# Patient Record
Sex: MALE | Race: WHITE | NOT HISPANIC OR LATINO | Employment: FULL TIME | URBAN - METROPOLITAN AREA
[De-identification: names, ages, dates, MRNs, and addresses within clinical notes are randomized per-mention and may not be internally consistent; named-entity substitution may affect disease eponyms.]

---

## 2017-04-05 ENCOUNTER — COMMUNICATION - HEALTHEAST (OUTPATIENT)
Dept: CARDIOLOGY | Facility: CLINIC | Age: 47
End: 2017-04-05

## 2017-04-05 DIAGNOSIS — E78.5 HYPERLIPIDEMIA: ICD-10-CM

## 2017-04-26 ENCOUNTER — OFFICE VISIT - HEALTHEAST (OUTPATIENT)
Dept: CARDIOLOGY | Facility: CLINIC | Age: 47
End: 2017-04-26

## 2017-04-26 DIAGNOSIS — E78.5 HYPERLIPIDEMIA LDL GOAL <130: ICD-10-CM

## 2017-04-26 ASSESSMENT — MIFFLIN-ST. JEOR: SCORE: 1660.93

## 2017-05-16 ENCOUNTER — COMMUNICATION - HEALTHEAST (OUTPATIENT)
Dept: CARDIOLOGY | Facility: CLINIC | Age: 47
End: 2017-05-16

## 2017-05-16 DIAGNOSIS — E78.5 HYPERLIPIDEMIA: ICD-10-CM

## 2018-04-25 ENCOUNTER — COMMUNICATION - HEALTHEAST (OUTPATIENT)
Dept: CARDIOLOGY | Facility: CLINIC | Age: 48
End: 2018-04-25

## 2018-04-25 DIAGNOSIS — E78.5 HYPERLIPIDEMIA: ICD-10-CM

## 2018-05-31 ENCOUNTER — COMMUNICATION - HEALTHEAST (OUTPATIENT)
Dept: CARDIOLOGY | Facility: CLINIC | Age: 48
End: 2018-05-31

## 2018-05-31 DIAGNOSIS — E78.5 HYPERLIPIDEMIA: ICD-10-CM

## 2018-06-03 ENCOUNTER — COMMUNICATION - HEALTHEAST (OUTPATIENT)
Dept: CARDIOLOGY | Facility: CLINIC | Age: 48
End: 2018-06-03

## 2018-06-03 DIAGNOSIS — E78.5 HYPERLIPIDEMIA: ICD-10-CM

## 2018-07-17 ENCOUNTER — COMMUNICATION - HEALTHEAST (OUTPATIENT)
Dept: CARDIOLOGY | Facility: CLINIC | Age: 48
End: 2018-07-17

## 2018-07-17 ENCOUNTER — OFFICE VISIT - HEALTHEAST (OUTPATIENT)
Dept: CARDIOLOGY | Facility: CLINIC | Age: 48
End: 2018-07-17

## 2018-07-17 ENCOUNTER — COMMUNICATION - HEALTHEAST (OUTPATIENT)
Dept: TELEHEALTH | Facility: CLINIC | Age: 48
End: 2018-07-17

## 2018-07-17 DIAGNOSIS — E78.00 HYPERCHOLESTEROLEMIA: ICD-10-CM

## 2018-07-17 DIAGNOSIS — Z82.49 FAMILY HISTORY OF PREMATURE CAD: ICD-10-CM

## 2018-07-17 LAB
CHOLEST SERPL-MCNC: 165 MG/DL
FASTING STATUS PATIENT QL REPORTED: YES
HDLC SERPL-MCNC: 44 MG/DL
LDLC SERPL CALC-MCNC: 109 MG/DL
TRIGL SERPL-MCNC: 59 MG/DL

## 2018-07-17 ASSESSMENT — MIFFLIN-ST. JEOR: SCORE: 1583.82

## 2018-08-08 ENCOUNTER — COMMUNICATION - HEALTHEAST (OUTPATIENT)
Dept: CARDIOLOGY | Facility: CLINIC | Age: 48
End: 2018-08-08

## 2018-08-08 DIAGNOSIS — E78.5 HYPERLIPIDEMIA: ICD-10-CM

## 2019-07-28 ENCOUNTER — COMMUNICATION - HEALTHEAST (OUTPATIENT)
Dept: CARDIOLOGY | Facility: CLINIC | Age: 49
End: 2019-07-28

## 2019-07-28 DIAGNOSIS — E78.5 HYPERLIPIDEMIA: ICD-10-CM

## 2019-07-30 ENCOUNTER — COMMUNICATION - HEALTHEAST (OUTPATIENT)
Dept: CARDIOLOGY | Facility: CLINIC | Age: 49
End: 2019-07-30

## 2019-07-30 DIAGNOSIS — E78.00 HYPERCHOLESTEROLEMIA: ICD-10-CM

## 2019-07-30 DIAGNOSIS — Z82.49 FAMILY HISTORY OF PREMATURE CAD: ICD-10-CM

## 2019-08-21 ENCOUNTER — AMBULATORY - HEALTHEAST (OUTPATIENT)
Dept: CARDIOLOGY | Facility: CLINIC | Age: 49
End: 2019-08-21

## 2019-08-21 DIAGNOSIS — Z82.49 FAMILY HISTORY OF PREMATURE CAD: ICD-10-CM

## 2019-08-21 DIAGNOSIS — E78.00 HYPERCHOLESTEROLEMIA: ICD-10-CM

## 2019-08-21 LAB
CHOLEST SERPL-MCNC: 197 MG/DL
FASTING STATUS PATIENT QL REPORTED: YES
HDLC SERPL-MCNC: 52 MG/DL
LDLC SERPL CALC-MCNC: 125 MG/DL
TRIGL SERPL-MCNC: 100 MG/DL

## 2019-08-28 ENCOUNTER — OFFICE VISIT - HEALTHEAST (OUTPATIENT)
Dept: CARDIOLOGY | Facility: CLINIC | Age: 49
End: 2019-08-28

## 2019-08-28 DIAGNOSIS — E78.00 HYPERCHOLESTEROLEMIA: ICD-10-CM

## 2019-08-28 DIAGNOSIS — Z82.49 FAMILY HISTORY OF PREMATURE CORONARY ARTERY DISEASE: ICD-10-CM

## 2019-08-28 ASSESSMENT — MIFFLIN-ST. JEOR: SCORE: 1615.57

## 2019-11-15 ENCOUNTER — COMMUNICATION - HEALTHEAST (OUTPATIENT)
Dept: CARDIOLOGY | Facility: CLINIC | Age: 49
End: 2019-11-15

## 2019-11-15 DIAGNOSIS — E78.5 HYPERLIPIDEMIA: ICD-10-CM

## 2020-06-09 ENCOUNTER — COMMUNICATION - HEALTHEAST (OUTPATIENT)
Dept: CARDIOLOGY | Facility: CLINIC | Age: 50
End: 2020-06-09

## 2020-06-09 DIAGNOSIS — E78.5 HYPERLIPIDEMIA: ICD-10-CM

## 2020-09-17 ENCOUNTER — OFFICE VISIT - HEALTHEAST (OUTPATIENT)
Dept: CARDIOLOGY | Facility: CLINIC | Age: 50
End: 2020-09-17

## 2020-09-17 DIAGNOSIS — Z82.49 FAMILY HISTORY OF PREMATURE CORONARY ARTERY DISEASE: ICD-10-CM

## 2020-09-17 DIAGNOSIS — E78.00 HYPERCHOLESTEROLEMIA: ICD-10-CM

## 2020-09-20 ENCOUNTER — COMMUNICATION - HEALTHEAST (OUTPATIENT)
Dept: CARDIOLOGY | Facility: CLINIC | Age: 50
End: 2020-09-20

## 2020-09-20 DIAGNOSIS — E78.5 HYPERLIPIDEMIA: ICD-10-CM

## 2020-12-21 ENCOUNTER — COMMUNICATION - HEALTHEAST (OUTPATIENT)
Dept: CARDIOLOGY | Facility: CLINIC | Age: 50
End: 2020-12-21

## 2020-12-21 DIAGNOSIS — E78.5 HYPERLIPIDEMIA: ICD-10-CM

## 2021-03-17 ENCOUNTER — COMMUNICATION - HEALTHEAST (OUTPATIENT)
Dept: CARDIOLOGY | Facility: CLINIC | Age: 51
End: 2021-03-17

## 2021-03-17 DIAGNOSIS — E78.5 HYPERLIPIDEMIA: ICD-10-CM

## 2021-04-23 ENCOUNTER — OFFICE VISIT - HEALTHEAST (OUTPATIENT)
Dept: CARDIOLOGY | Facility: CLINIC | Age: 51
End: 2021-04-23

## 2021-04-23 DIAGNOSIS — E78.5 HYPERLIPIDEMIA: ICD-10-CM

## 2021-04-23 DIAGNOSIS — E78.00 HYPERCHOLESTEROLEMIA: ICD-10-CM

## 2021-04-23 DIAGNOSIS — Z82.49 FAMILY HISTORY OF PREMATURE CORONARY ARTERY DISEASE: ICD-10-CM

## 2021-05-30 VITALS — WEIGHT: 189 LBS | BODY MASS INDEX: 29.66 KG/M2 | HEIGHT: 67 IN

## 2021-05-30 NOTE — TELEPHONE ENCOUNTER
----- Message from Arelis Schrader sent at 7/30/2019 12:55 PM CDT -----  Regarding: BETZY Witt asks if Dr. Davenport wants him to be fasting for his appt on 8/28/19 or any lab orders prior to the office visit?     Please reply to pool and we'll call him back either way.      Thank you!  ~Arelis [phone]

## 2021-05-30 NOTE — TELEPHONE ENCOUNTER
Please address patient's question regarding whether any labs should be done prior to 8-28-19 f/u appt otherwise patient will be instructed to remain NPO for 0750 appt.  mg

## 2021-05-31 NOTE — TELEPHONE ENCOUNTER
Order placed per protocol - message sent to sched with instructions to lab appt 1wk prior to f/u.  mg

## 2021-05-31 NOTE — TELEPHONE ENCOUNTER
Message rec'd 8-2-19 @ 0715:        We could order a lipid profile to be done prior to his visit.     Sultana Yadav MD

## 2021-05-31 NOTE — PATIENT INSTRUCTIONS - HE
1. Continue current medications for now  2. Set up CT calcium score to see if cholesterol plaque build up that would drive us to push for lower lipid levels

## 2021-06-01 VITALS — BODY MASS INDEX: 27 KG/M2 | HEIGHT: 67 IN | WEIGHT: 172 LBS

## 2021-06-03 VITALS — HEIGHT: 67 IN | BODY MASS INDEX: 28.09 KG/M2 | WEIGHT: 179 LBS

## 2021-06-11 NOTE — PROGRESS NOTES
Review of systems done with patient over the phone. Positive for weight loss after exercising. All other review of systems within normal limits. He was unable to get his blood pressure today.

## 2021-06-25 NOTE — PROGRESS NOTES
Progress Notes by Sultana Davenport MD at 4/26/2017  3:30 PM     Author: Sultana Davenport MD Service: -- Author Type: Physician    Filed: 4/26/2017  4:17 PM Encounter Date: 4/26/2017 Status: Signed    : Sultana Davenport MD (Physician)           Click to link to [facility] HEART CARE NOTE    Thank you for asking the [facility] Heart Care team to see Mr. Hung Newell to follow-up on hyperlipidemia and palpitations.    Assessment/Recommendations   Assessment:    1.  Hyperlipidemia, currently well controlled on atorvastatin with LDL well below 130.  At this point, in the absence of documented coronary artery disease, I would favor continuation of his current dose.  I did recommend a follow-up lipid profile in December along with liver function studies.  2.  Palpitations, infrequent.  He did have some palpitations in December when he was trying to finish remodeling drop in his garage with the upcoming holidays but otherwise has had none since.  No decline in his exercise capacity.  3.  Family history of early coronary artery disease.  Patient denies any symptoms of exertional chest discomfort or decline in exercise capacity.    Plan:  1.  Continue current dose of atorvastatin  2.  Plan lipid profile and liver function studies in December       History of Present Illness    Mr. Hung Newell is a 47 y.o. male with history of mild hyperlipidemia and strong family history of early heart disease who presents today for follow-up yearly visit.  I initially saw him in August 2014 after he had been seen in the ER for symptoms of palpitations.  He did undergo a stress echo at that time which was negative for ischemic cause.  Because of his strong family history of early heart disease, I did recommend initiation of statin therapy in an effort to drive his LDL below 130.  He has been on atorvastatin and has tolerated it well with his most recent lipid profile in December 2016 showing an LDL of 113.   Currently he reports only infrequent episodes of palpitations with the last episode occurring in early December.  He admitted he was under stress as he was trying to finish a remodeling project in his garage just prior to the holidays.  Outside of those few palpitations, he has remained asymptomatic.  He continues to exercise and denies any decline in his exercise capacity.    ECG (personally reviewed): No ECG today    ECHO (personally reviewed): No recent echo      Physical Examination Review of Systems   Vitals:    04/26/17 1522   BP: 120/84   Pulse: 68   Resp: 14     Body mass index is 29.6 kg/(m^2).  Wt Readings from Last 3 Encounters:   04/26/17 189 lb (85.7 kg)   12/07/15 180 lb (81.6 kg)     General Appearance:   Awake, Alert, No acute distress.   HEENT:  No scleral icterus; the mucous membranes were pink and moist.   Neck: No cervical bruits or jugular venous distention    Chest: The spine was straight. The chest was symmetric.   Lungs:   Respirations unlabored; the lungs are clear to auscultation. No wheezing   Cardiovascular:    regular rate and rhythm.  S1, S2 normal.  No murmur, gallop or rub heard.     Abdomen:  No organomegaly, masses, bruits, or tenderness. Bowels sounds are present   Extremities:  no peripheral edema bilaterally.     Skin: No xanthelasma. Warm, Dry.   Musculoskeletal: No tenderness.   Neurologic: Mood and affect are appropriate.    General: WNL  Eyes: WNL  Ears/Nose/Throat: WNL  Lungs: WNL  Heart: WNL  Stomach: WNL  Bladder: WNL  Muscle/Joints: WNL  Skin: WNL  Nervous System: WNL  Mental Health: WNL     Blood: WNL     Medical History  Surgical History Family History Social History   Past Medical History:   Diagnosis Date   ? Environmental allergies    -hyperlipidemia  Past Surgical History:   Procedure Laterality Date   ? PLEURAL SCARIFICATION      Family History   Problem Relation Age of Onset   ? Coronary artery disease Sister    ? Coronary artery disease Brother    ? Coronary  artery disease Sister    ? Coronary artery disease Brother    ? Heart failure Mother    ? Heart failure Father     Social History     Social History   ? Marital status:      Spouse name: N/A   ? Number of children: N/A   ? Years of education: N/A     Occupational History   ? Not on file.     Social History Main Topics   ? Smoking status: Never Smoker   ? Smokeless tobacco: Not on file   ? Alcohol use Yes      Comment: Occasional   ? Drug use: Not on file   ? Sexual activity: Not on file     Other Topics Concern   ? Not on file     Social History Narrative          Medications  Allergies   Current Outpatient Prescriptions   Medication Sig Dispense Refill   ? aspirin 81 MG EC tablet Take 81 mg by mouth daily.     ? atorvastatin (LIPITOR) 40 MG tablet Take 1 tablet (40 mg total) by mouth at bedtime. 30 tablet 0   ? loratadine (CLARITIN) 10 mg tablet Take 10 mg by mouth daily.       No current facility-administered medications for this visit.       No Known Allergies      Lab Results    Chemistry/lipid CBC Cardiac Enzymes/BNP/TSH/INR   Lab Results   Component Value Date    CHOL 186 12/14/2016    HDL 52 12/14/2016    LDLCALC 113 12/14/2016    TRIG 104 12/14/2016    CREATININE 0.91 08/06/2014    BUN 11 08/06/2014    K 4.0 08/06/2014     08/06/2014     08/06/2014    CO2 24 08/06/2014    Lab Results   Component Value Date    WBC 6.7 08/06/2014    HGB 16.0 08/06/2014    HCT 47.0 08/06/2014    MCV 90 08/06/2014     08/06/2014    Lab Results   Component Value Date    TROPONINI <0.01 08/06/2014    TSH 2.72 08/06/2014

## 2021-06-26 NOTE — PROGRESS NOTES
Progress Notes by Sultana Davenport MD at 7/17/2018  8:10 AM     Author: Sultana Davenport MD Service: -- Author Type: Physician    Filed: 7/17/2018  8:38 AM Encounter Date: 7/17/2018 Status: Signed    : Sultana Davenport MD (Physician)           Click to link to [facility] Care     [facility] HEART CARE NOTE    Thank you, Dr. Black ref. provider found, for asking the [facility] Heart Care team to see Mr. Hung Newell to follow up on hypercholesterolemia and family history of premature coronary artery disease.    Assessment/Recommendations   Assessment:    1.  Family history of premature coronary artery disease.  Patient continues to deny any anginal symptoms despite excellent exercise capacity.  At this point, continue to work on risk factor modification.  2.  Hypercholesterolemia, on atorvastatin 40 mg daily.  Our goal was to try to maintain his LDL below 130.  He has not had a lipid profile for 18 months.  As he is fasting today, will draw lipid profile for reassessment.    Plan:  1.  Draw lipids today.  If LDL remains below 130, continue atorvastatin at current dose  2.  Follow-up in 1 year       History of Present Illness    Mr. Hung Newell is a 48 y.o. male with history of hypercholesterolemia and family history of premature coronary artery disease presents today for a follow-up visit.  Over the course of the past year, he and his wife have drastically changed her diet.  They have been consuming a fair amount of smoothies and healthy type foods, as well as doing workouts.  In the process, he has lost 17 pounds and states he feels great.  He denies any chest discomfort or exertional dyspnea.  No decline in exercise capacity.    ECG (personally reviewed): No ECG today     Cardiac Imaging Studies (personally reviewed): no imaging studies     Physical Examination Review of Systems   Vitals:    07/17/18 0811   BP: 120/84   Pulse: 76   Resp: 16     Body mass index is 26.94 kg/(m^2).  Wt Readings from Last 3  Encounters:   07/17/18 172 lb (78 kg)   04/26/17 189 lb (85.7 kg)   12/07/15 180 lb (81.6 kg)     General Appearance:   Awake, Alert, No acute distress.   HEENT:  No scleral icterus; the mucous membranes were pink and moist.   Neck: No cervical bruits or jugular venous distention    Chest: The spine was straight. The chest was symmetric.   Lungs:   Respirations unlabored; the lungs are clear to auscultation. No wheezing   Cardiovascular:    Regular rate and rhythm.  S1, S2 normal.  No murmur, gallop or rub   Abdomen:  No organomegaly, masses, bruits, or tenderness. Bowels sounds are present   Extremities:  No peripheral edema bilaterally   Skin: No xanthelasma. Warm, Dry.   Musculoskeletal: No tenderness.   Neurologic: Mood and affect are appropriate.    General: WNL  Eyes: WNL  Ears/Nose/Throat: WNL  Lungs: WNL  Heart: WNL  Stomach: WNL  Bladder: WNL  Muscle/Joints: WNL  Skin: WNL  Nervous System: WNL  Mental Health: WNL     Blood: WNL     Medical History  Surgical History Family History Social History   Past Medical History:   Diagnosis Date   ? Environmental allergies    ? Hyperlipidemia     Past Surgical History:   Procedure Laterality Date   ? PLEURAL SCARIFICATION      Family History   Problem Relation Age of Onset   ? Coronary artery disease Sister    ? Coronary artery disease Brother    ? Coronary artery disease Sister    ? Coronary artery disease Brother    ? Heart failure Mother    ? Heart failure Father     Social History     Social History   ? Marital status:      Spouse name: N/A   ? Number of children: N/A   ? Years of education: N/A     Occupational History   ? Not on file.     Social History Main Topics   ? Smoking status: Never Smoker   ? Smokeless tobacco: Never Used   ? Alcohol use Yes      Comment: Occasional   ? Drug use: No   ? Sexual activity: Not on file     Other Topics Concern   ? Not on file     Social History Narrative          Medications  Allergies   Current Outpatient  Prescriptions   Medication Sig Dispense Refill   ? atorvastatin (LIPITOR) 40 MG tablet TAKE 1 TABLET (40 MG TOTAL) BY MOUTH AT BEDTIME 30 tablet 1   ? sulfamethoxazole-trimethoprim (SEPTRA DS) 800-160 mg per tablet Take 1 tablet by mouth 2 (two) times a day.     ? aspirin 81 MG EC tablet Take 81 mg by mouth daily.     ? loratadine (CLARITIN) 10 mg tablet Take 10 mg by mouth daily.       No current facility-administered medications for this visit.       No Known Allergies      Lab Results    Chemistry/lipid CBC Cardiac Enzymes/BNP/TSH/INR   Lab Results   Component Value Date    CHOL 186 12/14/2016    HDL 52 12/14/2016    LDLCALC 113 12/14/2016    TRIG 104 12/14/2016    CREATININE 0.91 08/06/2014    BUN 11 08/06/2014    K 4.0 08/06/2014     08/06/2014     08/06/2014    CO2 24 08/06/2014    Lab Results   Component Value Date    WBC 6.7 08/06/2014    HGB 16.0 08/06/2014    HCT 47.0 08/06/2014    MCV 90 08/06/2014     08/06/2014    Lab Results   Component Value Date    TROPONINI <0.01 08/06/2014    TSH 2.72 08/06/2014

## 2021-06-27 NOTE — PROGRESS NOTES
Progress Notes by Sultana Davenport MD at 8/28/2019  7:50 AM     Author: Sultana Davenport MD Service: -- Author Type: Physician    Filed: 8/28/2019  8:29 AM Encounter Date: 8/28/2019 Status: Signed    : Sultana Davenport MD (Physician)           Click to link to [facility] Heart Care     [facility] HEART CARE NOTE    Thank you,  No ref. provider found, for asking the [facility] Heart Care team to see Mr. Hung Newell to follow-up on hypercholesterolemia and family history of coronary artery disease.    Assessment/Recommendations   Assessment:    1.  Hypercholesterolemia, currently borderline controlled.  His total cholesterol and LDL of risen over the past year, although LDL remains below 130.  He admits to less rigid diet as a result of multiple factors as well as decreased exercise due to knee issues.  At this point, recommended CT coronary calcium score as it may be reasonable to drive his LDL lower.  2.  Family history of premature coronary artery disease.  He states he lost his brother suddenly at the age of 58 possibly due to an acute MI although no autopsy was done.  This is new in the past year since his last visit.  Again recommended CT coronary calcium score to get a sense of whether there is cholesterol plaque present.  Calcium is present, I would favor driving LDL below 100 and ideally below 70.      Plan:  1.  Continue current medications for now  2.  Schedule CT coronary calcium score with further recommendations to follow       History of Present Illness    Mr. Hung Newell is a 49 y.o. male with history of hypercholesterolemia and family history of premature coronary artery disease returns today for a follow-up visit.  He tells me he has not been as stringent with diet and exercise as he was at the time of his visit last year.  His son got  recently and as a result, his diet has not been as rigid as it was.  He also has had difficulty doing as much exercise due to knee issues.  He  currently reports no symptoms of exertional chest discomfort or dyspnea.  He does relate that 1 of his brothers  suddenly at the age of 58 possibly due to an acute MI but no autopsy was done.  His brother did have coronary artery disease.    ECG (personally reviewed): No ECG today    Cardiac Imaging Studies (personally reviewed): No new imaging     Physical Examination Review of Systems   Vitals:    19 0751   BP: 120/78   Pulse: 80   Resp: 16     Body mass index is 28.04 kg/m .  Wt Readings from Last 3 Encounters:   19 179 lb (81.2 kg)   18 172 lb (78 kg)   17 189 lb (85.7 kg)     General Appearance:   Awake, Alert, No acute distress.   HEENT:  No scleral icterus; the mucous membranes were pink and moist.   Neck: No cervical bruits or jugular venous distention    Chest: The spine was straight. The chest was symmetric.   Lungs:   Respirations unlabored; the lungs are clear to auscultation. No wheezing   Cardiovascular:    Regular rate and rhythm.  S1, S2 normal.  No murmur or gallop   Abdomen:  No organomegaly, masses, bruits, or tenderness. Bowels sounds are present   Extremities:  No peripheral edema bilaterally.   Skin: No xanthelasma. Warm, Dry.   Musculoskeletal: No tenderness.   Neurologic: Mood and affect are appropriate.    General: WNL  Eyes: WNL  Ears/Nose/Throat: WNL  Lungs: WNL  Heart: WNL  Stomach: WNL  Bladder: WNL  Muscle/Joints: WNL  Skin: WNL  Nervous System: WNL  Mental Health: WNL     Blood: WNL     Medical History  Surgical History Family History Social History   Past Medical History:   Diagnosis Date   ? Environmental allergies    ? Hyperlipidemia     Past Surgical History:   Procedure Laterality Date   ? PLEURAL SCARIFICATION      Family History   Problem Relation Age of Onset   ? Coronary artery disease Sister    ? Coronary artery disease Brother    ? Acute Myocardial Infarction Brother 58   ? Coronary artery disease Sister    ? Coronary artery disease Brother    ?  Heart failure Mother    ? Heart failure Father     Social History     Socioeconomic History   ? Marital status:      Spouse name: Not on file   ? Number of children: Not on file   ? Years of education: Not on file   ? Highest education level: Not on file   Occupational History   ? Not on file   Social Needs   ? Financial resource strain: Not on file   ? Food insecurity:     Worry: Not on file     Inability: Not on file   ? Transportation needs:     Medical: Not on file     Non-medical: Not on file   Tobacco Use   ? Smoking status: Never Smoker   ? Smokeless tobacco: Never Used   Substance and Sexual Activity   ? Alcohol use: Yes     Comment: Occasional   ? Drug use: No   ? Sexual activity: Not on file   Lifestyle   ? Physical activity:     Days per week: Not on file     Minutes per session: Not on file   ? Stress: Not on file   Relationships   ? Social connections:     Talks on phone: Not on file     Gets together: Not on file     Attends [employer] service: Not on file     Active member of club or organization: Not on file     Attends meetings of clubs or organizations: Not on file     Relationship status: Not on file   ? Intimate partner violence:     Fear of current or ex partner: Not on file     Emotionally abused: Not on file     Physically abused: Not on file     Forced sexual activity: Not on file   Other Topics Concern   ? Not on file   Social History Narrative   ? Not on file          Medications  Allergies   Current Outpatient Medications   Medication Sig Dispense Refill   ? atorvastatin (LIPITOR) 40 MG tablet TAKE 1 TABLET (40 MG TOTAL) BY MOUTH AT BEDTIME 90 tablet 0   ? aspirin 81 MG EC tablet Take 81 mg by mouth daily.     ? loratadine (CLARITIN) 10 mg tablet Take 10 mg by mouth daily.       No current facility-administered medications for this visit.       No Known Allergies      Lab Results    Chemistry/lipid CBC Cardiac Enzymes/BNP/TSH/INR   Lab Results   Component Value Date    CHOL 197  08/21/2019    HDL 52 08/21/2019    LDLCALC 125 08/21/2019    TRIG 100 08/21/2019    CREATININE 0.91 08/06/2014    BUN 11 08/06/2014    K 4.0 08/06/2014     08/06/2014     08/06/2014    CO2 24 08/06/2014    Lab Results   Component Value Date    WBC 6.7 08/06/2014    HGB 16.0 08/06/2014    HCT 47.0 08/06/2014    MCV 90 08/06/2014     08/06/2014    Lab Results   Component Value Date    TROPONINI <0.01 08/06/2014    TSH 2.72 08/06/2014

## 2021-06-29 NOTE — PROGRESS NOTES
"Progress Notes by Sultana Davenport MD at 9/17/2020  9:30 AM     Author: Sultana Davenport MD Service: -- Author Type: Physician    Filed: 9/17/2020 11:49 AM Encounter Date: 9/17/2020 Status: Signed    : Sultana Davenport MD (Physician)           The patient has been notified of following:     \"This video visit will be conducted via a call between you and your physician/provider. We have found that certain health care needs can be provided without the need for an in-person physical exam.  This service lets us provide the care you need with a video conversation.  If a prescription is necessary we can send it directly to your pharmacy.  If lab work is needed we can place an order for that and you can then stop by our lab to have the test done at a later time.      Patient has given verbal consent to a Video visit? Yes    HEART CARE VIDEO ENCOUNTER        The patient has chosen to have the visit conducted as a video visit, to reduce risk of exposure given the current status of Coronavirus in our community. This video visit is being conducted via a call between the patient and physician/provider. Health care needs are being provided without a physical exam.     Assessment/Recommendations   Assessment/Plan:  1.  Hypercholesterolemia.  Patient has had no repeat lipid profile done over the course of the last year due to the coronavirus pandemic.  We again talked about CT calcium score to further assess his risk.  He is interested in pursuing and did not follow through last year but would like to set it up again.  We will plan to get lipids and a calcium score done at the same time with further recommendations to follow.  2.  Family history of early coronary artery disease.    I have reviewed the note as documented.  This accurately captures the substance of my conversation with the patient.    Total time of video between patient and provider was 13 minutes   Start Time: 1130   Stop Time:1143    Originating Location (pt. " Location): Home    Distant Location (provider location):  [facility]      Mode of Communication:  Video Conference via [email or web address]       History of Present Illness/Subjective    Hung Newell is a 50 y.o. male who is being evaluated via a billable video visit and has consented to a video visit.    Hung Newell has a history of hypercholesterolemia and family history of premature coronary artery disease who I am meeting via video due to the coronavirus pandemic.  He and his wife have been doing a significant amount of exercise along with healthy eating during the coronavirus pandemic.  He reports no complaints of chest discomfort or palpitations.  Last year, we had discussed pursuing CT coronary calcium score to see if he has evidence of cholesterol plaquing as this would necessitate driving his LDL lower than 125 which was his last measurement.  He tells me he is interested in doing it but did not do it last year and is somewhat concerned about the coronavirus pandemic.  I reassured him that the likelihood of yaya coronavirus by coming in and doing the test is extremely small.  Also suggested that we repeat a lipid profile as it is been more than a year since his last numbers.  He would like to go ahead and do both test and I told him I would place orders for them.      I have reviewed and updated the patient's Past Medical History, Social History, Family History and Medication List.     Physical Examination performed via live video encounter Review of Systems   General Appearance:   no distress, normal body habitus, upright.   ENT/Mouth: membranes moist, no nasal discharge or bleeding gums.  Normal head shape, no evidence of injury or laceration.     EYES:  no scleral icterus, normal conjunctivae   Neck: no evidence of thyromegaly.  Supple   Chest/Lungs:   No audible wheezing equal chest wall expansion. Non labored breathing.  No cough.   Cardiovascular:   No evidence of elevated jugular  venous pressure.  No evidence of pitting edema bilaterally        Extremities: no cyanosis or clubbing noted.    Skin: no xanthelasma, normal skin color. No evidence of facial lacerations.      Neurologic: Normal arm motion bilateral, no tremors.  No evidence of focal defect.       Psychiatric: alert and oriented x3, calm             Review of systems done with patient over the phone. Positive for weight loss after exercising. All other review of systems within normal limits. He was unable to get his blood pressure today.                                      Medical History  Surgical History Family History Social History   Past Medical History:   Diagnosis Date   ? Environmental allergies    ? Hyperlipidemia     Past Surgical History:   Procedure Laterality Date   ? PLEURAL SCARIFICATION      Family History   Problem Relation Age of Onset   ? Coronary artery disease Sister    ? Coronary artery disease Brother    ? Acute Myocardial Infarction Brother 58   ? Coronary artery disease Sister    ? Coronary artery disease Brother    ? Heart failure Mother    ? Heart failure Father       Social History     Socioeconomic History   ? Marital status:      Spouse name: Not on file   ? Number of children: Not on file   ? Years of education: Not on file   ? Highest education level: Not on file   Occupational History   ? Not on file   Social Needs   ? Financial resource strain: Not on file   ? Food insecurity     Worry: Not on file     Inability: Not on file   ? Transportation needs     Medical: Not on file     Non-medical: Not on file   Tobacco Use   ? Smoking status: Never Smoker   ? Smokeless tobacco: Never Used   Substance and Sexual Activity   ? Alcohol use: Yes     Comment: Occasional   ? Drug use: No   ? Sexual activity: Not on file   Lifestyle   ? Physical activity     Days per week: Not on file     Minutes per session: Not on file   ? Stress: Not on file   Relationships   ? Social connections     Talks on phone:  Not on file     Gets together: Not on file     Attends Restorationist service: Not on file     Active member of club or organization: Not on file     Attends meetings of clubs or organizations: Not on file     Relationship status: Not on file   ? Intimate partner violence     Fear of current or ex partner: Not on file     Emotionally abused: Not on file     Physically abused: Not on file     Forced sexual activity: Not on file   Other Topics Concern   ? Not on file   Social History Narrative   ? Not on file          Medications  Allergies   Current Outpatient Medications   Medication Sig Dispense Refill   ? atorvastatin (LIPITOR) 40 MG tablet TAKE 1 TABLET (40 MG TOTAL) BY MOUTH AT BEDTIME 90 tablet 0   ? aspirin 81 MG EC tablet Take 81 mg by mouth daily.     ? loratadine (CLARITIN) 10 mg tablet Take 10 mg by mouth daily.       No current facility-administered medications for this visit.     No Known Allergies      Lab Results    Chemistry/lipid CBC Cardiac Enzymes/BNP/TSH/INR   Lab Results   Component Value Date    CHOL 197 08/21/2019    HDL 52 08/21/2019    LDLCALC 125 08/21/2019    TRIG 100 08/21/2019    CREATININE 0.91 08/06/2014    BUN 11 08/06/2014    K 4.0 08/06/2014     08/06/2014     08/06/2014    CO2 24 08/06/2014    Lab Results   Component Value Date    WBC 6.7 08/06/2014    HGB 16.0 08/06/2014    HCT 47.0 08/06/2014    MCV 90 08/06/2014     08/06/2014    Lab Results   Component Value Date    TROPONINI <0.01 08/06/2014    TSH 2.72 08/06/2014        Sultana Davenport

## 2021-06-30 NOTE — PROGRESS NOTES
"Progress Notes by Sultana Davenport MD at 4/23/2021  2:30 PM     Author: Sultana Davenport MD Service: -- Author Type: Physician    Filed: 4/23/2021  3:18 PM Encounter Date: 4/23/2021 Status: Signed    : Sultana Davenport MD (Physician)           The patient has been notified of following:     \"This video visit will be conducted via a call between you and your physician/provider. We have found that certain health care needs can be provided without the need for an in-person physical exam.  This service lets us provide the care you need with a video conversation.  If a prescription is necessary we can send it directly to your pharmacy.  If lab work is needed we can place an order for that and you can then stop by our lab to have the test done at a later time.      Patient has given verbal consent to a Video visit? Yes    HEART CARE VIDEO ENCOUNTER        The patient has chosen to have the visit conducted as a video visit, to reduce risk of exposure given the current status of Coronavirus in our community. This video visit is being conducted via a call between the patient and physician/provider. Health care needs are being provided without a physical exam.     Assessment/Recommendations   Assessment/Plan:  1.  Hypercholesterolemia, adequately controlled on atorvastatin in the past although he has not had a lipid profile done in nearly 2 years now due to concerns of yaya Covid.  I will refill his prescription for another 3 months with a plan for him to come in and get a lipid profile.  2.  Family history of premature coronary artery disease.  He is interested in pursuing coronary artery calcium scoring.  Order was placed in September.  He just received his first Covid vaccine dose and will get his next in 4 weeks.  We will likely go ahead with CT calcium scoring in early June.    Follow Up Plan: 3 months following lipid profile and CT calcium scoring  I have reviewed the note as documented.  This accurately captures " the substance of my conversation with the patient.    Total time of video between patient and provider was 17 minutes   Start Time: 1438   Stop Time: 1455    Originating Location (pt. Location):  Home    Distant Location (provider location):  [facility]     Mode of Communication:  Video Conference via [email or web address]       History of Present Illness/Subjective    Hung Newell is a 51 y.o. male who is being evaluated via a billable video visit and has consented to a video visit. Hung Newell has a history of hypercholesterolemia and family history of premature coronary artery disease who I have been seeing for the past couple of years.  We initiated atorvastatin 40 mg daily in an effort to drive his LDL below 130 given absence of documented coronary artery disease.  I had brought up the option of CT calcium scoring; however, this has not been done due to the onset of the coronavirus pandemic and his desire to not come into a Medical Center.  He denies any complaints of exertional chest discomfort or dyspnea.  Has been exercising without any symptoms.      I have reviewed and updated the patient's Past Medical History, Social History, Family History and Medication List.     Physical Examination performed via live video encounter Review of Systems   General Appearance:   no distress, normal body habitus, upright.   ENT/Mouth: membranes moist, no nasal discharge or bleeding gums.  Normal head shape, no evidence of injury or laceration.     EYES:  no scleral icterus, normal conjunctivae   Neck: no evidence of thyromegaly.  Supple   Chest/Lungs:   No audible wheezing equal chest wall expansion. Non labored breathing.  No cough.   Cardiovascular:   No evidence of elevated jugular venous pressure.  No evidence of pitting edema bilaterally        Extremities: no cyanosis or clubbing noted.    Skin: no xanthelasma, normal skin color. No evidence of facial lacerations.      Neurologic: Normal arm  motion bilateral, no tremors.  No evidence of focal defect.       Psychiatric: alert and oriented x3, calm            Review of systems are within normal limits                                      Medical History  Surgical History Family History Social History   Past Medical History:   Diagnosis Date   ? Environmental allergies    ? Hyperlipidemia     Past Surgical History:   Procedure Laterality Date   ? PLEURAL SCARIFICATION      Family History   Problem Relation Age of Onset   ? Coronary artery disease Sister    ? Coronary artery disease Brother    ? Acute Myocardial Infarction Brother 58   ? Coronary artery disease Sister    ? Coronary artery disease Brother    ? Heart failure Mother    ? Heart failure Father       Social History     Socioeconomic History   ? Marital status:      Spouse name: Not on file   ? Number of children: Not on file   ? Years of education: Not on file   ? Highest education level: Not on file   Occupational History   ? Not on file   Social Needs   ? Financial resource strain: Not on file   ? Food insecurity     Worry: Not on file     Inability: Not on file   ? Transportation needs     Medical: Not on file     Non-medical: Not on file   Tobacco Use   ? Smoking status: Never Smoker   ? Smokeless tobacco: Never Used   Substance and Sexual Activity   ? Alcohol use: Yes     Comment: Occasional   ? Drug use: No   ? Sexual activity: Not on file   Lifestyle   ? Physical activity     Days per week: Not on file     Minutes per session: Not on file   ? Stress: Not on file   Relationships   ? Social connections     Talks on phone: Not on file     Gets together: Not on file     Attends [employer]yb[employer]ian service: Not on file     Active member of club or organization: Not on file     Attends meetings of clubs or organizations: Not on file     Relationship status: Not on file   ? Intimate partner violence     Fear of current or ex partner: Not on file     Emotionally abused: Not on file     Physically  abused: Not on file     Forced sexual activity: Not on file   Other Topics Concern   ? Not on file   Social History Narrative   ? Not on file          Medications  Allergies   Current Outpatient Medications   Medication Sig Dispense Refill   ? atorvastatin (LIPITOR) 40 MG tablet Take 1 tablet (40 mg total) by mouth at bedtime. 90 tablet 1   ? aspirin 81 MG EC tablet Take 81 mg by mouth daily.     ? loratadine (CLARITIN) 10 mg tablet Take 10 mg by mouth daily.       No current facility-administered medications for this visit.     No Known Allergies      Lab Results    Chemistry/lipid CBC Cardiac Enzymes/BNP/TSH/INR   Lab Results   Component Value Date    CHOL 197 08/21/2019    HDL 52 08/21/2019    LDLCALC 125 08/21/2019    TRIG 100 08/21/2019    CREATININE 0.91 08/06/2014    BUN 11 08/06/2014    K 4.0 08/06/2014     08/06/2014     08/06/2014    CO2 24 08/06/2014    Lab Results   Component Value Date    WBC 6.7 08/06/2014    HGB 16.0 08/06/2014    HCT 47.0 08/06/2014    MCV 90 08/06/2014     08/06/2014    Lab Results   Component Value Date    TROPONINI <0.01 08/06/2014    TSH 2.72 08/06/2014        Sultana Davenport

## 2021-07-11 ENCOUNTER — HEALTH MAINTENANCE LETTER (OUTPATIENT)
Age: 51
End: 2021-07-11

## 2021-09-05 ENCOUNTER — HEALTH MAINTENANCE LETTER (OUTPATIENT)
Age: 51
End: 2021-09-05

## 2021-12-21 DIAGNOSIS — Z82.49 FAMILY HISTORY OF PREMATURE CORONARY ARTERY DISEASE: Primary | ICD-10-CM

## 2022-02-03 ENCOUNTER — VIRTUAL VISIT (OUTPATIENT)
Dept: CARDIOLOGY | Facility: CLINIC | Age: 52
End: 2022-02-03
Payer: COMMERCIAL

## 2022-02-03 DIAGNOSIS — E78.00 HYPERCHOLESTEROLEMIA: Primary | ICD-10-CM

## 2022-02-03 DIAGNOSIS — Z82.49 FAMILY HISTORY OF PREMATURE CORONARY ARTERY DISEASE: ICD-10-CM

## 2022-02-03 PROCEDURE — 99214 OFFICE O/P EST MOD 30 MIN: CPT | Mod: TEL | Performed by: INTERNAL MEDICINE

## 2022-02-03 NOTE — CONFIDENTIAL NOTE
"    The patient has been notified of following:     \"This telephone visit will be conducted via a call between you and your physician/provider. We have found that certain health care needs can be provided without the need for a physical exam.  This service lets us provide the care you need with a phone conversation.  If a prescription is necessary we can send it directly to your pharmacy.  If lab work is needed we can place an order for that and you can then stop by our lab to have the test done at a later time. If during the course of the call the physician/provider feels a telephone visit is not appropriate, you will not be charged for this service.\" Verbal consent has been obtained for this service by care team member:         HEART CARE PHONE ENCOUNTER      [facility]  [phone]          The patient has chosen to have the visit conducted as a telephone visit, to reduce risk of exposure given the current status of Coronavirus in our community. This telephone visit is being conducted via a call between the patient and physician/provider. Health care needs are being provided without a physical exam.     Assessment/Recommendations   Assessment:    1.  Hypercholesterolemia, currently treated with atorvastatin 40 mg daily.  He has not had a lipid profile in 2 years because of fears of coming into the Medical Center with ongoing Covid pandemic.  I reiterated the importance of getting a lipid level.  We had also discussed CT calcium scoring the last year to see if he has evidence of coronary artery plaquing.  If so, we would want to drive his LDL below 70.  Again stressed the importance of trying to get this information to provide the best preventative care that is possible.  After some discussion, he told me he would be willing to come in in March to get the CT calcium score and lipid level.  In the meantime we will continue with his current dose of atorvastatin.    Plan:  1.  Continue " atorvastatin 40 mg daily  2.  Patient to come in for a lipid profile and CT calcium score next month with further recommendations to follow        I have reviewed the note as documented.  This accurately captures the substance of my conversation with the patient.    Total time of call between patient and provider was 30 minutes   Start Time: 1648   Stop Time: 1718       History of Present Illness/Subjective    Hung Newell is a 51 year old male who is being evaluated via a billable telephone visit.    Eduar has a history of hypercholesterolemia as well as family history of premature coronary artery disease who I have followed over the last 7 years for treatment of his hypercholesterolemia.  He underwent stress testing at that time which was negative for ischemia.  At that point recommended that we shoot for an LDL less than 130.  This was achieved on atorvastatin 40 mg daily; however, over the past 1 to 2 years, we have talked about getting a repeat level as well as pursuing CT coronary calcium score to see if he has evidence of cholesterol plaquing.  Unfortunately, because of the Covid pandemic, he has been resistant to coming into the clinic or hospital because of concerns that he might contracted Covid.  A year ago, he told me he was willing to come in in June 2021 for the study but never followed through.  He currently reports no complaints of exertional chest discomfort or dyspnea.  He has been exercising in a home gym.  Trying to follow a low-fat low-cholesterol diet.    I have reviewed and updated the patient's Past Medical History, Social History, Family History and Medication List.     Physical Examination not performed given phone encounter Review of Systems      Review of Systems - History obtained from the patient  General ROS: negative  Psychological ROS: negative  Ophthalmic ROS: negative  ENT ROS: negative  Allergy and Immunology ROS: negative  Hematological and Lymphatic ROS: negative  Endocrine  ROS: negative  Respiratory ROS: no cough, shortness of breath, or wheezing  Cardiovascular ROS: All negative  Gastrointestinal ROS: no abdominal pain, change in bowel habits, or black or bloody stools  Genito-Urinary ROS: no dysuria, trouble voiding, or hematuria  Musculoskeletal ROS: negative  Neurological ROS: no TIA or stroke symptoms  Dermatological ROS: negative         Medical History  Surgical History Family History Social History   -Hypercholesterolemia Past Surgical History:   Procedure Laterality Date     PLEURAL SCARIFICATION       Family History   Problem Relation Age of Onset     Coronary Artery Disease Sister      Coronary Artery Disease Brother      Acute Myocardial Infarction Brother 58.00     Coronary Artery Disease Sister      Coronary Artery Disease Brother      Heart Failure Mother      Heart Failure Father         Social History     Socioeconomic History     Marital status:      Spouse name: Not on file     Number of children: Not on file     Years of education: Not on file     Highest education level: Not on file   Occupational History     Not on file   Tobacco Use     Smoking status: Never Smoker     Smokeless tobacco: Never Used   Substance and Sexual Activity     Alcohol use: Yes     Comment: Alcoholic Drinks/day: Occasional     Drug use: No     Sexual activity: Not on file   Other Topics Concern     Not on file   Social History Narrative     Not on file     Social Determinants of Health     Financial Resource Strain: Not on file   Food Insecurity: Not on file   Transportation Needs: Not on file   Physical Activity: Not on file   Stress: Not on file   Social Connections: Not on file   Intimate Partner Violence: Not on file   Housing Stability: Not on file           Medications  Allergies   Current Outpatient Medications   Medication Sig Dispense Refill     atorvastatin (LIPITOR) 40 MG tablet TAKE ONE (1) TABLET (40 MG TOTAL) BY MOUTH AT BEDTIME 90 tablet 0     amoxicillin-clavulanate  (AUGMENTIN) 875-125 MG per tablet Take 1 tablet by mouth 2 times daily. (Patient not taking: Reported on 2/3/2022) 20 tablet 0     mupirocin (BACTROBAN) 2 % ointment Apply  topically 3 times daily for 5 days. 22 g 1       Allergies   Allergen Reactions     Pollen Extract           Lab Results    Chemistry/lipid CBC Cardiac Enzymes/BNP/TSH/INR   Recent Labs   Lab Test 08/21/19  0801   CHOL 197   HDL 52      TRIG 100     Recent Labs   Lab Test 08/21/19  0801 07/17/18  0834 12/14/16  0740    109 113     No results for input(s): NA, POTASSIUM, CHLORIDE, CO2, GLC, BUN, CR, GFRESTIMATED, MENA in the last 86976 hours.    Invalid input(s): GRFESTBLACK  No results for input(s): CR in the last 07364 hours.  No results for input(s): A1C in the last 52219 hours.       No results for input(s): WBC, HGB, HCT, MCV, PLT in the last 99145 hours.  No results for input(s): HGB in the last 69629 hours. No results for input(s): TROPONINI in the last 02858 hours.  No results for input(s): BNP, NTBNPI, NTBNP in the last 62040 hours.  No results for input(s): TSH in the last 65102 hours.  No results for input(s): INR in the last 71603 hours.     Sultana Davenport MD

## 2022-02-03 NOTE — LETTER
Date:March 1, 2022      Patient was self referred, no letter generated. Do not send.        [facility] Information

## 2022-04-08 ENCOUNTER — HOSPITAL ENCOUNTER (OUTPATIENT)
Dept: CT IMAGING | Facility: CLINIC | Age: 52
Discharge: HOME | End: 2022-04-08
Attending: INTERNAL MEDICINE
Payer: COMMERCIAL

## 2022-04-08 ENCOUNTER — LAB (OUTPATIENT)
Dept: LAB | Facility: CLINIC | Age: 52
End: 2022-04-08
Attending: INTERNAL MEDICINE
Payer: COMMERCIAL

## 2022-04-08 DIAGNOSIS — Z82.49 FAMILY HISTORY OF PREMATURE CORONARY ARTERY DISEASE: ICD-10-CM

## 2022-04-08 DIAGNOSIS — E78.00 HYPERCHOLESTEROLEMIA: ICD-10-CM

## 2022-04-08 LAB
CHOLEST SERPL-MCNC: 159 MG/DL
CV CALCIUM SCORE AGATSTON LM: 23
CV CALCIUM SCORING AGATSON LAD: 0
CV CALCIUM SCORING AGATSTON CX: 9
CV CALCIUM SCORING AGATSTON RCA: 12
CV CALCIUM SCORING AGATSTON TOTAL: 44
FASTING STATUS PATIENT QL REPORTED: YES
HDLC SERPL-MCNC: 57 MG/DL
LDLC SERPL CALC-MCNC: 89 MG/DL
TRIGL SERPL-MCNC: 64 MG/DL

## 2022-04-08 PROCEDURE — 36415 COLL VENOUS BLD VENIPUNCTURE: CPT

## 2022-04-08 PROCEDURE — 80061 LIPID PANEL: CPT

## 2022-04-08 PROCEDURE — 75571 CT HRT W/O DYE W/CA TEST: CPT | Mod: 26 | Performed by: INTERNAL MEDICINE

## 2022-04-08 PROCEDURE — 75571 CT HRT W/O DYE W/CA TEST: CPT

## 2022-04-25 DIAGNOSIS — Z82.49 FAMILY HISTORY OF PREMATURE CORONARY ARTERY DISEASE: ICD-10-CM

## 2022-04-25 DIAGNOSIS — E78.00 HYPERCHOLESTEROLEMIA: Primary | ICD-10-CM

## 2022-08-07 ENCOUNTER — HEALTH MAINTENANCE LETTER (OUTPATIENT)
Age: 52
End: 2022-08-07

## 2022-08-12 NOTE — LETTER
2/3/2022    Physician No Ref-Primary  No address on file    RE: Hung Newell       Dear Colleague,     I had the pleasure of seeing Hung Newell in the [facility].  Review of systems are within normal limits.    Unable to check vitals      Thank you for allowing me to participate in the care of your patient.      Sincerely,     Sultana Davenport MD     [facility]  cc:   No referring provider defined for this encounter.         Detail Level: Generalized Detail Level: Detailed Detail Level: Zone

## 2022-10-23 ENCOUNTER — HEALTH MAINTENANCE LETTER (OUTPATIENT)
Age: 52
End: 2022-10-23

## 2023-08-27 ENCOUNTER — HEALTH MAINTENANCE LETTER (OUTPATIENT)
Age: 53
End: 2023-08-27

## 2023-10-06 ENCOUNTER — OFFICE VISIT (OUTPATIENT)
Dept: CARDIOLOGY | Facility: CLINIC | Age: 53
End: 2023-10-06
Payer: COMMERCIAL

## 2023-10-06 VITALS
DIASTOLIC BLOOD PRESSURE: 76 MMHG | BODY MASS INDEX: 26.94 KG/M2 | SYSTOLIC BLOOD PRESSURE: 128 MMHG | RESPIRATION RATE: 14 BRPM | WEIGHT: 172 LBS | HEART RATE: 76 BPM

## 2023-10-06 DIAGNOSIS — R00.2 PALPITATIONS: ICD-10-CM

## 2023-10-06 DIAGNOSIS — Z82.49 FAMILY HISTORY OF PREMATURE CORONARY ARTERY DISEASE: ICD-10-CM

## 2023-10-06 DIAGNOSIS — R06.83 SNORING: ICD-10-CM

## 2023-10-06 DIAGNOSIS — I25.10 CORONARY ARTERY DISEASE INVOLVING NATIVE CORONARY ARTERY OF NATIVE HEART WITHOUT ANGINA PECTORIS: ICD-10-CM

## 2023-10-06 DIAGNOSIS — E78.00 HYPERCHOLESTEROLEMIA: Primary | ICD-10-CM

## 2023-10-06 LAB — LDLC SERPL DIRECT ASSAY-MCNC: 101 MG/DL

## 2023-10-06 PROCEDURE — 83721 ASSAY OF BLOOD LIPOPROTEIN: CPT | Performed by: INTERNAL MEDICINE

## 2023-10-06 PROCEDURE — 36415 COLL VENOUS BLD VENIPUNCTURE: CPT | Performed by: INTERNAL MEDICINE

## 2023-10-06 PROCEDURE — 99215 OFFICE O/P EST HI 40 MIN: CPT | Performed by: INTERNAL MEDICINE

## 2023-10-06 NOTE — LETTER
10/6/2023    Physician No Ref-Primary  No address on file    RE: Hung Newell       Dear Colleague,     I had the pleasure of seeing Hung Newell in the [facility].      Thank you for asking the [facility] team to see Mr. Hung Newell to follow-up on hypercholesterolemia, family history of premature coronary artery disease and palpitations.    Assessment/Recommendations   Assessment:    1.  Palpitations, etiology unclear.  May represent intermittent sinus tachycardia due to increased stress although cannot exclude paroxysmal atrial fibrillation as he does report a history of loud snoring.  Examination today was unrevealing.  Recommended a 7-day monitor for further assessment.  2.  Hypercholesterolemia with last lipid profile 18 months ago demonstrating an LDL of 89.  We will repeat his lipid profile today.  Given finding of coronary artery calcification a year ago, I would favor switching to rosuvastatin 40 mg daily if his LDL remains above 70.  3.  Coronary artery calcification with calcium score of 44 documented in April 2022.  Again need to be aggressive with risk factor modification given strong family history of premature CAD.  4.  Family history of premature CAD  5.  Loud snoring.  This may suggest undiagnosed CHASE.  Consider referral to sleep medicine    Plan:  1.  Check direct LDL today as the patient is not fasting  2.  7-day event monitor to assess for arrhythmia  3.  Consider referral to sleep medicine       History of Present Illness    Mr. Hung Newell is a 53 year old male with history of hypercholesterolemia and family history of premature coronary artery disease who presents today for a follow-up visit.  I last met with him via telephone in February 2022 where he was doing well with no complaints of exertional chest discomfort or dyspnea or decline in exercise capacity.  Continues to report no symptoms with exercise but tells me that over the  past 6 weeks, he is having episodes of palpitations or fluttering.  States it occurs multiple times throughout the day and can last hours in duration.  Currently does not feel anything at the time of his visit.  No associated shortness of breath, lightheadedness or near syncope.  Does have a history of loud snoring but no report of apneic episodes.  Does feel somewhat fatigued at times although feels refreshed after he works out in the morning.    ECG (personally reviewed): No ECG today    Cardiac Imaging Studies (personally reviewed): No new imaging     Physical Examination Review of Systems   /76 (BP Location: Left arm, Patient Position: Sitting, Cuff Size: Adult Regular)   Pulse 76   Resp 14   Wt 78 kg (172 lb)   BMI 26.94 kg/m    Body mass index is 26.94 kg/m .  Wt Readings from Last 3 Encounters:   10/06/23 78 kg (172 lb)   08/28/19 81.2 kg (179 lb)   07/17/18 78 kg (172 lb)     General Appearance:   Awake, Alert, No acute distress.   HEENT:  No scleral icterus; the mucous membranes were pink and moist.   Neck: No cervical bruits or jugular venous distention    Chest: The spine was straight. The chest was symmetric.   Lungs:   Respirations unlabored; the lungs are clear to auscultation. No wheezing   Cardiovascular:   Regular rate and rhythm.  S1, S2 normal.  No murmur or gallop   Abdomen:  No organomegaly, masses, bruits, or tenderness. Bowels sounds are present   Extremities: No peripheral edema bilaterally   Skin: No xanthelasma. Warm, Dry.   Musculoskeletal: No tenderness.   Neurologic: Mood and affect are appropriate.    Enc Vitals  BP: 128/76  Pulse: 76  Resp: 14  Weight: 78 kg (172 lb)                                         Medical History  Surgical History Family History Social History   Past Medical History:   Diagnosis Date    Coronary artery disease 04/2022    coronary calcification score 44    Hyperlipidemia     Past Surgical History:   Procedure Laterality Date    PLEURAL SCARIFICATION       Family History   Problem Relation Age of Onset    Coronary Artery Disease Sister     Coronary Artery Disease Brother     Acute Myocardial Infarction Brother 58.00    Coronary Artery Disease Sister     Coronary Artery Disease Brother     Heart Failure Mother     Heart Failure Father     Social History     Socioeconomic History    Marital status:      Spouse name: Not on file    Number of children: Not on file    Years of education: Not on file    Highest education level: Not on file   Occupational History    Not on file   Tobacco Use    Smoking status: Never    Smokeless tobacco: Never   Substance and Sexual Activity    Alcohol use: Yes     Comment: Alcoholic Drinks/day: Occasional    Drug use: No    Sexual activity: Not on file   Other Topics Concern    Not on file   Social History Narrative    Not on file     Social Determinants of Health     Financial Resource Strain: Not on file   Food Insecurity: Not on file   Transportation Needs: Not on file   Physical Activity: Not on file   Stress: Not on file   Social Connections: Not on file   Interpersonal Safety: Not on file   Housing Stability: Not on file          Medications  Allergies   Current Outpatient Medications   Medication Sig Dispense Refill    atorvastatin (LIPITOR) 80 MG tablet Take 1 tablet (80 mg) by mouth daily 90 tablet 3      Allergies   Allergen Reactions    Pollen Extract          Lab Results    Chemistry/lipid CBC Cardiac Enzymes/BNP/TSH/INR   Recent Labs   Lab Test 04/08/22  0759   TRIG 64   LDL 89    No results for input(s): WBC, HGB, HCT, MCV, PLT in the last 70146 hours. No results for input(s): CKTOTAL, CKMB, TROPONINI, BNP, TSH, INR in the last 24140 hours.    Invalid input(s): CKMBINDEX     A total of 42 minutes was spent reviewing patient's medical records, obtaining history and performing examination, as well as discussing diagnoses/ recommendations with patient and answering all questions.                        Thank you for  allowing me to participate in the care of your patient.      Sincerely,     Sultana Davenport MD     [facility]  cc:   No referring provider defined for this encounter.

## 2023-10-06 NOTE — PATIENT INSTRUCTIONS
Check lipids today to see if we need to adjust medications  Set up 7 day monitor to see if you are having an abnormal rhythm  Referral to sleep medicine

## 2023-10-06 NOTE — PROGRESS NOTES
Thank you for asking the [facility] team to see Mr. Hung Newell to follow-up on hypercholesterolemia, family history of premature coronary artery disease and palpitations.    Assessment/Recommendations   Assessment:    1.  Palpitations, etiology unclear.  May represent intermittent sinus tachycardia due to increased stress although cannot exclude paroxysmal atrial fibrillation as he does report a history of loud snoring.  Examination today was unrevealing.  Recommended a 7-day monitor for further assessment.  2.  Hypercholesterolemia with last lipid profile 18 months ago demonstrating an LDL of 89.  We will repeat his lipid profile today.  Given finding of coronary artery calcification a year ago, I would favor switching to rosuvastatin 40 mg daily if his LDL remains above 70.  3.  Coronary artery calcification with calcium score of 44 documented in April 2022.  Again need to be aggressive with risk factor modification given strong family history of premature CAD.  4.  Family history of premature CAD  5.  Loud snoring.  This may suggest undiagnosed CHASE.  Consider referral to sleep medicine    Plan:  1.  Check direct LDL today as the patient is not fasting  2.  7-day event monitor to assess for arrhythmia  3.  Consider referral to sleep medicine       History of Present Illness    Mr. Hung Newell is a 53 year old male with history of hypercholesterolemia and family history of premature coronary artery disease who presents today for a follow-up visit.  I last met with him via telephone in February 2022 where he was doing well with no complaints of exertional chest discomfort or dyspnea or decline in exercise capacity.  Continues to report no symptoms with exercise but tells me that over the past 6 weeks, he is having episodes of palpitations or fluttering.  States it occurs multiple times throughout the day and can last hours in duration.  Currently does not feel anything at the time  of his visit.  No associated shortness of breath, lightheadedness or near syncope.  Does have a history of loud snoring but no report of apneic episodes.  Does feel somewhat fatigued at times although feels refreshed after he works out in the morning.    ECG (personally reviewed): No ECG today    Cardiac Imaging Studies (personally reviewed): No new imaging     Physical Examination Review of Systems   /76 (BP Location: Left arm, Patient Position: Sitting, Cuff Size: Adult Regular)   Pulse 76   Resp 14   Wt 78 kg (172 lb)   BMI 26.94 kg/m    Body mass index is 26.94 kg/m .  Wt Readings from Last 3 Encounters:   10/06/23 78 kg (172 lb)   08/28/19 81.2 kg (179 lb)   07/17/18 78 kg (172 lb)     General Appearance:   Awake, Alert, No acute distress.   HEENT:  No scleral icterus; the mucous membranes were pink and moist.   Neck: No cervical bruits or jugular venous distention    Chest: The spine was straight. The chest was symmetric.   Lungs:   Respirations unlabored; the lungs are clear to auscultation. No wheezing   Cardiovascular:   Regular rate and rhythm.  S1, S2 normal.  No murmur or gallop   Abdomen:  No organomegaly, masses, bruits, or tenderness. Bowels sounds are present   Extremities: No peripheral edema bilaterally   Skin: No xanthelasma. Warm, Dry.   Musculoskeletal: No tenderness.   Neurologic: Mood and affect are appropriate.    Enc Vitals  BP: 128/76  Pulse: 76  Resp: 14  Weight: 78 kg (172 lb)                                         Medical History  Surgical History Family History Social History   Past Medical History:   Diagnosis Date    Coronary artery disease 04/2022    coronary calcification score 44    Hyperlipidemia     Past Surgical History:   Procedure Laterality Date    PLEURAL SCARIFICATION      Family History   Problem Relation Age of Onset    Coronary Artery Disease Sister     Coronary Artery Disease Brother     Acute Myocardial Infarction Brother 58.00    Coronary Artery Disease  Sister     Coronary Artery Disease Brother     Heart Failure Mother     Heart Failure Father     Social History     Socioeconomic History    Marital status:      Spouse name: Not on file    Number of children: Not on file    Years of education: Not on file    Highest education level: Not on file   Occupational History    Not on file   Tobacco Use    Smoking status: Never    Smokeless tobacco: Never   Substance and Sexual Activity    Alcohol use: Yes     Comment: Alcoholic Drinks/day: Occasional    Drug use: No    Sexual activity: Not on file   Other Topics Concern    Not on file   Social History Narrative    Not on file     Social Determinants of Health     Financial Resource Strain: Not on file   Food Insecurity: Not on file   Transportation Needs: Not on file   Physical Activity: Not on file   Stress: Not on file   Social Connections: Not on file   Interpersonal Safety: Not on file   Housing Stability: Not on file          Medications  Allergies   Current Outpatient Medications   Medication Sig Dispense Refill    atorvastatin (LIPITOR) 80 MG tablet Take 1 tablet (80 mg) by mouth daily 90 tablet 3      Allergies   Allergen Reactions    Pollen Extract          Lab Results    Chemistry/lipid CBC Cardiac Enzymes/BNP/TSH/INR   Recent Labs   Lab Test 04/08/22  0759   TRIG 64   LDL 89    No results for input(s): WBC, HGB, HCT, MCV, PLT in the last 96594 hours. No results for input(s): CKTOTAL, CKMB, TROPONINI, BNP, TSH, INR in the last 84427 hours.    Invalid input(s): CKMBINDEX     A total of 42 minutes was spent reviewing patient's medical records, obtaining history and performing examination, as well as discussing diagnoses/ recommendations with patient and answering all questions.

## 2023-10-09 DIAGNOSIS — E78.00 HYPERCHOLESTEROLEMIA: ICD-10-CM

## 2023-10-09 DIAGNOSIS — E78.00 HYPERCHOLESTEROLEMIA: Primary | ICD-10-CM

## 2023-10-09 DIAGNOSIS — Z82.49 FAMILY HISTORY OF PREMATURE CORONARY ARTERY DISEASE: ICD-10-CM

## 2023-10-09 DIAGNOSIS — I25.10 CORONARY ARTERY DISEASE INVOLVING NATIVE CORONARY ARTERY OF NATIVE HEART WITHOUT ANGINA PECTORIS: Primary | ICD-10-CM

## 2023-10-09 DIAGNOSIS — I25.10 CORONARY ARTERY DISEASE INVOLVING NATIVE CORONARY ARTERY OF NATIVE HEART WITHOUT ANGINA PECTORIS: ICD-10-CM

## 2023-10-09 RX ORDER — ROSUVASTATIN CALCIUM 40 MG/1
40 TABLET, COATED ORAL DAILY
Qty: 90 TABLET | Refills: 3 | Status: SHIPPED | OUTPATIENT
Start: 2023-10-09

## 2023-10-09 NOTE — PROGRESS NOTES
Sultana Davenport MD  10/9/2023  8:06 AM CDT Back to Top      Placed message in [facility] to switch from atorvastatin to rosuvastatin 40 mg daily.  Need a lipid profile in 2 to 3 months.           ==Fasting lipid panel ordered in 2-3 months. Dr. Davenport already placed orders for Rosuvastatin. -[facility]

## 2023-10-11 ENCOUNTER — HOSPITAL ENCOUNTER (OUTPATIENT)
Dept: CARDIOLOGY | Facility: CLINIC | Age: 53
Discharge: HOME | End: 2023-10-11
Attending: INTERNAL MEDICINE
Payer: COMMERCIAL

## 2023-10-11 DIAGNOSIS — R00.2 PALPITATIONS: ICD-10-CM

## 2023-10-11 PROCEDURE — 999N000096 CARDIAC MOBILE TELEMETRY MONITOR

## 2023-10-20 PROCEDURE — 93228 REMOTE 30 DAY ECG REV/REPORT: CPT | Performed by: INTERNAL MEDICINE

## 2023-10-23 DIAGNOSIS — I25.10 CORONARY ARTERY DISEASE INVOLVING NATIVE CORONARY ARTERY OF NATIVE HEART WITHOUT ANGINA PECTORIS: Primary | ICD-10-CM

## 2023-10-23 DIAGNOSIS — R00.2 PALPITATIONS: ICD-10-CM

## 2023-10-23 DIAGNOSIS — E78.00 HYPERCHOLESTEROLEMIA: ICD-10-CM

## 2023-11-30 ENCOUNTER — TELEPHONE (OUTPATIENT)
Dept: CARDIOLOGY | Facility: CLINIC | Age: 53
End: 2023-11-30
Payer: COMMERCIAL

## 2023-11-30 NOTE — TELEPHONE ENCOUNTER
Msg rec'd 11-29-23 @ 1544:  Sultana Davenport MD Gorshe, Maureen, RN  Can you reach out to this patient as well regarding sleep referral.  KML    Phone call to patient who reported that he cx'd 11-3-23 sleep consult and does not plan to reschedule - patient stated that he was under a lot of stress and is now sleeping better - explained to patient that reason for sleep referral wad due to reports of loud snoring which could be related to CHASE - patient verbalized understanding and stated he still does not want to proceed with sleep consult - reassured patient that update would be forwarded to Dr. Davenport.  mg

## 2023-11-30 NOTE — TELEPHONE ENCOUNTER
Please note patient's response to sleep referral - yrly follow-up pending 10/2024 - any new orders?   mg

## 2023-12-28 NOTE — TELEPHONE ENCOUNTER
Rec'd no return response / recommendations from Dr. Davenport - no further action required by writer.  mg

## 2024-10-20 ENCOUNTER — HEALTH MAINTENANCE LETTER (OUTPATIENT)
Age: 54
End: 2024-10-20

## 2024-12-28 DIAGNOSIS — E78.00 HYPERCHOLESTEROLEMIA: ICD-10-CM

## 2024-12-28 DIAGNOSIS — Z82.49 FAMILY HISTORY OF PREMATURE CORONARY ARTERY DISEASE: ICD-10-CM

## 2024-12-28 DIAGNOSIS — I25.10 CORONARY ARTERY DISEASE INVOLVING NATIVE CORONARY ARTERY OF NATIVE HEART WITHOUT ANGINA PECTORIS: ICD-10-CM

## 2024-12-30 DIAGNOSIS — R00.2 PALPITATIONS: ICD-10-CM

## 2024-12-30 DIAGNOSIS — E78.00 HYPERCHOLESTEROLEMIA: Primary | ICD-10-CM

## 2024-12-30 DIAGNOSIS — I25.10 CORONARY ARTERY DISEASE INVOLVING NATIVE CORONARY ARTERY OF NATIVE HEART WITHOUT ANGINA PECTORIS: ICD-10-CM

## 2024-12-30 RX ORDER — ROSUVASTATIN CALCIUM 40 MG/1
TABLET, COATED ORAL
Qty: 90 TABLET | Refills: 0 | Status: SHIPPED | OUTPATIENT
Start: 2024-12-30

## 2024-12-30 NOTE — TELEPHONE ENCOUNTER
Last seen 10/6/23 by KML. Noted per 10/6/23 FLP results, pt switched from Atorvastatin to Rosuvastatin and due to recheck FLP again 12/2023. Noted per scheduling, FLP order was sent to [facility] to complete.     Message sent HIM to see if can find FLP. New order placed for follow-up with KML and message sent to scheduling to arrange. If FLP not completed and pt does not schedule follow-up, will refuse refill. LMS

## 2024-12-30 NOTE — TELEPHONE ENCOUNTER
Noted pt scheduled follow-up with KML 2/27/25. Per Elizabeth, no records found about FLP that was due around 12/2023. Refill granted for 90 days. Instructed pt to request further refills at upcoming appt. POONAMS

## 2025-02-27 ENCOUNTER — OFFICE VISIT (OUTPATIENT)
Dept: CARDIOLOGY | Facility: CLINIC | Age: 55
End: 2025-02-27
Payer: COMMERCIAL

## 2025-02-27 VITALS
HEIGHT: 67 IN | RESPIRATION RATE: 16 BRPM | SYSTOLIC BLOOD PRESSURE: 110 MMHG | BODY MASS INDEX: 28.5 KG/M2 | HEART RATE: 60 BPM | WEIGHT: 181.6 LBS | DIASTOLIC BLOOD PRESSURE: 64 MMHG

## 2025-02-27 DIAGNOSIS — R00.2 PALPITATIONS: ICD-10-CM

## 2025-02-27 DIAGNOSIS — E78.00 HYPERCHOLESTEROLEMIA: ICD-10-CM

## 2025-02-27 DIAGNOSIS — I25.10 CORONARY ARTERY DISEASE INVOLVING NATIVE CORONARY ARTERY OF NATIVE HEART WITHOUT ANGINA PECTORIS: Primary | ICD-10-CM

## 2025-02-27 LAB — LDLC SERPL DIRECT ASSAY-MCNC: 71 MG/DL

## 2025-02-27 RX ORDER — MUPIROCIN 20 MG/G
OINTMENT TOPICAL PRN
COMMUNITY
Start: 2025-02-23

## 2025-02-27 RX ORDER — CEPHALEXIN 500 MG/1
500 CAPSULE ORAL
COMMUNITY
Start: 2025-02-23

## 2025-02-27 NOTE — PATIENT INSTRUCTIONS
Check LDL today  Likely continue current medications although may need to add Zetia if your LDL is still above 70.

## 2025-02-27 NOTE — PROGRESS NOTES
"    Thank you for asking the [facility] team to see Mr. Hung Newell to follow-up on hypercholesterolemia, coronary artery disease.    Assessment/Recommendations   Assessment:    1.  Hypercholesterolemia, currently on rosuvastatin 40 mg daily.  He has not had repeat lipids done in 2 years.  Unfortunately he is not fasting today but recommended we draw direct LDL.  If his LDL is above 70, would consider adding Zetia 10 mg daily.  2.  Coronary artery disease with documented coronary artery calcium score of 44 in April 2022.  Continues to deny any symptoms of exertional chest discomfort or dyspnea.  Did recommend that he start taking a baby aspirin daily as this does indicate the presence of some degree of plaque.      Plan:  1.  Continue current medications  2.  Begin aspirin 81 mg daily  3.  Check LDL today with further recommendation to follow       History of Present Illness    Mr. Hung Newell is a 54 year old male with history of elevated coronary artery calcium score of 44 in April 2022, hypercholesterolemia who presents to the office today for a follow-up visit.  Since I saw him 2 years ago, he states he has been feeling well.  Denies any symptoms of exertional chest discomfort or dyspnea.  Denies orthopnea, PND, lower extremity edema.  Has been fairly compliant with a low cholesterol low-fat diet.  Remains physically active.    ECG (personally reviewed): No ECG today    Cardiac Imaging Studies (personally reviewed): No new imaging     Physical Examination Review of Systems   /64 (BP Location: Right arm, Patient Position: Sitting, Cuff Size: Adult Large)   Pulse 60   Resp 16   Ht 1.702 m (5' 7\")   Wt 82.4 kg (181 lb 9.6 oz)   BMI 28.44 kg/m    Body mass index is 28.44 kg/m .  Wt Readings from Last 3 Encounters:   02/27/25 82.4 kg (181 lb 9.6 oz)   10/06/23 78 kg (172 lb)   08/28/19 81.2 kg (179 lb)     General Appearance:   Awake, Alert, No acute distress.   HEENT:  No " "scleral icterus; the mucous membranes were pink and moist.   Neck: No cervical bruits or jugular venous distention    Chest: The spine was straight. The chest was symmetric.   Lungs:   Respirations unlabored; the lungs are clear to auscultation. No wheezing   Cardiovascular:   Regular rate and rhythm.  S1, S2 normal.  No murmur or gallop   Abdomen:  No organomegaly, masses, bruits, or tenderness. Bowels sounds are present   Extremities: No peripheral edema bilaterally   Skin: No xanthelasma. Warm, Dry.   Musculoskeletal: No tenderness.   Neurologic: Mood and affect are appropriate.    Encounter Vitals  BP: 110/64  Pulse: 60  Resp: 16  Weight: 82.4 kg (181 lb 9.6 oz)  Height: 170.2 cm (5' 7\")                                         Medical History  Surgical History Family History Social History   Past Medical History:   Diagnosis Date    Coronary artery disease 04/2022    coronary calcification score 44    Hyperlipidemia     Past Surgical History:   Procedure Laterality Date    PLEURAL SCARIFICATION      Family History   Problem Relation Age of Onset    Coronary Artery Disease Sister     Coronary Artery Disease Brother     Acute Myocardial Infarction Brother 58.00    Coronary Artery Disease Sister     Coronary Artery Disease Brother     Heart Failure Mother     Heart Failure Father     Social History     Socioeconomic History    Marital status:      Spouse name: Not on file    Number of children: Not on file    Years of education: Not on file    Highest education level: Not on file   Occupational History    Not on file   Tobacco Use    Smoking status: Never    Smokeless tobacco: Never   Substance and Sexual Activity    Alcohol use: Yes     Comment: Alcoholic Drinks/day: Occasional    Drug use: No    Sexual activity: Not on file   Other Topics Concern    Not on file   Social History Narrative    Not on file     Social Drivers of Health     Financial Resource Strain: High Risk (1/1/2022)    Received from [facility] " "[facility], [facility]    Financial Resource Strain     Difficulty of Paying Living Expenses: Not on file     Difficulty of Paying Living Expenses: Not on file   Food Insecurity: Not on file   Transportation Needs: Not on file   Physical Activity: Not on file   Stress: Not on file   Social Connections: Unknown (1/1/2022)    Received from [facility], [facility]    Social Connections     Frequency of Communication with Friends and Family: Not on file   Interpersonal Safety: Not on file   Housing Stability: Not on file          Medications  Allergies   Current Outpatient Medications   Medication Sig Dispense Refill    cephALEXin (KEFLEX) 500 MG capsule Take 500 mg by mouth. Every 6 hrs for 5 days.      mupirocin (BACTROBAN) 2 % external ointment Apply topically as needed.      rosuvastatin (CRESTOR) 40 MG tablet TAKE ONE (1) TABLET (40 MG) BY MOUTH DAILY 90 tablet 0      Allergies   Allergen Reactions    Pollen Extract          Lab Results    Chemistry/lipid CBC Cardiac Enzymes/BNP/TSH/INR   Recent Labs   Lab Test 10/06/23  0923 04/08/22  0759   TRIG  --  64   * 89    No results for input(s): \"WBC\", \"HGB\", \"HCT\", \"MCV\", \"PLT\" in the last 43508 hours. No results for input(s): \"CKTOTAL\", \"CKMB\", \"TROPONINI\", \"BNP\", \"TSH\", \"INR\" in the last 15159 hours.    Invalid input(s): \"CKMBINDEX\"     A total of 35 minutes was spent reviewing patient's medical records, obtaining history and performing examination, as well as discussing diagnoses/ recommendations with patient and answering all questions.                      "

## 2025-02-27 NOTE — LETTER
"2/27/2025    Physician No Ref-Primary  No address on file    RE: Hung Newell       Dear Colleague,     I had the pleasure of seeing Hung Newell in the [facility].      Thank you for asking the [facility] team to see Mr. Hung Newell to follow-up on hypercholesterolemia, coronary artery disease.    Assessment/Recommendations   Assessment:    1.  Hypercholesterolemia, currently on rosuvastatin 40 mg daily.  He has not had repeat lipids done in 2 years.  Unfortunately he is not fasting today but recommended we draw direct LDL.  If his LDL is above 70, would consider adding Zetia 10 mg daily.  2.  Coronary artery disease with documented coronary artery calcium score of 44 in April 2022.  Continues to deny any symptoms of exertional chest discomfort or dyspnea.  Did recommend that he start taking a baby aspirin daily as this does indicate the presence of some degree of plaque.      Plan:  1.  Continue current medications  2.  Begin aspirin 81 mg daily  3.  Check LDL today with further recommendation to follow       History of Present Illness    Mr. Hung Newell is a 54 year old male with history of elevated coronary artery calcium score of 44 in April 2022, hypercholesterolemia who presents to the office today for a follow-up visit.  Since I saw him 2 years ago, he states he has been feeling well.  Denies any symptoms of exertional chest discomfort or dyspnea.  Denies orthopnea, PND, lower extremity edema.  Has been fairly compliant with a low cholesterol low-fat diet.  Remains physically active.    ECG (personally reviewed): No ECG today    Cardiac Imaging Studies (personally reviewed): No new imaging     Physical Examination Review of Systems   /64 (BP Location: Right arm, Patient Position: Sitting, Cuff Size: Adult Large)   Pulse 60   Resp 16   Ht 1.702 m (5' 7\")   Wt 82.4 kg (181 lb 9.6 oz)   BMI 28.44 kg/m    Body mass index is 28.44 kg/m .  Wt " "Readings from Last 3 Encounters:   02/27/25 82.4 kg (181 lb 9.6 oz)   10/06/23 78 kg (172 lb)   08/28/19 81.2 kg (179 lb)     General Appearance:   Awake, Alert, No acute distress.   HEENT:  No scleral icterus; the mucous membranes were pink and moist.   Neck: No cervical bruits or jugular venous distention    Chest: The spine was straight. The chest was symmetric.   Lungs:   Respirations unlabored; the lungs are clear to auscultation. No wheezing   Cardiovascular:   Regular rate and rhythm.  S1, S2 normal.  No murmur or gallop   Abdomen:  No organomegaly, masses, bruits, or tenderness. Bowels sounds are present   Extremities: No peripheral edema bilaterally   Skin: No xanthelasma. Warm, Dry.   Musculoskeletal: No tenderness.   Neurologic: Mood and affect are appropriate.    Encounter Vitals  BP: 110/64  Pulse: 60  Resp: 16  Weight: 82.4 kg (181 lb 9.6 oz)  Height: 170.2 cm (5' 7\")                                         Medical History  Surgical History Family History Social History   Past Medical History:   Diagnosis Date     Coronary artery disease 04/2022    coronary calcification score 44     Hyperlipidemia     Past Surgical History:   Procedure Laterality Date     PLEURAL SCARIFICATION      Family History   Problem Relation Age of Onset     Coronary Artery Disease Sister      Coronary Artery Disease Brother      Acute Myocardial Infarction Brother 58.00     Coronary Artery Disease Sister      Coronary Artery Disease Brother      Heart Failure Mother      Heart Failure Father     Social History     Socioeconomic History     Marital status:      Spouse name: Not on file     Number of children: Not on file     Years of education: Not on file     Highest education level: Not on file   Occupational History     Not on file   Tobacco Use     Smoking status: Never     Smokeless tobacco: Never   Substance and Sexual Activity     Alcohol use: Yes     Comment: Alcoholic Drinks/day: Occasional     Drug use: No     " "Sexual activity: Not on file   Other Topics Concern     Not on file   Social History Narrative     Not on file     Social Drivers of Health     Financial Resource Strain: High Risk (1/1/2022)    [facility]    Financial Resource Strain      Difficulty of Paying Living Expenses: Not on file      Difficulty of Paying Living Expenses: Not on file   Food Insecurity: Not on file   Transportation Needs: Not on file   Physical Activity: Not on file   Stress: Not on file   Social Connections: Unknown (1/1/2022)    [facility]    Social Connections      Frequency of Communication with Friends and Family: Not on file   Interpersonal Safety: Not on file   Housing Stability: Not on file          Medications  Allergies   Current Outpatient Medications   Medication Sig Dispense Refill     cephALEXin (KEFLEX) 500 MG capsule Take 500 mg by mouth. Every 6 hrs for 5 days.       mupirocin (BACTROBAN) 2 % external ointment Apply topically as needed.       rosuvastatin (CRESTOR) 40 MG tablet TAKE ONE (1) TABLET (40 MG) BY MOUTH DAILY 90 tablet 0      Allergies   Allergen Reactions     Pollen Extract          Lab Results    Chemistry/lipid CBC Cardiac Enzymes/BNP/TSH/INR   Recent Labs   Lab Test 10/06/23  0923 04/08/22  0759   TRIG  --  64   * 89    No results for input(s): \"WBC\", \"HGB\", \"HCT\", \"MCV\", \"PLT\" in the last 69153 hours. No results for input(s): \"CKTOTAL\", \"CKMB\", \"TROPONINI\", \"BNP\", \"TSH\", \"INR\" in the last 76186 hours.    Invalid input(s): \"CKMBINDEX\"     A total of 35 minutes was spent reviewing patient's medical records, obtaining history and performing examination, as well as discussing diagnoses/ recommendations with patient and answering all questions.                        Thank you for allowing me to participate in the care of " your patient.      Sincerely,     Sultana Davenport MD     [facility]  cc:   Sultana Davenport MD  [address], [address]  [place] [address]

## 2025-03-03 DIAGNOSIS — E78.00 HYPERCHOLESTEROLEMIA: Primary | ICD-10-CM

## 2025-03-03 DIAGNOSIS — Z82.49 FAMILY HISTORY OF PREMATURE CORONARY ARTERY DISEASE: ICD-10-CM

## 2025-03-03 DIAGNOSIS — I25.10 CORONARY ARTERY DISEASE INVOLVING NATIVE CORONARY ARTERY OF NATIVE HEART WITHOUT ANGINA PECTORIS: ICD-10-CM

## 2025-03-19 DIAGNOSIS — Z82.49 FAMILY HISTORY OF PREMATURE CORONARY ARTERY DISEASE: ICD-10-CM

## 2025-03-19 DIAGNOSIS — I25.10 CORONARY ARTERY DISEASE INVOLVING NATIVE CORONARY ARTERY OF NATIVE HEART WITHOUT ANGINA PECTORIS: ICD-10-CM

## 2025-03-19 DIAGNOSIS — E78.00 HYPERCHOLESTEROLEMIA: ICD-10-CM

## 2025-03-19 RX ORDER — ROSUVASTATIN CALCIUM 40 MG/1
40 TABLET, COATED ORAL
Qty: 90 TABLET | Refills: 2 | Status: SHIPPED | OUTPATIENT
Start: 2025-03-19

## 2025-07-23 ENCOUNTER — OFFICE VISIT (OUTPATIENT)
Dept: CARDIOLOGY | Facility: CLINIC | Age: 55
End: 2025-07-23
Payer: COMMERCIAL

## 2025-07-23 ENCOUNTER — TELEPHONE (OUTPATIENT)
Dept: CARDIOLOGY | Facility: CLINIC | Age: 55
End: 2025-07-23

## 2025-07-23 ENCOUNTER — PREP FOR PROCEDURE (OUTPATIENT)
Dept: CARDIOLOGY | Facility: CLINIC | Age: 55
End: 2025-07-23

## 2025-07-23 VITALS
HEART RATE: 64 BPM | WEIGHT: 180 LBS | BODY MASS INDEX: 28.25 KG/M2 | SYSTOLIC BLOOD PRESSURE: 126 MMHG | HEIGHT: 67 IN | RESPIRATION RATE: 16 BRPM | DIASTOLIC BLOOD PRESSURE: 82 MMHG

## 2025-07-23 DIAGNOSIS — I20.0 UNSTABLE ANGINA (H): Primary | ICD-10-CM

## 2025-07-23 DIAGNOSIS — I25.10 CORONARY ARTERY CALCIFICATION: ICD-10-CM

## 2025-07-23 DIAGNOSIS — E78.00 HYPERCHOLESTEROLEMIA: ICD-10-CM

## 2025-07-23 DIAGNOSIS — Z82.49 FAMILY HISTORY OF PREMATURE CORONARY ARTERY DISEASE: ICD-10-CM

## 2025-07-23 RX ORDER — ASPIRIN 81 MG/1
243 TABLET, CHEWABLE ORAL ONCE
OUTPATIENT
Start: 2025-07-23

## 2025-07-23 RX ORDER — NITROGLYCERIN 0.4 MG/1
TABLET SUBLINGUAL
Qty: 25 TABLET | Refills: 11 | Status: SHIPPED | OUTPATIENT
Start: 2025-07-23

## 2025-07-23 RX ORDER — SODIUM CHLORIDE 9 MG/ML
INJECTION, SOLUTION INTRAVENOUS CONTINUOUS
OUTPATIENT
Start: 2025-07-23

## 2025-07-23 RX ORDER — ASPIRIN 325 MG
325 TABLET ORAL ONCE
OUTPATIENT
Start: 2025-07-23 | End: 2025-07-23

## 2025-07-23 RX ORDER — ASPIRIN 81 MG/1
81 TABLET, CHEWABLE ORAL DAILY
COMMUNITY

## 2025-07-23 RX ORDER — LIDOCAINE 40 MG/G
CREAM TOPICAL
OUTPATIENT
Start: 2025-07-23

## 2025-07-23 RX ORDER — FENTANYL CITRATE 50 UG/ML
25 INJECTION, SOLUTION INTRAMUSCULAR; INTRAVENOUS
Refills: 0 | OUTPATIENT
Start: 2025-07-23

## 2025-07-23 NOTE — LETTER
7/23/2025    Physician No Ref-Primary  No address on file    RE: Hung Newell       Dear Colleague,     I had the pleasure of seeing Hung Newell in the [facility].      Thank you for asking the [facility] team to see Mr. Hung Newell to evaluate new onset exertional chest discomfort, fluttering.    Assessment/Recommendations   Assessment:    1.  New onset exertional chest discomfort associated with palpitations, suspicious for unstable angina.  He does have known coronary artery disease and an elevated calcium score of 44 three years ago, predominantly involving the left main and right coronary artery.  He has had no symptoms until recently when he noted onset of vague tightness in his chest as well as fluttering when he walks.  This is suspicious for unstable angina.  In light of this have recommended urgent coronary angiography.  He is agreeable to the procedure and we will proceed as soon as possible.  2.  Hypercholesterolemia, currently on rosuvastatin 40 mg daily and Zetia 10 mg daily.  He was scheduled to have repeat lipids done next week but we will check a direct LDL today to see if we need to consider PCSK9 inhibitor.      Plan:  1.  Continue current medications  2.  Check direct LDL today  3.  Arrange coronary angiography with percutaneous intervention if clinically indicated as soon as possible       History of Present Illness    Mr. Hung Newell is a 55 year old male with family history of premature CAD, mildly elevated coronary artery calcium score of 44 predominantly in the left main and right coronary artery documented in April 2022 and hypercholesterolemia who presents to the clinic today for evaluation of recent chest discomfort.  Patient states he was well until about 2 months ago when he experienced an episode of chest discomfort when walking down the driveway.  This was associated with a little bit of fluttering in his chest.  It resolved  "after about 5 to 10 minutes.  He did well for about 1-1/2 months.  About 2 weeks ago, he began to note recurrence of the symptoms with activity.  Sometimes it would go away with rest.  Occasionally he would continue with the activity but at a lower pace with resolution of symptoms.  In the last couple days, he has noted symptoms occurring with less activity prompting him to contact our office to be seen today.  Denies any episodes occurring at rest or awakening him from sleep.  No prolonged episodes.  Currently denies any discomfort    ECG (personally reviewed): No ECG today    Cardiac Imaging Studies (personally reviewed): No new imaging     Physical Examination Review of Systems   /82 (BP Location: Left arm, Patient Position: Sitting, Cuff Size: Adult Large)   Pulse 64   Resp 16   Ht 1.702 m (5' 7\")   Wt 81.6 kg (180 lb)   BMI 28.19 kg/m    Body mass index is 28.19 kg/m .  Wt Readings from Last 3 Encounters:   07/23/25 81.6 kg (180 lb)   02/27/25 82.4 kg (181 lb 9.6 oz)   10/06/23 78 kg (172 lb)     General Appearance:   Awake, Alert, No acute distress.   HEENT:  No scleral icterus; the mucous membranes were pink and moist.   Neck: No cervical bruits or jugular venous distention    Chest: The spine was straight. The chest was symmetric.   Lungs:   Respirations unlabored; the lungs are clear to auscultation. No wheezing   Cardiovascular:   Regular rate and rhythm.  S1, S2 normal.  No murmur or gallop   Abdomen:  No organomegaly, masses, bruits, or tenderness. Bowels sounds are present   Extremities: No peripheral edema   Skin: No xanthelasma. Warm, Dry.   Musculoskeletal: No tenderness.   Neurologic: Mood and affect are appropriate.    Encounter Vitals  BP: 126/82  Pulse: 64  Resp: 16  Weight: 81.6 kg (180 lb)  Height: 170.2 cm (5' 7\")  Encounter Vitals  BP: 126/82  Pulse: 64  Resp: 16  Weight: 81.6 kg (180 lb)  Height: 170.2 cm (5' 7\")                                      Medical History  Surgical " History Family History Social History   Past Medical History:   Diagnosis Date     Coronary artery disease 04/2022    coronary calcification score 44     Hyperlipidemia     Past Surgical History:   Procedure Laterality Date     PLEURAL SCARIFICATION      Family History   Problem Relation Age of Onset     Coronary Artery Disease Sister      Coronary Artery Disease Brother      Acute Myocardial Infarction Brother 58.00     Coronary Artery Disease Sister      Coronary Artery Disease Brother      Heart Failure Mother      Heart Failure Father     Social History     Socioeconomic History     Marital status:      Spouse name: Not on file     Number of children: Not on file     Years of education: Not on file     Highest education level: Not on file   Occupational History     Not on file   Tobacco Use     Smoking status: Never     Smokeless tobacco: Never   Substance and Sexual Activity     Alcohol use: Yes     Comment: Alcoholic Drinks/day: Occasional     Drug use: No     Sexual activity: Not on file   Other Topics Concern     Not on file   Social History Narrative     Not on file     Social Drivers of Health     Financial Resource Strain: High Risk (1/1/2022)    Received from [facility]    Financial Resource Strain      Difficulty of Paying Living Expenses: Not on file      Difficulty of Paying Living Expenses: Not on file   Food Insecurity: Not on file   Transportation Needs: Not on file   Physical Activity: Not on file   Stress: Not on file   Social Connections: Unknown (1/1/2022)    Received from [facility]    Social Connections      Frequency of Communication with Friends and Family: Not on file   Interpersonal Safety: Not on file   Housing Stability: Not on file          Medications  Allergies   Current Outpatient Medications   Medication Sig Dispense Refill     aspirin (ASA) 81 MG chewable tablet Take 81 mg by mouth daily.       cephALEXin  "(KEFLEX) 500 MG capsule Take 500 mg by mouth. Every 6 hrs for 5 days.       ezetimibe (ZETIA) 10 MG tablet Take 1 tablet (10 mg) by mouth daily. 90 tablet 3     nitroGLYcerin (NITROSTAT) 0.4 MG sublingual tablet For chest pain place 1 tablet under the tongue every 5 minutes for 3 doses. If symptoms persist 5 minutes after 1st dose call 911. 25 tablet 11     rosuvastatin (CRESTOR) 40 MG tablet TAKE ONE (1) TABLET (40 MG) BY MOUTH DAILY 90 tablet 2     mupirocin (BACTROBAN) 2 % external ointment Apply topically as needed. (Patient not taking: Reported on 7/23/2025)        Allergies   Allergen Reactions     Pollen Extract          Lab Results    Chemistry/lipid CBC Cardiac Enzymes/BNP/TSH/INR   Recent Labs   Lab Test 02/27/25  1416 10/06/23  0923 04/08/22  0759   TRIG  --   --  64   LDL 71   < > 89    < > = values in this interval not displayed.    No results for input(s): \"WBC\", \"HGB\", \"HCT\", \"MCV\", \"PLT\" in the last 82429 hours. No results for input(s): \"CKTOTAL\", \"CKMB\", \"TROPONINI\", \"BNP\", \"TSH\", \"INR\" in the last 18166 hours.    Invalid input(s): \"CKMBINDEX\"     The longitudinal plan of care for the diagnosis(es)/condition(s) as documented were addressed during this visit. Due to the added complexity in care, I will continue to support Eduar in the subsequent management and with ongoing continuity of care.                      Thank you for allowing me to participate in the care of your patient.      Sincerely,     Sultana Davenport MD     [facility]  cc:   Sultana Davenport MD  [address], [address]  [place] [address]      "

## 2025-07-23 NOTE — TELEPHONE ENCOUNTER
Hung Newell  [address] [place] [address]  [phone] (home)     Reason:unstable angina, family history of premature coronary artery disease, hypercholesterolemia  Procedure cardiologist:  Dr. Benites or Dr. Harper  PCP:  No Ref-Primary, Physician  H&P completed by:  Dr. Davenport 7/23/25  Admit date 7/25/25  Arrival time:  0630  Anticoagulation: None  CPAP: No  Previous PCI: None  Bypass Grafts: No  Renal Issues: No  Diabetic?: No  Device?: No    Ambulatory?: Independent     Angiogram Teaching    Reason for Visit:  Patient seen for pre-procedure education in preparation for: Coronary Angiogram, left heart catheterization with Possible Percutaneous Coronary Intervention     Procedure Prep:  Primary Cardiologist note dated: Dr. Davenport 7/23/25  EKG results obtained, dated: Morning of procedure   Pre-procedure labs: Hemogram, BMP and T&S results obtained: Morning of procedure   Lipid Profile results obtained: Morning of procedure     Pre-procedure instructions  In preparation for this procedure, we would ask that you have:  No solid food for 8 hours prior to your procedure  No clear liquids 2 hours prior to your procedure    Patient instructed to shower the evening before or the morning of the procedure.  Leave all valuables at home (jewlery, rings, watches, large amounts of money).  Patient understands (2) visitors allowed during patients stay.   Patient instructed to arrange for transportation home following procedure from a responsible family member of friend. No driving for at least 24 hours post-procedure.  Patient instructed to have a responsible adult with them for 24 hours post-procedure.  Post-procedure follow up process.  Conscious sedation discussed.    Pre-procedure medication instructions  Patient instructed on antiplatelet medication.  Continue medications as scheduled, with a small amount of water on the day of the procedure unless indicated.  Patient instructed to take 325 mg of Aspirin  am of procedure: Yes  Other medication: Morning of procedure please HOLD all vitamins, minerals, and supplements. Please TAKE (4) baby aspirin or (1) full size 325 mg aspirin morning of procedure.      *PATIENTS RECORDS AVAILABLE IN [facility] UNLESS OTHERWISE INDICATED*    There is no problem list on file for this patient.      Current Outpatient Medications   Medication Sig Dispense Refill    aspirin (ASA) 81 MG chewable tablet Take 81 mg by mouth daily.      cephALEXin (KEFLEX) 500 MG capsule Take 500 mg by mouth. Every 6 hrs for 5 days.      ezetimibe (ZETIA) 10 MG tablet Take 1 tablet (10 mg) by mouth daily. 90 tablet 3    mupirocin (BACTROBAN) 2 % external ointment Apply topically as needed. (Patient not taking: Reported on 7/23/2025)      nitroGLYcerin (NITROSTAT) 0.4 MG sublingual tablet For chest pain place 1 tablet under the tongue every 5 minutes for 3 doses. If symptoms persist 5 minutes after 1st dose call 911. 25 tablet 11    rosuvastatin (CRESTOR) 40 MG tablet TAKE ONE (1) TABLET (40 MG) BY MOUTH DAILY 90 tablet 2       Allergies   Allergen Reactions    Pollen Extract        Procedure order set placed: 7/23/25    Plan: Patient education completed. Will be accompanied by his wife. Opportunity to ask questions and have them answered. None further at this time. Patient verbalized understanding of responsible adult for 24 hours and  post-procedure at time of discharge. Patient ready for procedure.       Edmundo Cunningham RN

## 2025-07-23 NOTE — H&P (VIEW-ONLY)
Thank you for asking the [facility] team to see Mr. Hung Newell to evaluate new onset exertional chest discomfort, fluttering.    Assessment/Recommendations   Assessment:    1.  New onset exertional chest discomfort associated with palpitations, suspicious for unstable angina.  He does have known coronary artery disease and an elevated calcium score of 44 three years ago, predominantly involving the left main and right coronary artery.  He has had no symptoms until recently when he noted onset of vague tightness in his chest as well as fluttering when he walks.  This is suspicious for unstable angina.  In light of this have recommended urgent coronary angiography.  He is agreeable to the procedure and we will proceed as soon as possible.  2.  Hypercholesterolemia, currently on rosuvastatin 40 mg daily and Zetia 10 mg daily.  He was scheduled to have repeat lipids done next week but we will check a direct LDL today to see if we need to consider PCSK9 inhibitor.      Plan:  1.  Continue current medications  2.  Check direct LDL today  3.  Arrange coronary angiography with percutaneous intervention if clinically indicated as soon as possible       History of Present Illness    Mr. Hung Newell is a 55 year old male with family history of premature CAD, mildly elevated coronary artery calcium score of 44 predominantly in the left main and right coronary artery documented in April 2022 and hypercholesterolemia who presents to the clinic today for evaluation of recent chest discomfort.  Patient states he was well until about 2 months ago when he experienced an episode of chest discomfort when walking down the driveway.  This was associated with a little bit of fluttering in his chest.  It resolved after about 5 to 10 minutes.  He did well for about 1-1/2 months.  About 2 weeks ago, he began to note recurrence of the symptoms with activity.  Sometimes it would go away with rest.   "Occasionally he would continue with the activity but at a lower pace with resolution of symptoms.  In the last couple days, he has noted symptoms occurring with less activity prompting him to contact our office to be seen today.  Denies any episodes occurring at rest or awakening him from sleep.  No prolonged episodes.  Currently denies any discomfort    ECG (personally reviewed): No ECG today    Cardiac Imaging Studies (personally reviewed): No new imaging     Physical Examination Review of Systems   /82 (BP Location: Left arm, Patient Position: Sitting, Cuff Size: Adult Large)   Pulse 64   Resp 16   Ht 1.702 m (5' 7\")   Wt 81.6 kg (180 lb)   BMI 28.19 kg/m    Body mass index is 28.19 kg/m .  Wt Readings from Last 3 Encounters:   07/23/25 81.6 kg (180 lb)   02/27/25 82.4 kg (181 lb 9.6 oz)   10/06/23 78 kg (172 lb)     General Appearance:   Awake, Alert, No acute distress.   HEENT:  No scleral icterus; the mucous membranes were pink and moist.   Neck: No cervical bruits or jugular venous distention    Chest: The spine was straight. The chest was symmetric.   Lungs:   Respirations unlabored; the lungs are clear to auscultation. No wheezing   Cardiovascular:   Regular rate and rhythm.  S1, S2 normal.  No murmur or gallop   Abdomen:  No organomegaly, masses, bruits, or tenderness. Bowels sounds are present   Extremities: No peripheral edema   Skin: No xanthelasma. Warm, Dry.   Musculoskeletal: No tenderness.   Neurologic: Mood and affect are appropriate.    Encounter Vitals  BP: 126/82  Pulse: 64  Resp: 16  Weight: 81.6 kg (180 lb)  Height: 170.2 cm (5' 7\")  Encounter Vitals  BP: 126/82  Pulse: 64  Resp: 16  Weight: 81.6 kg (180 lb)  Height: 170.2 cm (5' 7\")                                      Medical History  Surgical History Family History Social History   Past Medical History:   Diagnosis Date     Coronary artery disease 04/2022    coronary calcification score 44     Hyperlipidemia     Past Surgical " History:   Procedure Laterality Date     PLEURAL SCARIFICATION      Family History   Problem Relation Age of Onset     Coronary Artery Disease Sister      Coronary Artery Disease Brother      Acute Myocardial Infarction Brother 58.00     Coronary Artery Disease Sister      Coronary Artery Disease Brother      Heart Failure Mother      Heart Failure Father     Social History     Socioeconomic History     Marital status:      Spouse name: Not on file     Number of children: Not on file     Years of education: Not on file     Highest education level: Not on file   Occupational History     Not on file   Tobacco Use     Smoking status: Never     Smokeless tobacco: Never   Substance and Sexual Activity     Alcohol use: Yes     Comment: Alcoholic Drinks/day: Occasional     Drug use: No     Sexual activity: Not on file   Other Topics Concern     Not on file   Social History Narrative     Not on file     Social Drivers of Health     Financial Resource Strain: High Risk (1/1/2022)    Received from [facility]    Financial Resource Strain      Difficulty of Paying Living Expenses: Not on file      Difficulty of Paying Living Expenses: Not on file   Food Insecurity: Not on file   Transportation Needs: Not on file   Physical Activity: Not on file   Stress: Not on file   Social Connections: Unknown (1/1/2022)    Received from [facility]    Social Connections      Frequency of Communication with Friends and Family: Not on file   Interpersonal Safety: Not on file   Housing Stability: Not on file          Medications  Allergies   Current Outpatient Medications   Medication Sig Dispense Refill     aspirin (ASA) 81 MG chewable tablet Take 81 mg by mouth daily.       cephALEXin (KEFLEX) 500 MG capsule Take 500 mg by mouth. Every 6 hrs for 5 days.       ezetimibe (ZETIA) 10 MG tablet Take 1 tablet (10 mg) by mouth daily. 90 tablet 3     nitroGLYcerin (NITROSTAT)  "0.4 MG sublingual tablet For chest pain place 1 tablet under the tongue every 5 minutes for 3 doses. If symptoms persist 5 minutes after 1st dose call 911. 25 tablet 11     rosuvastatin (CRESTOR) 40 MG tablet TAKE ONE (1) TABLET (40 MG) BY MOUTH DAILY 90 tablet 2     mupirocin (BACTROBAN) 2 % external ointment Apply topically as needed. (Patient not taking: Reported on 7/23/2025)        Allergies   Allergen Reactions     Pollen Extract          Lab Results    Chemistry/lipid CBC Cardiac Enzymes/BNP/TSH/INR   Recent Labs   Lab Test 02/27/25  1416 10/06/23  0923 04/08/22  0759   TRIG  --   --  64   LDL 71   < > 89    < > = values in this interval not displayed.    No results for input(s): \"WBC\", \"HGB\", \"HCT\", \"MCV\", \"PLT\" in the last 96329 hours. No results for input(s): \"CKTOTAL\", \"CKMB\", \"TROPONINI\", \"BNP\", \"TSH\", \"INR\" in the last 17545 hours.    Invalid input(s): \"CKMBINDEX\"     The longitudinal plan of care for the diagnosis(es)/condition(s) as documented were addressed during this visit. Due to the added complexity in care, I will continue to support Eduar in the subsequent management and with ongoing continuity of care.                    "

## 2025-07-23 NOTE — PROGRESS NOTES
Thank you for asking the [facility] team to see Mr. Hung Newell to evaluate new onset exertional chest discomfort, fluttering.    Assessment/Recommendations   Assessment:    1.  New onset exertional chest discomfort associated with palpitations, suspicious for unstable angina.  He does have known coronary artery disease and an elevated calcium score of 44 three years ago, predominantly involving the left main and right coronary artery.  He has had no symptoms until recently when he noted onset of vague tightness in his chest as well as fluttering when he walks.  This is suspicious for unstable angina.  In light of this have recommended urgent coronary angiography.  He is agreeable to the procedure and we will proceed as soon as possible.  2.  Hypercholesterolemia, currently on rosuvastatin 40 mg daily and Zetia 10 mg daily.  He was scheduled to have repeat lipids done next week but we will check a direct LDL today to see if we need to consider PCSK9 inhibitor.      Plan:  1.  Continue current medications  2.  Check direct LDL today  3.  Arrange coronary angiography with percutaneous intervention if clinically indicated as soon as possible       History of Present Illness    Mr. Hung Newell is a 55 year old male with family history of premature CAD, mildly elevated coronary artery calcium score of 44 predominantly in the left main and right coronary artery documented in April 2022 and hypercholesterolemia who presents to the clinic today for evaluation of recent chest discomfort.  Patient states he was well until about 2 months ago when he experienced an episode of chest discomfort when walking down the driveway.  This was associated with a little bit of fluttering in his chest.  It resolved after about 5 to 10 minutes.  He did well for about 1-1/2 months.  About 2 weeks ago, he began to note recurrence of the symptoms with activity.  Sometimes it would go away with rest.   "Occasionally he would continue with the activity but at a lower pace with resolution of symptoms.  In the last couple days, he has noted symptoms occurring with less activity prompting him to contact our office to be seen today.  Denies any episodes occurring at rest or awakening him from sleep.  No prolonged episodes.  Currently denies any discomfort    ECG (personally reviewed): No ECG today    Cardiac Imaging Studies (personally reviewed): No new imaging     Physical Examination Review of Systems   /82 (BP Location: Left arm, Patient Position: Sitting, Cuff Size: Adult Large)   Pulse 64   Resp 16   Ht 1.702 m (5' 7\")   Wt 81.6 kg (180 lb)   BMI 28.19 kg/m    Body mass index is 28.19 kg/m .  Wt Readings from Last 3 Encounters:   07/23/25 81.6 kg (180 lb)   02/27/25 82.4 kg (181 lb 9.6 oz)   10/06/23 78 kg (172 lb)     General Appearance:   Awake, Alert, No acute distress.   HEENT:  No scleral icterus; the mucous membranes were pink and moist.   Neck: No cervical bruits or jugular venous distention    Chest: The spine was straight. The chest was symmetric.   Lungs:   Respirations unlabored; the lungs are clear to auscultation. No wheezing   Cardiovascular:   Regular rate and rhythm.  S1, S2 normal.  No murmur or gallop   Abdomen:  No organomegaly, masses, bruits, or tenderness. Bowels sounds are present   Extremities: No peripheral edema   Skin: No xanthelasma. Warm, Dry.   Musculoskeletal: No tenderness.   Neurologic: Mood and affect are appropriate.    Encounter Vitals  BP: 126/82  Pulse: 64  Resp: 16  Weight: 81.6 kg (180 lb)  Height: 170.2 cm (5' 7\")  Encounter Vitals  BP: 126/82  Pulse: 64  Resp: 16  Weight: 81.6 kg (180 lb)  Height: 170.2 cm (5' 7\")                                      Medical History  Surgical History Family History Social History   Past Medical History:   Diagnosis Date    Coronary artery disease 04/2022    coronary calcification score 44    Hyperlipidemia     Past Surgical " History:   Procedure Laterality Date    PLEURAL SCARIFICATION      Family History   Problem Relation Age of Onset    Coronary Artery Disease Sister     Coronary Artery Disease Brother     Acute Myocardial Infarction Brother 58.00    Coronary Artery Disease Sister     Coronary Artery Disease Brother     Heart Failure Mother     Heart Failure Father     Social History     Socioeconomic History    Marital status:      Spouse name: Not on file    Number of children: Not on file    Years of education: Not on file    Highest education level: Not on file   Occupational History    Not on file   Tobacco Use    Smoking status: Never    Smokeless tobacco: Never   Substance and Sexual Activity    Alcohol use: Yes     Comment: Alcoholic Drinks/day: Occasional    Drug use: No    Sexual activity: Not on file   Other Topics Concern    Not on file   Social History Narrative    Not on file     Social Drivers of Health     Financial Resource Strain: High Risk (1/1/2022)    Received from [facility]    Financial Resource Strain     Difficulty of Paying Living Expenses: Not on file     Difficulty of Paying Living Expenses: Not on file   Food Insecurity: Not on file   Transportation Needs: Not on file   Physical Activity: Not on file   Stress: Not on file   Social Connections: Unknown (1/1/2022)    Received from [facility]    Social Connections     Frequency of Communication with Friends and Family: Not on file   Interpersonal Safety: Not on file   Housing Stability: Not on file          Medications  Allergies   Current Outpatient Medications   Medication Sig Dispense Refill    aspirin (ASA) 81 MG chewable tablet Take 81 mg by mouth daily.      cephALEXin (KEFLEX) 500 MG capsule Take 500 mg by mouth. Every 6 hrs for 5 days.      ezetimibe (ZETIA) 10 MG tablet Take 1 tablet (10 mg) by mouth daily. 90 tablet 3    nitroGLYcerin (NITROSTAT) 0.4 MG sublingual tablet For  "chest pain place 1 tablet under the tongue every 5 minutes for 3 doses. If symptoms persist 5 minutes after 1st dose call 911. 25 tablet 11    rosuvastatin (CRESTOR) 40 MG tablet TAKE ONE (1) TABLET (40 MG) BY MOUTH DAILY 90 tablet 2    mupirocin (BACTROBAN) 2 % external ointment Apply topically as needed. (Patient not taking: Reported on 7/23/2025)        Allergies   Allergen Reactions    Pollen Extract          Lab Results    Chemistry/lipid CBC Cardiac Enzymes/BNP/TSH/INR   Recent Labs   Lab Test 02/27/25  1416 10/06/23  0923 04/08/22  0759   TRIG  --   --  64   LDL 71   < > 89    < > = values in this interval not displayed.    No results for input(s): \"WBC\", \"HGB\", \"HCT\", \"MCV\", \"PLT\" in the last 19310 hours. No results for input(s): \"CKTOTAL\", \"CKMB\", \"TROPONINI\", \"BNP\", \"TSH\", \"INR\" in the last 69009 hours.    Invalid input(s): \"CKMBINDEX\"     The longitudinal plan of care for the diagnosis(es)/condition(s) as documented were addressed during this visit. Due to the added complexity in care, I will continue to support Eduar in the subsequent management and with ongoing continuity of care.                    "

## 2025-07-25 ENCOUNTER — APPOINTMENT (OUTPATIENT)
Dept: CARDIOLOGY | Facility: HOSPITAL | Age: 55
DRG: 234 | End: 2025-07-25
Attending: NURSE PRACTITIONER
Payer: COMMERCIAL

## 2025-07-25 ENCOUNTER — APPOINTMENT (OUTPATIENT)
Dept: ULTRASOUND IMAGING | Facility: HOSPITAL | Age: 55
DRG: 234 | End: 2025-07-25
Payer: COMMERCIAL

## 2025-07-25 ENCOUNTER — APPOINTMENT (OUTPATIENT)
Dept: RADIOLOGY | Facility: HOSPITAL | Age: 55
DRG: 234 | End: 2025-07-25
Payer: COMMERCIAL

## 2025-07-25 ENCOUNTER — HOSPITAL ENCOUNTER (INPATIENT)
Facility: HOSPITAL | Age: 55
End: 2025-07-25
Attending: INTERNAL MEDICINE | Admitting: THORACIC SURGERY (CARDIOTHORACIC VASCULAR SURGERY)
Payer: COMMERCIAL

## 2025-07-25 DIAGNOSIS — I25.10 CORONARY ARTERY CALCIFICATION: ICD-10-CM

## 2025-07-25 DIAGNOSIS — E78.00 HYPERCHOLESTEROLEMIA: ICD-10-CM

## 2025-07-25 DIAGNOSIS — Z82.49 FAMILY HISTORY OF PREMATURE CORONARY ARTERY DISEASE: ICD-10-CM

## 2025-07-25 DIAGNOSIS — I25.118 CORONARY ARTERY DISEASE OF NATIVE ARTERY OF NATIVE HEART WITH STABLE ANGINA PECTORIS: Primary | ICD-10-CM

## 2025-07-25 DIAGNOSIS — Z95.1 S/P CABG (CORONARY ARTERY BYPASS GRAFT): Primary | ICD-10-CM

## 2025-07-25 DIAGNOSIS — I20.0 UNSTABLE ANGINA (H): ICD-10-CM

## 2025-07-25 PROBLEM — Z98.890 STATUS POST CORONARY ANGIOGRAM: Status: ACTIVE | Noted: 2025-07-25

## 2025-07-25 LAB
ABO + RH BLD: NORMAL
ANION GAP SERPL CALCULATED.3IONS-SCNC: 11 MMOL/L (ref 7–15)
ATRIAL RATE - MUSE: 83 BPM
BLD GP AB SCN SERPL QL: NEGATIVE
BUN SERPL-MCNC: 10 MG/DL (ref 6–20)
CALCIUM SERPL-MCNC: 9.4 MG/DL (ref 8.8–10.4)
CHLORIDE SERPL-SCNC: 103 MMOL/L (ref 98–107)
CHOLEST SERPL-MCNC: 106 MG/DL
CREAT SERPL-MCNC: 1.02 MG/DL (ref 0.67–1.17)
DIASTOLIC BLOOD PRESSURE - MUSE: NORMAL MMHG
EGFRCR SERPLBLD CKD-EPI 2021: 86 ML/MIN/1.73M2
ERYTHROCYTE [DISTWIDTH] IN BLOOD BY AUTOMATED COUNT: 11.7 % (ref 10–15)
EST. AVERAGE GLUCOSE BLD GHB EST-MCNC: 117 MG/DL
FASTING STATUS PATIENT QL REPORTED: YES
FASTING STATUS PATIENT QL REPORTED: YES
GLUCOSE SERPL-MCNC: 108 MG/DL (ref 70–99)
HBA1C MFR BLD: 5.7 %
HCO3 SERPL-SCNC: 26 MMOL/L (ref 22–29)
HCT VFR BLD AUTO: 45.9 % (ref 40–53)
HDLC SERPL-MCNC: 47 MG/DL
HGB BLD-MCNC: 15.9 G/DL (ref 13.3–17.7)
INTERPRETATION ECG - MUSE: NORMAL
LDLC SERPL CALC-MCNC: 43 MG/DL
LVEF ECHO: NORMAL
MCH RBC QN AUTO: 30.2 PG (ref 26.5–33)
MCHC RBC AUTO-ENTMCNC: 34.6 G/DL (ref 31.5–36.5)
MCV RBC AUTO: 87 FL (ref 78–100)
NONHDLC SERPL-MCNC: 59 MG/DL
P AXIS - MUSE: 60 DEGREES
PLATELET # BLD AUTO: 238 10E3/UL (ref 150–450)
POTASSIUM SERPL-SCNC: 4.3 MMOL/L (ref 3.4–5.3)
PR INTERVAL - MUSE: 154 MS
QRS DURATION - MUSE: 82 MS
QT - MUSE: 358 MS
QTC - MUSE: 420 MS
R AXIS - MUSE: 52 DEGREES
RBC # BLD AUTO: 5.26 10E6/UL (ref 4.4–5.9)
SODIUM SERPL-SCNC: 140 MMOL/L (ref 135–145)
SPECIMEN EXP DATE BLD: NORMAL
SYSTOLIC BLOOD PRESSURE - MUSE: NORMAL MMHG
T AXIS - MUSE: 43 DEGREES
TRIGL SERPL-MCNC: 79 MG/DL
VENTRICULAR RATE- MUSE: 83 BPM
WBC # BLD AUTO: 5.9 10E3/UL (ref 4–11)

## 2025-07-25 PROCEDURE — 93458 L HRT ARTERY/VENTRICLE ANGIO: CPT | Mod: 26 | Performed by: INTERNAL MEDICINE

## 2025-07-25 PROCEDURE — 99223 1ST HOSP IP/OBS HIGH 75: CPT | Mod: 25 | Performed by: INTERNAL MEDICINE

## 2025-07-25 PROCEDURE — 93458 L HRT ARTERY/VENTRICLE ANGIO: CPT | Performed by: INTERNAL MEDICINE

## 2025-07-25 PROCEDURE — 80048 BASIC METABOLIC PNL TOTAL CA: CPT | Performed by: INTERNAL MEDICINE

## 2025-07-25 PROCEDURE — 255N000002 HC RX 255 OP 636: Performed by: NURSE PRACTITIONER

## 2025-07-25 PROCEDURE — 250N000009 HC RX 250: Performed by: INTERNAL MEDICINE

## 2025-07-25 PROCEDURE — 80061 LIPID PANEL: CPT | Performed by: INTERNAL MEDICINE

## 2025-07-25 PROCEDURE — 210N000001 HC R&B IMCU HEART CARE

## 2025-07-25 PROCEDURE — 4A023N7 MEASUREMENT OF CARDIAC SAMPLING AND PRESSURE, LEFT HEART, PERCUTANEOUS APPROACH: ICD-10-PCS | Performed by: INTERNAL MEDICINE

## 2025-07-25 PROCEDURE — 86901 BLOOD TYPING SEROLOGIC RH(D): CPT | Performed by: INTERNAL MEDICINE

## 2025-07-25 PROCEDURE — 255N000002 HC RX 255 OP 636: Performed by: INTERNAL MEDICINE

## 2025-07-25 PROCEDURE — 93005 ELECTROCARDIOGRAM TRACING: CPT | Performed by: INTERNAL MEDICINE

## 2025-07-25 PROCEDURE — 272N000001 HC OR GENERAL SUPPLY STERILE: Performed by: INTERNAL MEDICINE

## 2025-07-25 PROCEDURE — 83036 HEMOGLOBIN GLYCOSYLATED A1C: CPT

## 2025-07-25 PROCEDURE — 86900 BLOOD TYPING SEROLOGIC ABO: CPT | Performed by: INTERNAL MEDICINE

## 2025-07-25 PROCEDURE — 93005 ELECTROCARDIOGRAM TRACING: CPT

## 2025-07-25 PROCEDURE — 36415 COLL VENOUS BLD VENIPUNCTURE: CPT | Performed by: INTERNAL MEDICINE

## 2025-07-25 PROCEDURE — 258N000003 HC RX IP 258 OP 636: Performed by: INTERNAL MEDICINE

## 2025-07-25 PROCEDURE — 999N000208 ECHOCARDIOGRAM COMPLETE

## 2025-07-25 PROCEDURE — 93880 EXTRACRANIAL BILAT STUDY: CPT

## 2025-07-25 PROCEDURE — 93010 ELECTROCARDIOGRAM REPORT: CPT | Performed by: STUDENT IN AN ORGANIZED HEALTH CARE EDUCATION/TRAINING PROGRAM

## 2025-07-25 PROCEDURE — B211YZZ FLUOROSCOPY OF MULTIPLE CORONARY ARTERIES USING OTHER CONTRAST: ICD-10-PCS | Performed by: INTERNAL MEDICINE

## 2025-07-25 PROCEDURE — 85027 COMPLETE CBC AUTOMATED: CPT | Performed by: INTERNAL MEDICINE

## 2025-07-25 PROCEDURE — C1894 INTRO/SHEATH, NON-LASER: HCPCS | Performed by: INTERNAL MEDICINE

## 2025-07-25 PROCEDURE — 250N000011 HC RX IP 250 OP 636: Performed by: INTERNAL MEDICINE

## 2025-07-25 PROCEDURE — 71046 X-RAY EXAM CHEST 2 VIEWS: CPT

## 2025-07-25 PROCEDURE — 85014 HEMATOCRIT: CPT | Performed by: INTERNAL MEDICINE

## 2025-07-25 PROCEDURE — 99222 1ST HOSP IP/OBS MODERATE 55: CPT | Performed by: INTERNAL MEDICINE

## 2025-07-25 PROCEDURE — 93306 TTE W/DOPPLER COMPLETE: CPT | Mod: 26 | Performed by: INTERNAL MEDICINE

## 2025-07-25 PROCEDURE — 99152 MOD SED SAME PHYS/QHP 5/>YRS: CPT | Performed by: INTERNAL MEDICINE

## 2025-07-25 PROCEDURE — 250N000013 HC RX MED GY IP 250 OP 250 PS 637: Performed by: NURSE PRACTITIONER

## 2025-07-25 RX ORDER — LIDOCAINE 40 MG/G
CREAM TOPICAL
Status: DISCONTINUED | OUTPATIENT
Start: 2025-07-25 | End: 2025-07-25 | Stop reason: HOSPADM

## 2025-07-25 RX ORDER — ACETAMINOPHEN 325 MG/1
650 TABLET ORAL EVERY 4 HOURS PRN
Status: DISCONTINUED | OUTPATIENT
Start: 2025-07-25 | End: 2025-07-29

## 2025-07-25 RX ORDER — FENTANYL CITRATE 50 UG/ML
INJECTION, SOLUTION INTRAMUSCULAR; INTRAVENOUS
Status: DISCONTINUED | OUTPATIENT
Start: 2025-07-25 | End: 2025-07-25 | Stop reason: HOSPADM

## 2025-07-25 RX ORDER — SODIUM CHLORIDE 9 MG/ML
75 INJECTION, SOLUTION INTRAVENOUS CONTINUOUS
Status: ACTIVE | OUTPATIENT
Start: 2025-07-25 | End: 2025-07-25

## 2025-07-25 RX ORDER — ATROPINE SULFATE 0.1 MG/ML
0.5 INJECTION INTRAVENOUS
Status: ACTIVE | OUTPATIENT
Start: 2025-07-25 | End: 2025-07-25

## 2025-07-25 RX ORDER — NITROGLYCERIN 0.4 MG/1
0.4 TABLET SUBLINGUAL EVERY 5 MIN PRN
Status: DISCONTINUED | OUTPATIENT
Start: 2025-07-25 | End: 2025-07-29

## 2025-07-25 RX ORDER — DIAZEPAM 10 MG/1
10 TABLET ORAL ONCE
Status: COMPLETED | OUTPATIENT
Start: 2025-07-25 | End: 2025-07-25

## 2025-07-25 RX ORDER — ASPIRIN 81 MG/1
81 TABLET, CHEWABLE ORAL DAILY
Status: DISCONTINUED | OUTPATIENT
Start: 2025-07-26 | End: 2025-07-29

## 2025-07-25 RX ORDER — FENTANYL CITRATE 50 UG/ML
25 INJECTION, SOLUTION INTRAMUSCULAR; INTRAVENOUS
Status: DISCONTINUED | OUTPATIENT
Start: 2025-07-25 | End: 2025-07-29

## 2025-07-25 RX ORDER — EZETIMIBE 10 MG/1
10 TABLET ORAL DAILY
Status: DISCONTINUED | OUTPATIENT
Start: 2025-07-26 | End: 2025-07-29

## 2025-07-25 RX ORDER — HYDRALAZINE HYDROCHLORIDE 20 MG/ML
10 INJECTION INTRAMUSCULAR; INTRAVENOUS
Status: DISCONTINUED | OUTPATIENT
Start: 2025-07-25 | End: 2025-07-29

## 2025-07-25 RX ORDER — OXYCODONE HYDROCHLORIDE 5 MG/1
5 TABLET ORAL EVERY 4 HOURS PRN
Status: DISCONTINUED | OUTPATIENT
Start: 2025-07-25 | End: 2025-07-29

## 2025-07-25 RX ORDER — FENTANYL CITRATE 50 UG/ML
25 INJECTION, SOLUTION INTRAMUSCULAR; INTRAVENOUS
Status: DISCONTINUED | OUTPATIENT
Start: 2025-07-25 | End: 2025-07-25 | Stop reason: HOSPADM

## 2025-07-25 RX ORDER — NALOXONE HYDROCHLORIDE 0.4 MG/ML
0.4 INJECTION, SOLUTION INTRAMUSCULAR; INTRAVENOUS; SUBCUTANEOUS
Status: ACTIVE | OUTPATIENT
Start: 2025-07-25 | End: 2025-07-25

## 2025-07-25 RX ORDER — NALOXONE HYDROCHLORIDE 0.4 MG/ML
0.2 INJECTION, SOLUTION INTRAMUSCULAR; INTRAVENOUS; SUBCUTANEOUS
Status: ACTIVE | OUTPATIENT
Start: 2025-07-25 | End: 2025-07-25

## 2025-07-25 RX ORDER — IODIXANOL 320 MG/ML
INJECTION, SOLUTION INTRAVASCULAR
Status: DISCONTINUED | OUTPATIENT
Start: 2025-07-25 | End: 2025-07-25 | Stop reason: HOSPADM

## 2025-07-25 RX ORDER — FLUMAZENIL 0.1 MG/ML
0.2 INJECTION, SOLUTION INTRAVENOUS
Status: ACTIVE | OUTPATIENT
Start: 2025-07-25 | End: 2025-07-25

## 2025-07-25 RX ORDER — ROSUVASTATIN CALCIUM 40 MG/1
40 TABLET, COATED ORAL AT BEDTIME
Status: DISCONTINUED | OUTPATIENT
Start: 2025-07-26 | End: 2025-07-29

## 2025-07-25 RX ORDER — HEPARIN SODIUM 1000 [USP'U]/ML
INJECTION, SOLUTION INTRAVENOUS; SUBCUTANEOUS
Status: DISCONTINUED | OUTPATIENT
Start: 2025-07-25 | End: 2025-07-25 | Stop reason: HOSPADM

## 2025-07-25 RX ORDER — OXYCODONE HYDROCHLORIDE 5 MG/1
10 TABLET ORAL EVERY 4 HOURS PRN
Status: DISCONTINUED | OUTPATIENT
Start: 2025-07-25 | End: 2025-07-29

## 2025-07-25 RX ORDER — ASPIRIN 325 MG
325 TABLET ORAL ONCE
Status: COMPLETED | OUTPATIENT
Start: 2025-07-25 | End: 2025-07-25

## 2025-07-25 RX ORDER — SODIUM CHLORIDE 9 MG/ML
INJECTION, SOLUTION INTRAVENOUS CONTINUOUS
Status: DISCONTINUED | OUTPATIENT
Start: 2025-07-25 | End: 2025-07-25 | Stop reason: HOSPADM

## 2025-07-25 RX ORDER — ASPIRIN 81 MG/1
243 TABLET, CHEWABLE ORAL ONCE
Status: COMPLETED | OUTPATIENT
Start: 2025-07-25 | End: 2025-07-25

## 2025-07-25 RX ADMIN — PERFLUTREN 2 ML (DILUTED): 6.52 INJECTION, SUSPENSION INTRAVENOUS at 11:10

## 2025-07-25 RX ADMIN — DIAZEPAM 10 MG: 10 TABLET ORAL at 07:30

## 2025-07-25 RX ADMIN — SODIUM CHLORIDE: 0.9 INJECTION, SOLUTION INTRAVENOUS at 07:15

## 2025-07-25 ASSESSMENT — ACTIVITIES OF DAILY LIVING (ADL)
ADLS_ACUITY_SCORE: 41
ADLS_ACUITY_SCORE: 41
ADLS_ACUITY_SCORE: 42
ADLS_ACUITY_SCORE: 41
ADLS_ACUITY_SCORE: 42
ADLS_ACUITY_SCORE: 41
ADLS_ACUITY_SCORE: 42
ADLS_ACUITY_SCORE: 41
ADLS_ACUITY_SCORE: 41
ADLS_ACUITY_SCORE: 42
ADLS_ACUITY_SCORE: 41

## 2025-07-25 ASSESSMENT — EJECTION FRACTION: EF_VALUE: .24

## 2025-07-25 NOTE — CONSULTS
"[facility]  Consult Note - Hospitalist Service  Date of Admission:  7/25/2025  Consult Requested by: Dr. Farah  Reason for Consult: Medical comanagement    Assessment & Plan   Hung Newell is a 55 year old male admitted on 7/25/2025.     #CAD, involving left main.  History of elevated calcium score 44 in 2022.  #Unstable angina, exertional chest pain, s/p coronary angiogram 7/25 showed severe left main disease.  - Cardiothoracic surgery consult  - On aspirin, Crestor, Zetia.    #HLD.  On rosuvastatin 40 mg, Zetia 10 mg.  Check a.m. lipids.    #Prediabetes.  A1c 5.7.  D/W lifestyle modifications.  RD consult.    Clinically Significant Risk Factors Present on Admission        # Drug Induced Platelet Defect: home medication list includes an antiplatelet medication       # Overweight: Estimated body mass index is 28.19 kg/m  as calculated from the following:    Height as of this encounter: 1.702 m (5' 7\").    Weight as of this encounter: 81.6 kg (180 lb).           Azul Abad MD  Hospitalist Service  Securely message with "[employer] (more info)  Text page via [facility] Paging/Directory   ______________________________________________________________________    Chief Complaint   Chest pain    History is obtained from the patient, electronic health record, and emergency department physician    History of Present Illness   Hung Newell is a 55 year old male PMH of HLD, premature CAD, with coronary calcium score of 44, who presented to the clinic on 7/25 for evaluation of exertional chest pain.  Onset of symptoms near 2 weeks ago, when he started experiencing intermittent chest discomfort with physical exertion like walking down the driveway or walking short distances.  Reportedly associated cardiac palpitations.  Chest pain elevated 5-10 minutes of rest.  Frequency episodes increased 2 weeks ago.  Get elevated with rest.  The last few days he had chest discomfort with minimal activities, " thus was evaluated at cardiology office.  He was referred to coronary angiogram, which showed severe left main coronary artery disease.  Patient admitted for coronary revascularization.  He denies exertional dyspnea, leg edema, cough, fever, headache, focal weakness, nausea, vomiting, dysuria.    Past Medical History    Past Medical History:   Diagnosis Date    Coronary artery disease 04/2022    coronary calcification score 44    Hyperlipidemia      Past Surgical History   Past Surgical History:   Procedure Laterality Date    PLEURAL SCARIFICATION       Medications   Medications Prior to Admission   Medication Sig Dispense Refill Last Dose/Taking    aspirin (ASA) 81 MG chewable tablet Take 81 mg by mouth daily.   7/25/2025 Morning    ezetimibe (ZETIA) 10 MG tablet Take 1 tablet (10 mg) by mouth daily. 90 tablet 3 7/25/2025 Morning    nitroGLYcerin (NITROSTAT) 0.4 MG sublingual tablet For chest pain place 1 tablet under the tongue every 5 minutes for 3 doses. If symptoms persist 5 minutes after 1st dose call 911. 25 tablet 11 Unknown    rosuvastatin (CRESTOR) 40 MG tablet TAKE ONE (1) TABLET (40 MG) BY MOUTH DAILY 90 tablet 2 7/25/2025 Morning      Review of Systems    The 10 point Review of Systems is negative other than noted in the HPI.    Social History   I have reviewed this patient's social history and updated it with pertinent information if needed.  Social History     Tobacco Use    Smoking status: Never    Smokeless tobacco: Never   Substance Use Topics    Alcohol use: Yes     Comment: Alcoholic Drinks/day: Occasional    Drug use: No     Family History   I have reviewed this patient's family history and updated it with pertinent information if needed.  Family History   Problem Relation Age of Onset    Coronary Artery Disease Sister     Coronary Artery Disease Brother     Acute Myocardial Infarction Brother 58.00    Coronary Artery Disease Sister     Coronary Artery Disease Brother     Heart Failure Mother      Heart Failure Father      Allergies   Allergies   Allergen Reactions    Pollen Extract      Physical Exam   Vital Signs: Temp: 98  F (36.7  C) Temp src: Oral BP: 120/76 Pulse: 58   Resp: 18 SpO2: 93 % O2 Device: None (Room air) Oxygen Delivery: 2 LPM  Weight: 180 lbs 0 oz    General: Alert and oriented x 3. Not in obvious distress.  HEENT: NC, AT. Neck- supple, No JVP elevation, lymphadenopathy or thyromegaly. Trachea-central.  Chest: Clear to auscultation bilaterally.  Heart: S1S2 regular. No M/R/G.  Abdomen: Soft. NT, ND. No organomegaly. Bowel sounds- active.  Back: No spine tenderness. No CVA tenderness.  Extremities: No leg swelling. Peripheral pulses 2+ bilaterally.  Neuro: Cranial nerves 1-12 grossly normal. No focal neurological deficit    Medical Decision Making       61 MINUTES SPENT BY ME on the date of service doing chart review, history, exam, documentation & further activities per the note.      Data     I have personally reviewed the following data over the past 24 hrs:    N/A  \   14.8   / N/A     140 103 9.3 /  91   4.5 27 0.99 \     Trop: N/A BNP: <36     TSH: N/A T4: N/A A1C: N/A       Imaging results reviewed over the past 24 hrs:   Recent Results (from the past 24 hours)   Cardiac Catheterization    Narrative    CARDIAC CATHETERIZATION AND INTERVENTION REPORT    PATIENT NAME:  Hung Newell          MEDICAL RECORD NUMBER: [id number]  : 1970       PROCEDURE DATE: 2025    CONCLUSIONS:   Severe coronary artery disease involving distal left main bifurcation.    Mild nonobstructive disease elsewhere.  Normal left-sided filling pressures.  No aortic valve stenosis.    RECOMMENDATIONS:   Usual postprocedure cares, please see orders.  Recommend cardiothoracic surgery consultation for evaluation of surgical   revascularization.  Will admit for expedited workup given progressive   symptoms.  Aggressive risk factor modification for secondary prevention.    PROCEDURE PERFORMED:    Selective right and left coronary angiography  Left heart catheterization  PRE-OP DIAGNOSIS:  Progressive angina  POST-OP DIAGNOSIS:  Progressive angina  PROCEDURALISTS: Gagan Benites MD  DIAGNOSTIC PROCEDURAL DETAILS:   Signed, informed consent was obtained. The patient was placed on the table   and prepped and draped in the usual fashion. Time out and site   verification performed.   Access: Right radial artery  Catheters: JL 3.5, JR4  Hemostasis: TR band  PROCEDURAL MEDICATIONS:  Conscious sedation - midazolam and fentanyl, please see procedure log for   dosing.   Local anesthesia - 1% lidocaine   Procedural anticoagulation - 75 units/kg of heparin   CONTRAST VOLUME: 41 mL Visipaque  BLOOD LOSS: minimal   FLUIDS: None   SPECIMENS: None  COMPLICATIONS: None  FINDINGS:  Left Heart Catheterization    LVEDP 10mmHg   No significant gradient across the valve   Coronary Angiography:  LEFT MAIN: Normal caliber vessel that bifurcates into left anterior   descending and left circumflex arteries.  The proximal left main has mild   disease followed by 70% stenosis in the distal portion involving the   bifurcation.  LEFT ANTERIOR DESCENDING: Large vessel that gives rise to medium diagonal   1 branch, a large diagonal 2 branch, multiple septal perforators.  The LAD   wraps around the apex distally.  The the proximal LAD has 70% stenosis   followed by mild disease of the LAD and its branches distally.  LEFT CIRCUMFLEX: Nondominant vessel that gives rise to a large OM branch   and terminates into a small vessel distally.  The ostial proximal OM   branch has 90% stenosis followed by mild disease distally.  RIGHT CORONARY ARTERY: Large dominant vessel that gives rise to the right   posterior descending artery and posterolateral system.  The right coronary   artery and its branches have mild disease.    Please do not hesitate to contact me if you have any questions or   concerns. I was present for the procedure in its  entirety. Moderate   conscious sedation at level 3-4 with fentanyl and midazolam was   administered, and I spent continuous face to face time with the patient   which encompassed the duration of the procedure.  Please refer to the   sedation flow sheet for additional information.  I have reviewed and agree   with the documentation provided in the RN sedation flow sheet as outlined.   I concluded sedation and recovery was monitored by the trained independent   observer. I attest that I have ordered all medications administered,   equipment used, and tests performed during the procedure.    Gagan Benites MD  Interventional Cardiology    Echocardiogram Complete   Result Value    LVEF  60-65%    Narrative    [id number]  [id number]  [id number]  [id number] [address]  Name: ART SAUNDERS  MRN: [id number]  : 1970  Study Date: 2025 10:28 AM  Age: 55 yrs  Gender: Male  Patient Location: [facility]  Reason For Study: CABG  Ordering Physician: ELYSSA PALAFOX  Referring Physician: Sultana Davenport MD  Performed By: CMP^^^^  BSA: 1.9 m2  Height: 67 in  Weight: 180 lb  HR: 79  BP: 127/80 mmHg  ______________________________________________________________________________  Procedure  Echocardiogram with two-dimensional, color and spectral Doppler. Definity (NDC  #[id number]) given intravenously.  ______________________________________________________________________________  Interpretation Summary  The visual ejection fraction is 60-65%.  No regional wall motion abnormalities noted.  The right ventricle is normal in size and function.  No significant valve disease.  ______________________________________________________________________________  Left Ventricle  The left ventricle is normal in size. There is normal left ventricular wall  thickness. The visual ejection fraction is 60-65%. Left ventricular diastolic  function is normal. No regional  wall motion abnormalities noted.  Right Ventricle  The right ventricle is normal in size and function.  Atria  The left atrium is borderline dilated. Right atrial size is normal. Intact  atrial septum.  Mitral Valve  Mitral valve leaflets appear normal. There is no evidence of mitral stenosis  or clinically significant mitral regurgitation.  Tricuspid Valve  Tricuspid valve leaflets appear normal. There is mild (1+) tricuspid  regurgitation. The right ventricular systolic pressure is approximated at  22mmHg plus the right atrial pressure.  Aortic Valve  Aortic valve leaflets appear normal. There is no evidence of aortic stenosis  or clinically significant aortic regurgitation.  Pulmonic Valve  The pulmonic valve is normal in structure and function.  Vessels  The aorta root is normal. Normal size ascending aorta. IVC diameter <2.1 cm  collapsing >50% with sniff suggests a normal RA pressure of 3 mmHg.  Pericardium  There is no pericardial effusion.   _____________________________________________________________________________  MMode/2D Measurements & Calculations  IVSd: 0.93 cm  LVIDd: 4.2 cm  LVIDs: 2.6 cm  LVPWd: 1.0 cm  FS: 38.0 %  LV mass(C)d: 136.1 grams  LV mass(C)dI: 70.4 grams/m2  Ao root diam: 3.1 cm  LA dimension: 2.3 cm  asc Aorta Diam: 3.2 cm  LA/Ao: 0.75  LVOT diam: 2.3 cm  LVOT area: 4.1 cm2  Ao root diam index Ht(cm/m): 1.8  Ao root diam index BSA (cm/m2): 1.6  Asc Ao diam index BSA (cm/m2): 1.6  Asc Ao diam index Ht(cm/m): 1.9  EF Biplane: 64.7 %  LA Volume Indexed (AL/bp): 32.3 ml/m2  RV Base: 3.8 cm  RWT: 0.50  TAPSE: 2.6 cm  Time Measurements  MM HR: 79.0 BPM  Doppler Measurements & Calculations  MV E max priscilla: 73.7 cm/sec  MV A max priscilla: 80.6 cm/sec  MV E/A: 0.91  MV max P.6 mmHg  MV mean P.3 mmHg  MV V2 VTI: 23.7 cm  MVA(VTI): 4.3 cm2  MV dec slope: 300.4 cm/sec2  MV dec time: 0.25 sec  Ao V2 max: 125.6 cm/sec  Ao max P.0 mmHg  Ao V2 mean: 92.4 cm/sec  Ao mean PG: 3.9 mmHg  Ao V2 VTI:  25.7 cm  TASHA(I,D): 4.0 cm2  TASHA(V,D): 4.6 cm2  LV V1 max P.1 mmHg  LV V1 max: 142.6 cm/sec  LV V1 VTI: 25.2 cm  SV(LVOT): 103.1 ml  SI(LVOT): 53.3 ml/m2  PA V2 max: 100.0 cm/sec  PA max P.0 mmHg  PA acc time: 0.10 sec  TR max nomi: 251.0 cm/sec  TR max P.2 mmHg  AV Nomi Ratio (DI): 1.1  TASHA Index (cm2/m2): 2.1  E/E': 13.4  E/E' av.1  Lateral E/e': 8.9  Medial E/e': 13.3  Peak E' Nomi: 5.5 cm/sec  RV S Nomi: 9.6 cm/sec   _____________________________________________________________________________  Report approved by: Sultana Davenport MD on 2025 11:44 AM      US Carotid Bilateral    Narrative    EXAM: US CAROTID BILATERAL  LOCATION: [facility]  DATE: 2025  INDICATION: CABG workup  COMPARISON: None.  TECHNIQUE: Duplex exam performed utilizing 2D gray-scale imaging, Doppler interrogation with color-flow and spectral waveform analysis. The percent diameter stenosis is determined using Updated Recommendations for Carotid Stenosis Interpretation Criteria   from [employer].  FINDINGS:  RIGHT: Mild plaque at the bifurcation. The peak systolic velocity in the ICA is less than 180 cm/sec, consistent with less than 50% stenosis. Normal velocities in the ECA. Antegrade flow within the vertebral artery.   LEFT: Mild plaque at the bifurcation. The peak systolic velocity in the ICA is less than 180 cm/sec, consistent with less than 50% stenosis. Normal velocities in the ECA. Antegrade flow within the vertebral artery.  VELOCITY CHART:  CCA   Right:  cm/s   Left: 107-131 cm/s  ICA   Right: 83-93 cm/s   Left: 58-76 cm/s  ECA   Right: 101 cm/s   Left: 75 cm/s  ICA/CCA PSV Ratio   Right: 1.0   Left: 0.7      Impression    IMPRESSION:  1.  Mild plaque formation, velocities consistent with less than 50% stenosis in the right internal carotid artery.  2.  Mild plaque formation, velocities consistent with less than 50% stenosis in the left internal carotid artery.  3.  Flow  within the vertebral arteries is antegrade.   XR Chest 2 Views    Narrative    EXAM: XR CHEST 2 VIEWS  LOCATION: [facility]  DATE: 7/25/2025  INDICATION: CABG workup  COMPARISON: None.      Impression    IMPRESSION:   Mild blunting of the left costophrenic sulcus on the lateral view may represent a tiny pleural effusion. The chest is otherwise negative.   This document is created with the help of Dragon dictation system. All grammatical errors are unintentional.

## 2025-07-25 NOTE — CONSULTS
INTERVENTIONAL CARDIOLOGY INPATIENT CONSULT NOTE    [facility]   [address], [place] [address]   [email or web address]   OFFICE: [phone]          Impression and Plan     Assessment:  Severe coronary artery disease involving LM   Hyperlipidemia    Plan: We had a long discussion with Mr. Newell regarding natural progression and therapeutic options of coronary artery disease.  Recommend cardiothoracic surgery consultation for evaluation of surgical revascularization given age and LM disease.  If patient is not a candidate, percutaneous revascularization of the left main bifurcation with percutaneous LV support is an option. In the meantime will admit for expedited work up given rapidly progressive symptoms.         History of Present Illness      Mr. Hung Newell is a 55 year old male with strong family history of early cardiovascular disease and hyperlipidemia who presents with progressive angina, found to have severe coronary disease involving the left main.  Reports progressive angina with exertion.  Other than noted above, Mr. Newell denies any light headedness/dizziness, pre-syncope, syncope, lower extremity swelling, palpitations, paroxysmal nocturnal dyspnea (PND), or orthopnea.      Review of Systems:  Further review of systems is otherwise negative/noncontributory (based on review of medical record (admission H&P) and 13 point review of systems reviewed. Pertinent positives noted).    Cardiac Diagnostics     Electrocardiogram  (personally reviewed): Sinus, no acute repolarization abnormalities    Cath  (personally reviewed):   Severe coronary artery disease involving distal left main bifurcation.  Mild nonobstructive disease elsewhere.  Normal left-sided filling pressures.  No aortic valve stenosis.        Medical History  Surgical History Family History Social History   Past Medical History:   Diagnosis Date     Coronary artery disease 04/2022     coronary calcification score 44     Hyperlipidemia      Past Surgical History:   Procedure Laterality Date     PLEURAL SCARIFICATION       Family History   Problem Relation Age of Onset     Coronary Artery Disease Sister      Coronary Artery Disease Brother      Acute Myocardial Infarction Brother 58.00     Coronary Artery Disease Sister      Coronary Artery Disease Brother      Heart Failure Mother      Heart Failure Father            Social History     Socioeconomic History     Marital status:      Spouse name: Not on file     Number of children: Not on file     Years of education: Not on file     Highest education level: Not on file   Occupational History     Not on file   Tobacco Use     Smoking status: Never     Smokeless tobacco: Never   Substance and Sexual Activity     Alcohol use: Yes     Comment: Alcoholic Drinks/day: Occasional     Drug use: No     Sexual activity: Not on file   Other Topics Concern     Not on file   Social History Narrative     Not on file     Social Drivers of Health     Financial Resource Strain: High Risk (1/1/2022)    Received from [employer]    Financial Resource Strain      Difficulty of Paying Living Expenses: Not on file      Difficulty of Paying Living Expenses: Not on file   Food Insecurity: Not on file   Transportation Needs: Not on file   Physical Activity: Not on file   Stress: Not on file   Social Connections: Unknown (1/1/2022)    Received from [employer]    Social Connections      Frequency of Communication with Friends and Family: Not on file   Interpersonal Safety: Low Risk  (7/25/2025)    Interpersonal Safety      Do you feel physically and emotionally safe where you currently live?: Yes      Within the past 12 months, have you been hit, slapped, kicked or otherwise physically hurt by someone?: No      Within the past 12 months, have you been humiliated or emotionally abused in other ways by  "your partner or ex-partner?: No   Housing Stability: Not on file             Physical Examination   VITALS: /81   Pulse 91   Temp 98.3  F (36.8  C) (Oral)   Resp 14   Ht 1.702 m (5' 7\")   Wt 81.6 kg (180 lb)   SpO2 94%   BMI 28.19 kg/m    BMI: Body mass index is 28.19 kg/m .  Wt Readings from Last 3 Encounters:   07/25/25 81.6 kg (180 lb)   07/23/25 81.6 kg (180 lb)   02/27/25 82.4 kg (181 lb 9.6 oz)       Intake/Output Summary (Last 24 hours) at 7/25/2025 0905  Last data filed at 7/25/2025 0847  Gross per 24 hour   Intake 400 ml   Output --   Net 400 ml       General: pleasant male. No acute distress.   HEENT: JVP not elevated, no HJR  Lungs: clear to auscultation  COR:  regular rate and rhythm, no murmurs   Abd: soft, nt, nd  Extrem: no edema         Non-cardiac Imaging Studies Reviewed      N/A       Lab Results Reviewed    Chemistry/lipid CBC Cardiac Enzymes/BNP/TSH/INR   Recent Labs   Lab Test 07/25/25  0700   CHOL 106   HDL 47   LDL 43   TRIG 79     Recent Labs   Lab Test 07/25/25  0700 02/27/25  1416 10/06/23  0923   LDL 43 71 101*     Recent Labs   Lab Test 07/25/25  0700      POTASSIUM 4.3   CHLORIDE 103   CO2 26   *   BUN 10.0   CR 1.02   GFRESTIMATED 86   MENA 9.4     Recent Labs   Lab Test 07/25/25  0700   CR 1.02     No results for input(s): \"A1C\" in the last 97759 hours.       Recent Labs   Lab Test 07/25/25  0700   WBC 5.9   HGB 15.9   HCT 45.9   MCV 87        Recent Labs   Lab Test 07/25/25  0700   HGB 15.9    No results for input(s): \"TROPONINI\" in the last 06582 hours.  No results for input(s): \"BNP\", \"NTBNPI\", \"NTBNP\" in the last 13745 hours.  No results for input(s): \"TSH\" in the last 90862 hours.  No results for input(s): \"INR\" in the last 43286 hours.        Current Inpatient Scheduled Medications   Scheduled Meds:  Current Facility-Administered Medications   Medication Dose Route Frequency Provider Last Rate Last Admin     Continuous Infusions:  Current " "Facility-Administered Medications   Medication Dose Route Frequency Provider Last Rate Last Admin     sodium chloride 0.9 % infusion  75 mL/hr Intravenous Continuous Rachel Palencia, WILBER           No current outpatient medications on file.          Medications Prior to Admission   Prior to Admission medications   Medication Sig Start Date End Date Taking? Authorizing Provider   aspirin (ASA) 81 MG chewable tablet Take 81 mg by mouth daily.   Yes Reported, Patient   ezetimibe (ZETIA) 10 MG tablet Take 1 tablet (10 mg) by mouth daily. 2/28/25  Yes Sultana Davenport MD   rosuvastatin (CRESTOR) 40 MG tablet TAKE ONE (1) TABLET (40 MG) BY MOUTH DAILY 3/19/25  Yes Sultana Davenport MD   nitroGLYcerin (NITROSTAT) 0.4 MG sublingual tablet For chest pain place 1 tablet under the tongue every 5 minutes for 3 doses. If symptoms persist 5 minutes after 1st dose call 911. 7/23/25   Sultana Davenport MD              Clinically Significant Risk Factors Present on Admission                 # Drug Induced Platelet Defect: home medication list includes an antiplatelet medication             # Overweight: Estimated body mass index is 28.19 kg/m  as calculated from the following:    Height as of this encounter: 1.702 m (5' 7\").    Weight as of this encounter: 81.6 kg (180 lb).             Gagan Benites MD   Interventional Cardiology            "

## 2025-07-25 NOTE — CONSULTS
Cardiothoracic Surgery Consult    Date of Service: 7/26/2025    REFERRING CARDIOLOGIST: Dr. Benites    REASON FOR CONSULTATION: Consideration for surgical revascularization     HISTORY OF PRESENT ILLNESS: Hung Newell is a 55 year old year-old male who has a PMH of HLD and a strong family history of early coronary artery disease who presented to [facility] today for planned elective coronary angiogram in the setting of progressive angina with exertion. Coronary angiogram performed today demonstrates severe multi-vessel coronary artery disease as below including left main disease. CV Surgery is consulted for possible surgical revascularization.    Patient reports he was well until about 2 months ago when he experienced an episode of chest discomfort when walking down the driveway. This was associated with a little bit of fluttering in his chest. It resolved after about 5 to 10 minutes. He did well for about 1-1/2 months. About 2 weeks ago, he began to note recurrence of the symptoms with activity. Sometimes it would go away with rest. Occasionally he would continue with the activity but at a lower pace with resolution of symptoms. In the last couple days, he has noted symptoms occurring with less activity prompting him to contact our office to be seen today. Denies any episodes occurring at rest or awakening him from sleep. No prolonged episodes. Currently denies any discomfort     The patient's coronary artery disease risk factors include HLD, +family history of CAD, and overweight BMI. Patient is generally active but has been limited the past couple of months by chest discomfort as above. Patient denies history of bleeding/clotting issues and issues with anesthesia in the past. Does endorse history of vein ablations d/t vericose veins.     PAST MEDICAL HISTORY:   Past Medical History:   Diagnosis Date    Coronary artery disease 04/2022    coronary calcification score 44    Hyperlipidemia        PAST  SURGICAL HISTORY:   Past Surgical History:   Procedure Laterality Date    PLEURAL SCARIFICATION         ALLERGIES:   Allergies   Allergen Reactions    Pollen Extract           CURRENT MEDICATIONS:  Current Facility-Administered Medications   Medication Dose Route Frequency Provider Last Rate Last Admin    acetaminophen (TYLENOL) tablet 650 mg  650 mg Oral Q4H PRN Rachel Palencia, CNP        atropine injection 0.5 mg  0.5 mg Intravenous Once PRN Rachel Palencia C, CNP        fentaNYL (PF) (SUBLIMAZE) injection 25 mcg  25 mcg Intravenous Q15 Min PRN Rachel Palencia C, CNP        flumazenil (ROMAZICON) injection 0.2 mg  0.2 mg Intravenous q1 min prn Rachel Palencia C, CNP        HOLD:  Metformin and metformin containing medications if patient received IV contrast with acute kidney injury or severe chronic kidney disease (stage IV or stage V; i.e., eGFR less than 30)   Does not apply HOLD Rachel Palencia CNP        hydrALAZINE (APRESOLINE) injection 10 mg  10 mg Intravenous Once PRN Rachel Palencia C, CNP        midazolam (VERSED) injection 0.5 mg  0.5 mg Intravenous Q5 Min PRN Rachel Palencia, CNP        naloxone (NARCAN) injection 0.2 mg  0.2 mg Intravenous Q2 Min PRN Talha Rachel C, CNP        Or    naloxone (NARCAN) injection 0.4 mg  0.4 mg Intravenous Q2 Min PRN Talha Rachel C, CNP        Or    naloxone (NARCAN) injection 0.2 mg  0.2 mg Intramuscular Q2 Min PRN Talha Rachel C, CNP        Or    naloxone (NARCAN) injection 0.4 mg  0.4 mg Intramuscular Q2 Min PRN Talha Rachel C, CNP        oxyCODONE (ROXICODONE) tablet 5 mg  5 mg Oral Q4H PRN Talha Rachel C CNP        Or    oxyCODONE (ROXICODONE) tablet 10 mg  10 mg Oral Q4H PRN Talha Rachel C, CNP        sodium chloride 0.9% BOLUS 250 mL  250 mL Intravenous Once PRN Khurram Palenciale C CNP             FAMILY HISTORY:   Family History   Problem Relation Age of  Onset    Coronary Artery Disease Sister     Coronary Artery Disease Brother     Acute Myocardial Infarction Brother 58.00    Coronary Artery Disease Sister     Coronary Artery Disease Brother     Heart Failure Mother     Heart Failure Father          SOCIAL HISTORY:   Social History     Socioeconomic History    Marital status:      Spouse name: Not on file    Number of children: Not on file    Years of education: Not on file    Highest education level: Not on file   Occupational History    Not on file   Tobacco Use    Smoking status: Never    Smokeless tobacco: Never   Substance and Sexual Activity    Alcohol use: Yes     Comment: Alcoholic Drinks/day: Occasional    Drug use: No    Sexual activity: Not on file   Other Topics Concern    Not on file   Social History Narrative    Not on file     Social Drivers of Health     Financial Resource Strain: High Risk (1/1/2022)    Received from [facility]    Financial Resource Strain     Difficulty of Paying Living Expenses: Not on file     Difficulty of Paying Living Expenses: Not on file   Food Insecurity: Not on file   Transportation Needs: Not on file   Physical Activity: Not on file   Stress: Not on file   Social Connections: Unknown (1/1/2022)    Received from [facility]    Social Connections     Frequency of Communication with Friends and Family: Not on file   Interpersonal Safety: Low Risk  (7/25/2025)    Interpersonal Safety     Do you feel physically and emotionally safe where you currently live?: Yes     Within the past 12 months, have you been hit, slapped, kicked or otherwise physically hurt by someone?: No     Within the past 12 months, have you been humiliated or emotionally abused in other ways by your partner or ex-partner?: No   Housing Stability: Not on file       REVIEW OF SYSTEMS:  CONSTITUTIONAL: No weight loss, fever   SKIN: No rash  CARDIOVASCULAR: No chest pain or chest  "discomfort. No palpitations or edema.   RESPIRATORY: No shortness of breath or cough.  GASTROINTESTINAL: No nausea, vomiting or diarrhea. No abdominal pain.  NEUROLOGICAL: No headache, dizziness, syncope  HEMATOLOGIC: No anemia, bleeding or bruising.     PHYSICAL EXAMINATION:   Vitals: /76 (BP Location: Left arm)   Pulse 58   Temp 98  F (36.7  C) (Oral)   Resp 18   Ht 1.702 m (5' 7\")   Wt 81.6 kg (180 lb)   SpO2 93%   BMI 28.19 kg/m    GENERAL:  Well developed and well nourished   CARDIOVASCULAR: RRR on monitor; no edema.  RESPIRATIONS: Non-labored breathing on room air.  ABDOMEN: Soft, non-distended, non-tender  EXTREMITIES: No deformity, no edema  NEUROLOGIC: Intact and symmetric with no focal deficits.   PSYCHIATRIC: Alert and oriented x3, pleasant     LABORATORY STUDIES:   Lab Results   Component Value Date    WBC 5.9 07/25/2025    HGB 15.9 07/25/2025    HCT 45.9 07/25/2025    MCV 87 07/25/2025     07/25/2025      Lab Results   Component Value Date    BUN 10.0 07/25/2025     07/25/2025    CO2 26 07/25/2025     Lab Results   Component Value Date    A1C 5.7 07/25/2025        EKG 7/25/2025:   Sinus rhythm with an atrioventricular rate of 83 bpm    CARDIAC CATHETERIZATION  7/25/2025:   Coronary Angiography:  LEFT MAIN: Normal caliber vessel that bifurcates into left anterior descending and left circumflex arteries.  The proximal left main has mild disease followed by 70% stenosis in the distal portion involving the bifurcation.  LEFT ANTERIOR DESCENDING: Large vessel that gives rise to medium diagonal 1 branch, a large diagonal 2 branch, multiple septal perforators.  The LAD wraps around the apex distally.  The the proximal LAD has 70% stenosis followed by mild disease of the LAD and its branches distally.  LEFT CIRCUMFLEX: Nondominant vessel that gives rise to a large OM branch and terminates into a small vessel distally.  The ostial proximal OM branch has 90% stenosis followed by mild " disease distally.  RIGHT CORONARY ARTERY: Large dominant vessel that gives rise to the right posterior descending artery and posterolateral system.  The right coronary artery and its branches have mild disease.    TRANSTHORACIC ECHOCARDIOGRAM 7/25/2025:   Left Ventricle  The left ventricle is normal in size. There is normal left ventricular wall  thickness. The visual ejection fraction is 60-65%. Left ventricular diastolic  function is normal. No regional wall motion abnormalities noted.     Right Ventricle  The right ventricle is normal in size and function.     Atria  The left atrium is borderline dilated. Right atrial size is normal. Intact  atrial septum.     Mitral Valve  Mitral valve leaflets appear normal. There is no evidence of mitral stenosis  or clinically significant mitral regurgitation.     Tricuspid Valve  Tricuspid valve leaflets appear normal. There is mild (1+) tricuspid  regurgitation. The right ventricular systolic pressure is approximated at  22mmHg plus the right atrial pressure.     Aortic Valve  Aortic valve leaflets appear normal. There is no evidence of aortic stenosis  or clinically significant aortic regurgitation.     Pulmonic Valve  The pulmonic valve is normal in structure and function.     Vessels  The aorta root is normal. Normal size ascending aorta. IVC diameter <2.1 cm  collapsing >50% with sniff suggests a normal RA pressure of 3 mmHg.     Pericardium  There is no pericardial effusion.    CAROTID ULTRASOUND:  IMPRESSION:  1.  Mild plaque formation, velocities consistent with less than 50% stenosis in the right internal carotid artery.  2.  Mild plaque formation, velocities consistent with less than 50% stenosis in the left internal carotid artery.  3.  Flow within the vertebral arteries is antegrad    CXR:  IMPRESSION:   Mild blunting of the left costophrenic sulcus on the lateral view may represent a tiny pleural effusion. The chest is otherwise negative.    IMPRESSION AND PLAN:  Hung Newell is a 55 year old with severe multi-vessel coronary artery disease and accelerating exertional angina. Echocardiography demonstrates preserved left ventricular function with an LVEF of 55-60%. After review of the above information, we believe that he is a reasonable surgical candidate. Our team discussed the technical details of coronary artery bypass grafting with the patient, as well as the expected postoperative course and recovery. The risks include but are not limited to bleeding, infection, stroke, heart or graft failure, dysrhythmia, respiratory failure, kidney or liver injury, bowel or limb ischemia, and death.     His calculated STS risk for mortality is 0.332%. The calculated risk of major morbidity or mortality is 2.37% with risk of permanent stroke being 0.404%.     - Tentative plan for CABG in the coming days pending patient consent and further testing. Possibly Tuesday 7/29.   - Preop workup will be complete after vein mapping (ordered and pending)  - Preop orders will be signed and held when case is officially scheduled.  - Recommend patient remain admitted until surgery given LM disease and accelerating symptoms.    - Remainder of cares per primary team.       Thank you very much for this referral.       Patient and plan discussed with attending.      STS RISK:        Rachell Mae PA-C  [facility] Cardiothoracic Surgery  Vocera or Secure Chat  July 26, 2025

## 2025-07-25 NOTE — PHARMACY-ADMISSION MEDICATION HISTORY
Pharmacist Admission Medication History    Admission medication history is complete. The information provided in this note is only as accurate as the sources available at the time of the update.    Information Source(s): Patient and CareEverywhere/SureScripts via in-person    Allergies reviewed with patient and updates made in EHR: no    Medication History Completed By: Negra Silva RPH 7/25/2025 9:40 AM    PTA Med List   Medication Sig Last Dose/Taking    aspirin (ASA) 81 MG chewable tablet Take 81 mg by mouth daily. 7/25/2025 Morning    ezetimibe (ZETIA) 10 MG tablet Take 1 tablet (10 mg) by mouth daily. 7/25/2025 Morning    nitroGLYcerin (NITROSTAT) 0.4 MG sublingual tablet For chest pain place 1 tablet under the tongue every 5 minutes for 3 doses. If symptoms persist 5 minutes after 1st dose call 911. Unknown    rosuvastatin (CRESTOR) 40 MG tablet TAKE ONE (1) TABLET (40 MG) BY MOUTH DAILY 7/25/2025 Morning

## 2025-07-25 NOTE — PLAN OF CARE
"  Problem: Adult Inpatient Plan of Care  Goal: Plan of Care Review  Description: The Plan of Care Review/Shift note should be completed every shift.  The Outcome Evaluation is a brief statement about your assessment that the patient is improving, declining, or no change.  This information will be displayed automatically on your shift  note.  Outcome: Progressing  Flowsheets (Taken 7/25/2025 1350)  Plan of Care Reviewed With: patient  Goal: Patient-Specific Goal (Individualized)  Description: You can add care plan individualizations to a care plan. Examples of Individualization might be:  \"Parent requests to be called daily at 9am for status\", \"I have a hard time hearing out of my right ear\", or \"Do not touch me to wake me up as it startles  me\".  Outcome: Progressing  Goal: Absence of Hospital-Acquired Illness or Injury  Outcome: Progressing  Intervention: Identify and Manage Fall Risk  Recent Flowsheet Documentation  Taken 7/25/2025 1300 by Piyush Delgado RN  Safety Promotion/Fall Prevention: activity supervised  Intervention: Prevent Skin Injury  Recent Flowsheet Documentation  Taken 7/25/2025 1300 by Piyush Delgado RN  Body Position: position changed independently  Goal: Optimal Comfort and Wellbeing  Outcome: Progressing  Goal: Readiness for Transition of Care  Outcome: Progressing   Goal Outcome Evaluation:      Plan of Care Reviewed With: patient               Pt is alert and oriented x4. Pt is med complaint. Pt denies pain. Pt's v/s is stable. Pt is up independently. No complain. Continue to monitor pt.      "

## 2025-07-25 NOTE — PLAN OF CARE
Goal Outcome Evaluation:         Pt transferred to  from [facility] after CA/p.PCI. No stent placement was done. Surgical consult.  All questions answered. Report and care given to Chelle Lima RN                      Tried to reach family to schedule follow up, left voicemail to call office back. If family return call per Dr. Augustin follow up in 3 months.

## 2025-07-25 NOTE — PLAN OF CARE
Goal Outcome Evaluation:         Pt admitted for CA/p.PCi [place] due to chest discomfort and freq PVC's. Pt prepped and ready for procedure. Pt anxious, will receive valium. Pt's wife Amy is here for support.      Rekha Lima RN

## 2025-07-25 NOTE — INTERVAL H&P NOTE
"I have reviewed the surgical (or preoperative) H&P that is linked to this encounter, and examined the patient. There are no significant changes    Clinical Conditions Present on Arrival:  Clinically Significant Risk Factors Present on Admission                  # Drug Induced Platelet Defect: home medication list includes an antiplatelet medication      # Overweight: Estimated body mass index is 28.19 kg/m  as calculated from the following:    Height as of this encounter: 1.702 m (5' 7\").    Weight as of this encounter: 81.6 kg (180 lb).       "

## 2025-07-26 ENCOUNTER — APPOINTMENT (OUTPATIENT)
Dept: ULTRASOUND IMAGING | Facility: HOSPITAL | Age: 55
DRG: 234 | End: 2025-07-26
Payer: COMMERCIAL

## 2025-07-26 PROBLEM — R73.03 PRE-DIABETES: Status: ACTIVE | Noted: 2025-07-26

## 2025-07-26 LAB
ANION GAP SERPL CALCULATED.3IONS-SCNC: 10 MMOL/L (ref 7–15)
ATRIAL RATE - MUSE: 66 BPM
BUN SERPL-MCNC: 9.3 MG/DL (ref 6–20)
CA-I BLD-MCNC: 4.7 MG/DL (ref 4.4–5.2)
CALCIUM SERPL-MCNC: 9.3 MG/DL (ref 8.8–10.4)
CHLORIDE SERPL-SCNC: 103 MMOL/L (ref 98–107)
CREAT SERPL-MCNC: 0.99 MG/DL (ref 0.67–1.17)
DIASTOLIC BLOOD PRESSURE - MUSE: NORMAL MMHG
EGFRCR SERPLBLD CKD-EPI 2021: 89 ML/MIN/1.73M2
GLUCOSE SERPL-MCNC: 91 MG/DL (ref 70–99)
HCO3 SERPL-SCNC: 27 MMOL/L (ref 22–29)
HGB BLD-MCNC: 14.8 G/DL (ref 13.3–17.7)
INTERPRETATION ECG - MUSE: NORMAL
MAGNESIUM SERPL-MCNC: 2 MG/DL (ref 1.7–2.3)
MCV RBC AUTO: 87 FL (ref 78–100)
NT-PROBNP SERPL-MCNC: <36 PG/ML (ref 0–177)
P AXIS - MUSE: 53 DEGREES
POTASSIUM SERPL-SCNC: 4.5 MMOL/L (ref 3.4–5.3)
POTASSIUM SERPL-SCNC: 4.5 MMOL/L (ref 3.4–5.3)
PR INTERVAL - MUSE: 164 MS
QRS DURATION - MUSE: 94 MS
QT - MUSE: 378 MS
QTC - MUSE: 396 MS
R AXIS - MUSE: 33 DEGREES
SODIUM SERPL-SCNC: 140 MMOL/L (ref 135–145)
SYSTOLIC BLOOD PRESSURE - MUSE: NORMAL MMHG
T AXIS - MUSE: 22 DEGREES
VENTRICULAR RATE- MUSE: 66 BPM

## 2025-07-26 PROCEDURE — 250N000013 HC RX MED GY IP 250 OP 250 PS 637: Performed by: INTERNAL MEDICINE

## 2025-07-26 PROCEDURE — 99232 SBSQ HOSP IP/OBS MODERATE 35: CPT | Performed by: INTERNAL MEDICINE

## 2025-07-26 PROCEDURE — 99222 1ST HOSP IP/OBS MODERATE 55: CPT | Mod: FS | Performed by: THORACIC SURGERY (CARDIOTHORACIC VASCULAR SURGERY)

## 2025-07-26 PROCEDURE — 99222 1ST HOSP IP/OBS MODERATE 55: CPT | Performed by: INTERNAL MEDICINE

## 2025-07-26 PROCEDURE — 83735 ASSAY OF MAGNESIUM: CPT | Performed by: INTERNAL MEDICINE

## 2025-07-26 PROCEDURE — 80048 BASIC METABOLIC PNL TOTAL CA: CPT | Performed by: INTERNAL MEDICINE

## 2025-07-26 PROCEDURE — 82330 ASSAY OF CALCIUM: CPT | Performed by: INTERNAL MEDICINE

## 2025-07-26 PROCEDURE — 93970 EXTREMITY STUDY: CPT

## 2025-07-26 PROCEDURE — 82435 ASSAY OF BLOOD CHLORIDE: CPT | Performed by: NURSE PRACTITIONER

## 2025-07-26 PROCEDURE — 36415 COLL VENOUS BLD VENIPUNCTURE: CPT | Performed by: NURSE PRACTITIONER

## 2025-07-26 PROCEDURE — 93005 ELECTROCARDIOGRAM TRACING: CPT

## 2025-07-26 PROCEDURE — 85018 HEMOGLOBIN: CPT | Performed by: INTERNAL MEDICINE

## 2025-07-26 PROCEDURE — 210N000001 HC R&B IMCU HEART CARE

## 2025-07-26 PROCEDURE — 83880 ASSAY OF NATRIURETIC PEPTIDE: CPT | Performed by: INTERNAL MEDICINE

## 2025-07-26 PROCEDURE — 93010 ELECTROCARDIOGRAM REPORT: CPT | Performed by: INTERNAL MEDICINE

## 2025-07-26 PROCEDURE — 36415 COLL VENOUS BLD VENIPUNCTURE: CPT | Performed by: INTERNAL MEDICINE

## 2025-07-26 RX ORDER — METOPROLOL SUCCINATE 25 MG/1
25 TABLET, EXTENDED RELEASE ORAL DAILY
Status: DISCONTINUED | OUTPATIENT
Start: 2025-07-26 | End: 2025-07-28

## 2025-07-26 RX ORDER — LORAZEPAM 0.5 MG/1
0.5 TABLET ORAL AT BEDTIME
Status: DISCONTINUED | OUTPATIENT
Start: 2025-07-26 | End: 2025-07-29

## 2025-07-26 RX ADMIN — LORAZEPAM 0.5 MG: 0.5 TABLET ORAL at 21:18

## 2025-07-26 RX ADMIN — METOPROLOL SUCCINATE 25 MG: 25 TABLET, FILM COATED, EXTENDED RELEASE ORAL at 10:54

## 2025-07-26 RX ADMIN — EZETIMIBE 10 MG: 10 TABLET ORAL at 09:18

## 2025-07-26 RX ADMIN — ROSUVASTATIN 40 MG: 40 TABLET, FILM COATED ORAL at 09:18

## 2025-07-26 RX ADMIN — Medication 3 MG: at 21:18

## 2025-07-26 RX ADMIN — ASPIRIN 81 MG CHEWABLE TABLET 81 MG: 81 TABLET CHEWABLE at 09:18

## 2025-07-26 ASSESSMENT — ACTIVITIES OF DAILY LIVING (ADL)
ADLS_ACUITY_SCORE: 42
ADLS_ACUITY_SCORE: 42
ADLS_ACUITY_SCORE: 16
ADLS_ACUITY_SCORE: 42
ADLS_ACUITY_SCORE: 16
ADLS_ACUITY_SCORE: 16
ADLS_ACUITY_SCORE: 42
ADLS_ACUITY_SCORE: 42
ADLS_ACUITY_SCORE: 16
ADLS_ACUITY_SCORE: 42
ADLS_ACUITY_SCORE: 16
ADLS_ACUITY_SCORE: 42
ADLS_ACUITY_SCORE: 42
ADLS_ACUITY_SCORE: 16
ADLS_ACUITY_SCORE: 42

## 2025-07-26 NOTE — PROVIDER NOTIFICATION
"0052: Notified Dr. Richardson of increased PVC's and feeling of \"fluttering\" in patient's chest following activity. No chest pain noted at this time. Patient was having Trigeminal and Bigeminal PVC's. Dr. Richardson ordered MG, K and Ionized Calcium labs and a STAT EKG. All results came back WDL however Dr. Richardson started a bedrest order for patient d/t symptomatic changes. He has been Bedrest since 0300. Will continue to monitor.  "

## 2025-07-26 NOTE — CONSULTS
"HEART CARE NOTE        Thank you, Dr. Abad, for asking the [facility] team to see Hung Newell to evaluate severe Multi-vessel CAD.    Assessment/Recommendations     1.  Severe multi-vessel CAD  Assessment / Plan  Patient denies chest pain or anginal equivalents; continue aggressive risk factor modifications  Patient is high risk fro adverse cardiac events 2/2 CAD, HTN, HLP  CTS team consulted - please see detailed notes in chart re:plan of care    2. HTN  Assessment / Plan  Adequate control; Management per primary team    3. HLP  Assessment / Plan  Currently on high intensity rosuvastatin      Clinically Significant Risk Factors                              # Overweight: Estimated body mass index is 27.21 kg/m  as calculated from the following:    Height as of this encounter: 1.702 m (5' 7\").    Weight as of this encounter: 78.8 kg (173 lb 11.2 oz)., PRESENT ON ADMISSION           Native vessel CAD    60 minutes spent reviewing prior records (including documentation, laboratory studies, cardiac testing/imaging), history and physical exam, planning, and subsequent documentation.    History of Present Illness/Subjective    Mr. Hung Newell is a 55 year old male with a PMHx significant for (per Epic notation) HLD, premature CAD, with coronary calcium score of 44, who presented to the clinic on 7/25 for evaluation of exertional chest pain.     Today, Mr. Andino denies acute cardiac events or complaints; denies current chest pain or anginal equivalents; Management plan as detailed above    ECG: Personally reviewed. normal sinus rhythm, nonspecific ST and T waves changes, occasional PVC noted, unifocal.    ECHO (personnaly Reviewed on 7/26/25):   The visual ejection fraction is 60-65%.  No regional wall motion abnormalities noted.  The right ventricle is normal in size and function.  No significant valve disease.    Lab results: personally reviewed July 26, 2025; notable for renal " "function wnl    Medical history and pertinent documents reviewed in Care Everywhere please where applicable see details above          Physical Examination Review of Systems   /76   Pulse 86   Temp 98.3  F (36.8  C) (Oral)   Resp 18   Ht 1.702 m (5' 7\")   Wt 78.8 kg (173 lb 11.2 oz)   SpO2 95%   BMI 27.21 kg/m    Body mass index is 27.21 kg/m .  Wt Readings from Last 3 Encounters:   07/26/25 78.8 kg (173 lb 11.2 oz)   07/23/25 81.6 kg (180 lb)   02/27/25 82.4 kg (181 lb 9.6 oz)     General Appearance:   no distress, normal body habitus   ENT/Mouth: membranes moist, no oral lesions or bleeding gums.      EYES:  no scleral icterus, normal conjunctivae   Neck: no carotid bruits or thyromegaly   Chest/Lungs:   lungs are clear to auscultation, no rales or wheezing, equal chest wall expansion    Cardiovascular:   Regular. Normal first and second heart sounds with no murmurs, rubs, or gallops; the carotid, radial and posterior tibial pulses are intact, no JVD    Abdomen:  no organomegaly, masses, bruits, or tenderness; bowel sounds are present   Extremities: no cyanosis or clubbing   Skin: no xanthelasma, warm.    Neurologic: NAD     Psychiatric: alert and oriented x3, calm     A complete 10 systems ROS was reviewed  And is negative except what is listed in the HPI.          Medical History  Surgical History Family History Social History   Past Medical History:   Diagnosis Date     Coronary artery disease 04/2022    coronary calcification score 44     Hyperlipidemia     Past Surgical History:   Procedure Laterality Date     PLEURAL SCARIFICATION      no family history of premature coronary artery disease Social History     Socioeconomic History     Marital status:      Spouse name: Not on file     Number of children: Not on file     Years of education: Not on file     Highest education level: Not on file   Occupational History     Not on file   Tobacco Use     Smoking status: Never     Smokeless " "tobacco: Never   Substance and Sexual Activity     Alcohol use: Yes     Comment: Alcoholic Drinks/day: Occasional     Drug use: No     Sexual activity: Not on file   Other Topics Concern     Not on file   Social History Narrative     Not on file     Social Drivers of Health     Financial Resource Strain: High Risk (1/1/2022)    Received from [facility]    Financial Resource Strain      Difficulty of Paying Living Expenses: Not on file      Difficulty of Paying Living Expenses: Not on file   Food Insecurity: Not on file   Transportation Needs: Not on file   Physical Activity: Not on file   Stress: Not on file   Social Connections: Unknown (1/1/2022)    Received from [facility]    Social Connections      Frequency of Communication with Friends and Family: Not on file   Interpersonal Safety: Low Risk  (7/25/2025)    Interpersonal Safety      Do you feel physically and emotionally safe where you currently live?: Yes      Within the past 12 months, have you been hit, slapped, kicked or otherwise physically hurt by someone?: No      Within the past 12 months, have you been humiliated or emotionally abused in other ways by your partner or ex-partner?: No   Housing Stability: Not on file           Lab Results    Chemistry/lipid CBC Cardiac Enzymes/BNP/TSH/INR   Lab Results   Component Value Date    CHOL 106 07/25/2025    HDL 47 07/25/2025    TRIG 79 07/25/2025    BUN 9.3 07/26/2025     07/26/2025    CO2 27 07/26/2025    Lab Results   Component Value Date    WBC 5.9 07/25/2025    HGB 14.8 07/26/2025    HCT 45.9 07/25/2025    MCV 87 07/26/2025     07/25/2025    No results found for: \"CKTOTAL\", \"CKMB\", \"TROPONINI\", \"BNP\", \"TSH\", \"INR\"  No results found for: \"CKTOTAL\", \"CKMB\", \"TROPONINI\"       Weight:    Wt Readings from Last 3 Encounters:   07/26/25 78.8 kg (173 lb 11.2 oz)   07/23/25 81.6 kg (180 lb)   02/27/25 82.4 kg (181 lb 9.6 oz) "       Allergies  Allergies   Allergen Reactions     Pollen Extract          Surgical History  Past Surgical History:   Procedure Laterality Date     PLEURAL SCARIFICATION         Social History  Tobacco:   History   Smoking Status     Never   Smokeless Tobacco     Never    Alcohol:   Social History    Substance and Sexual Activity      Alcohol use: Yes        Comment: Alcoholic Drinks/day: Occasional   Illicit Drugs:   History   Drug Use No       Family History  Family History   Problem Relation Age of Onset     Coronary Artery Disease Sister      Coronary Artery Disease Brother      Acute Myocardial Infarction Brother 58.00     Coronary Artery Disease Sister      Coronary Artery Disease Brother      Heart Failure Mother      Heart Failure Father           Annie Tamez MD on 7/26/2025      cc: No Ref-Primary, Physician,

## 2025-07-26 NOTE — PLAN OF CARE
Problem: Adult Inpatient Plan of Care  Goal: Optimal Comfort and Wellbeing  Outcome: Progressing     Problem: Cardiac Catheterization (Diagnostic/Interventional)  Goal: Stable Heart Rate and Rhythm  Outcome: Progressing     Problem: Cardiac Catheterization (Diagnostic/Interventional)  Goal: Optimal Pain Control and Function  Outcome: Progressing    Patient is A&Ox 4. He has no c/o numbness and tingling in any extremities. He is IND for all cares and transfers. But is on bedrest since 0300. He is continent of bowel and bladder. VSS. Patient denied all c/o pain this shift. He remains in NSR w/ PVC's throughout the shift (more frequent PVC's following activity). Pt. denies all chest pain, SOB, dizziness, and nausea. BS active in A4Q. Skin intact. Right radial angio site is WDL. Left arm PIV was flushed, capped, and saline locked. Patient is lying in bed with call light in reach. Urinal within reach. Staff will continue to monitor.

## 2025-07-26 NOTE — CONSULTS
NUTRITION EDUCATION      REASON FOR ASSESSMENT:  Consulted to educate on heart healthy and diabetic diet    NUTRITION HISTORY:  Information obtained from pt and his wife    Pt usually eats very healthy at home. He eats salads without any salad dressing, for snacks has carrot sticks and blueberries.  Does not eat butter or ice cream. Has a small amount of 2% milk at times.His wife does not add salt in the cooking and they do not salt their food at the table    CURRENT DIET:  regular    NUTRITION DIAGNOSIS:  Food- and nutrition-related knowledge deficit R/t CAD and prediabetes as evidenced by consulted for diet instruction on heart healthy/diabetic diet    INTERVENTIONS:    Nutrition Prescription:  Consistent CHO, low saturated fat, low sodium    Implementation:      *  Nutrition Education (Content):   A)  Provided handout DM placemat ( food groups and serving sizes) heart healthy consistent carbohydrate nutrition therapy and CHO counting for people with diabetes   B)  Discussed foods containing CHO and portion sizes, label reading: Consistent CHO, saturated fats to avoid and healthy fats to include in diet.  Recommend continuing low sodium diet.       *  Nutrition Education (Application):   A)  Discussed current eating habits and recommended alternative food choices      *  Anticipate good compliance      *  Diet Education - refer to Education Flowsheet    Goals:      *  Patient will verbalize understanding of diet met      *  All of the above goals met during the education session    Follow Up/Monitoring:      *  Provided RD contact information for future questions      *  Recommended Out-Patient Nutrition Referral, if further diet instructions are needed

## 2025-07-26 NOTE — PROGRESS NOTES
"[facility]    Medicine Progress Note - Hospitalist Service    Date of Admission:  7/25/2025    Assessment & Plan   Hung Newell is a 55 year old male admitted on 7/25/2025.     #CAD, involving left main coronary artery.  History of elevated calcium score 44 in 2022.  #Unstable angina, exertional chest pain, s/p coronary angiogram 7/25 showed severe left main disease.  - Cardiothoracic surgery consult  - On aspirin, Crestor, Zetia.    #HLD.  On rosuvastatin 40 mg, Zetia 10 mg.  Check a.m. lipids.    #Prediabetes.  A1c 5.7.  D/W lifestyle modifications.  RD consulted.    Diet: Regular Diet Adult    DVT Prophylaxis: Pneumatic Compression Devices  Quinn Catheter: Not present  Lines: None     Cardiac Monitoring: ACTIVE order. Indication: Post- PCI/Angiogram (24 hours)  Code Status: Full Code      Clinically Significant Risk Factors        # Overweight: Estimated body mass index is 27.21 kg/m  as calculated from the following:    Height as of this encounter: 1.702 m (5' 7\").    Weight as of this encounter: 78.8 kg (173 lb 11.2 oz)., PRESENT ON ADMISSION         Social Drivers of Health     Received from [email or web address]    Financial Resource Strain    Received from [employer]    Social Connections      Disposition Plan     Medically Ready for Discharge: Anticipated in 5+ Days    Azul Abad MD  Hospitalist Service  [facility]  Securely message with edo (more info)  Text page via gumi Paging/Directory   ______________________________________________________________________    Interval History   No new complaints today.  Patient was seen by cardiothoracic surgery, who offered CABG.  Patient in agreement.  Tentatively surgery scheduled for 7/29.  Seen by earlier today, regarding medication of diabetic diet.  Denies chest pain, dyspnea, cough, cardiac potation.  Per telemetry review, frequent PVCs.  " Started on BB.    Physical Exam   Vital Signs: Temp: 98.5  F (36.9  C) Temp src: Oral BP: 130/78 Pulse: 73   Resp: 16 SpO2: 93 % O2 Device: None (Room air)    Weight: 173 lbs 11.2 oz  General: Alert and oriented x 3. Not in obvious distress.  HEENT: NC, AT. Neck- supple, No JVP elevation, lymphadenopathy or thyromegaly. Trachea-central.  Chest: Clear to auscultation bilaterally.  Heart: S1S2 regular. No M/R/G.  Abdomen: Soft. NT, ND. No organomegaly. Bowel sounds- active.  Back: No spine tenderness. No CVA tenderness.  Extremities: No leg swelling. Peripheral pulses 2+ bilaterally.  Neuro: Cranial nerves 1-12 grossly normal. No focal neurological deficit    Medical Decision Making       37 MINUTES SPENT BY ME on the date of service doing chart review, history, exam, documentation & further activities per the note.      Data     I have personally reviewed the following data over the past 24 hrs:    N/A  \   14.8   / N/A     140 103 9.3 /  91   4.5 27 0.99 \     Trop: N/A BNP: <36       Imaging results reviewed over the past 24 hrs:   Recent Results (from the past 24 hours)   US Carotid Bilateral    Narrative    EXAM: US CAROTID BILATERAL  LOCATION: [facility]  DATE: 7/25/2025    INDICATION: CABG workup  COMPARISON: None.  TECHNIQUE: Duplex exam performed utilizing 2D gray-scale imaging, Doppler interrogation with color-flow and spectral waveform analysis. The percent diameter stenosis is determined using Updated Recommendations for Carotid Stenosis Interpretation Criteria   from [employer] [facility].    FINDINGS:    RIGHT: Mild plaque at the bifurcation. The peak systolic velocity in the ICA is less than 180 cm/sec, consistent with less than 50% stenosis. Normal velocities in the ECA. Antegrade flow within the vertebral artery.     LEFT: Mild plaque at the bifurcation. The peak systolic velocity in the ICA is less than 180 cm/sec, consistent with less than 50% stenosis. Normal velocities in  the ECA. Antegrade flow within the vertebral artery.    VELOCITY CHART:  CCA   Right:  cm/s   Left: 107-131 cm/s  ICA   Right: 83-93 cm/s   Left: 58-76 cm/s  ECA   Right: 101 cm/s   Left: 75 cm/s  ICA/CCA PSV Ratio   Right: 1.0   Left: 0.7      Impression    IMPRESSION:  1.  Mild plaque formation, velocities consistent with less than 50% stenosis in the right internal carotid artery.  2.  Mild plaque formation, velocities consistent with less than 50% stenosis in the left internal carotid artery.  3.  Flow within the vertebral arteries is antegrade.   XR Chest 2 Views    Narrative    EXAM: XR CHEST 2 VIEWS  LOCATION: [facility]  DATE: 7/25/2025    INDICATION: CABG workup  COMPARISON: None.      Impression    IMPRESSION:   Mild blunting of the left costophrenic sulcus on the lateral view may represent a tiny pleural effusion. The chest is otherwise negative.

## 2025-07-26 NOTE — PLAN OF CARE
Problem: Adult Inpatient Plan of Care  Goal: Plan of Care Review  Description: The Plan of Care Review/Shift note should be completed every shift.  The Outcome Evaluation is a brief statement about your assessment that the patient is improving, declining, or no change.  This information will be displayed automatically on your shift  note.  Outcome: Progressing   Goal Outcome Evaluation:         Pt A&OX4, up indep in room. Denies chest pain or SOB. Did notice more PVCs after activity, started on metoprolol and few PVCs noted. Heart surgery education reinforced.  Wife present at bedside. Pt practicing with sternal precautions and using heart pillow.

## 2025-07-27 PROCEDURE — 210N000001 HC R&B IMCU HEART CARE

## 2025-07-27 PROCEDURE — 99232 SBSQ HOSP IP/OBS MODERATE 35: CPT | Performed by: INTERNAL MEDICINE

## 2025-07-27 PROCEDURE — 250N000013 HC RX MED GY IP 250 OP 250 PS 637: Performed by: INTERNAL MEDICINE

## 2025-07-27 PROCEDURE — 99233 SBSQ HOSP IP/OBS HIGH 50: CPT | Performed by: INTERNAL MEDICINE

## 2025-07-27 RX ADMIN — ROSUVASTATIN 40 MG: 40 TABLET, FILM COATED ORAL at 08:47

## 2025-07-27 RX ADMIN — LORAZEPAM 0.5 MG: 0.5 TABLET ORAL at 20:16

## 2025-07-27 RX ADMIN — EZETIMIBE 10 MG: 10 TABLET ORAL at 08:47

## 2025-07-27 RX ADMIN — METOPROLOL SUCCINATE 25 MG: 25 TABLET, FILM COATED, EXTENDED RELEASE ORAL at 08:47

## 2025-07-27 RX ADMIN — Medication 3 MG: at 20:16

## 2025-07-27 RX ADMIN — ASPIRIN 81 MG CHEWABLE TABLET 81 MG: 81 TABLET CHEWABLE at 08:47

## 2025-07-27 ASSESSMENT — ACTIVITIES OF DAILY LIVING (ADL)
ADLS_ACUITY_SCORE: 16
ADLS_ACUITY_SCORE: 16
ADLS_ACUITY_SCORE: 22
ADLS_ACUITY_SCORE: 16
ADLS_ACUITY_SCORE: 22
ADLS_ACUITY_SCORE: 16
ADLS_ACUITY_SCORE: 22
ADLS_ACUITY_SCORE: 16
ADLS_ACUITY_SCORE: 22
ADLS_ACUITY_SCORE: 22
ADLS_ACUITY_SCORE: 16
ADLS_ACUITY_SCORE: 22
ADLS_ACUITY_SCORE: 16
ADLS_ACUITY_SCORE: 16
ADLS_ACUITY_SCORE: 22
ADLS_ACUITY_SCORE: 16
ADLS_ACUITY_SCORE: 22

## 2025-07-27 NOTE — PLAN OF CARE
Problem: Adult Inpatient Plan of Care  Goal: Plan of Care Review  Description: The Plan of Care Review/Shift note should be completed every shift.  The Outcome Evaluation is a brief statement about your assessment that the patient is improving, declining, or no change.  This information will be displayed automatically on your shift  note.  Outcome: Progressing     Problem: Adult Inpatient Plan of Care  Goal: Optimal Comfort and Wellbeing  Outcome: Progressing     Goal Outcome Evaluation:    Pt calm and pleasant overnight. Denies pain.  Vitals stable.  SR w/ freq PVC- asymptomatic.

## 2025-07-27 NOTE — PROGRESS NOTES
Brief CV Surgery Note        Briefly, Hung Newell is a 55 year old male w/ a PMH of HLD and a strong family history of early coronary artery disease who presented to [facility] today for planned elective coronary angiogram in the setting of progressive angina with exertion. Coronary angiogram performed today demonstrates severe multi-vessel coronary artery disease as below including left main disease. CV Surgery is consulted for possible surgical revascularization.     Patient seen and examined by our team today. Reports he feels well. Denies chest pain, SOB. All questions answered to patient's satisfaction.     Vitals stable on room air. No new labs to review. Off heparin gtt. No current meds that would need to be held at this point prior to surgery.    US bilateral lower extremity vein mapping 7/26/2025:  LOWER  EXTREMITY FINDINGS:        VENOUS DIAMETERS  RIGHT GREAT SAPHENOUS VEIN  Upper Thigh: 5.0 mm  Mid Thigh: 2.4 mm  Lower Thigh: NV   Knee: NV   Upper Calf: NV   Mid Calf: NV   Lower Calf: NV   Ankle: NV      LEFT GREAT SAPHENOUS VEIN  Upper Thigh: 5.5 mm  Mid Thigh: NV mm  Lower Thigh: 2.3 mm  Knee: NV   Upper Calf: NV   Mid Calf: NV   Lower Calf: Noncompressible   Ankle: Partially compressible      RIGHT SMALL SAPHENOUS VEIN  Upper Calf: 2.7 mm  Mid Calf: 2.1 mm  Lower Calf: 2.7 mm  Ankle: 3.0 mm     LEFT SMALL SAPHENOUS VEIN  Upper Calf: 4.8 mm  Mid Calf: 2.4 mm  Lower Calf: Partially compressible   Ankle: Partially compressible       - Plan for CABG w/ Dr. Leiva on Tuesday 7/29/2025.  - Wisam's test at bedside suggests intact palmar arch.  - Preop workup complete  - Preop orders signed and held  - Prefer patient to remain admitted until surgery.    - Remainder of cares per primary team.  - We will follow.  - Please feel free to reach out w/ questions or concerns.       Rachell Mae PA-C  [facility] Cardiothoracic Surgery  Pager: [phone]  July 27, 2025

## 2025-07-27 NOTE — PROGRESS NOTES
"  HEART CARE NOTE          Assessment/Recommendations     1.  Severe multi-vessel CAD  Assessment / Plan  Patient continues to deny chest pain or anginal equivalents; continue aggressive risk factor modifications  Patient is high risk fro adverse cardiac events 2/2 CAD, HTN, HLP  CTS team consulted - Please contact their team directly with any questions or concerns regarding OHS    2. HTN  Assessment / Plan  Adequate control; Management per primary team     3. HLP  Assessment / Plan  Currently on high intensity rosuvastatin    Plan of care discussed on July 27, 2025 with patient at bedside; plan communicated to primary team overseeing patient's care via chart      History of Present Illness/Subjective    Mr. Hung Newell is a 55 year old male with a PMHx significant for (per Epic notation) HLD, premature CAD, with coronary calcium score of 44, who presented to the clinic on 7/25 for evaluation of exertional chest pain.      Today, Mr. Andino denies acute cardiac events or complaints; denies current chest pain or anginal equivalents; Management plan as detailed above     ECG: Personally reviewed. normal sinus rhythm, nonspecific ST and T waves changes, occasional PVC noted, unifocal.     ECHO (personnaly Reviewed on 7/26/25):   The visual ejection fraction is 60-65%.  No regional wall motion abnormalities noted.  The right ventricle is normal in size and function.  No significant valve disease.    Medical history and pertinent documents reviewed in Care Everywhere please where applicable see details above        Physical Examination Review of Systems   /61 (BP Location: Left arm)   Pulse 74   Temp 98.4  F (36.9  C) (Oral)   Resp 18   Ht 1.702 m (5' 7\")   Wt 78.3 kg (172 lb 9.6 oz)   SpO2 95%   BMI 27.03 kg/m    Body mass index is 27.03 kg/m .  Wt Readings from Last 3 Encounters:   07/27/25 78.3 kg (172 lb 9.6 oz)   07/23/25 81.6 kg (180 lb)   02/27/25 82.4 kg (181 lb 9.6 oz)     General " Appearance:   no distress, normal body habitus   ENT/Mouth: membranes moist, no oral lesions or bleeding gums.      EYES:  no scleral icterus, normal conjunctivae   Neck: no carotid bruits or thyromegaly   Chest/Lungs:   lungs are clear to auscultation, no rales or wheezing, equal chest wall expansion    Cardiovascular:   Regular. Normal first and second heart sounds with no murmurs, rubs, or gallops; the carotid, radial and posterior tibial pulses are intact, no JVD or LE edema bilaterally    Abdomen:  no organomegaly, masses, bruits, or tenderness; bowel sounds are present   Extremities: no cyanosis or clubbing   Skin: no xanthelasma, warm.    Neurologic: NAD     Psychiatric: alert and oriented x3, calm     A complete 10 systems ROS was reviewed  And is negative except what is listed in the HPI.          Medical History  Surgical History Family History Social History   Past Medical History:   Diagnosis Date     Coronary artery disease 04/2022    coronary calcification score 44     Hyperlipidemia     Past Surgical History:   Procedure Laterality Date     PLEURAL SCARIFICATION      no family history of premature coronary artery disease Social History     Socioeconomic History     Marital status:      Spouse name: Not on file     Number of children: Not on file     Years of education: Not on file     Highest education level: Not on file   Occupational History     Not on file   Tobacco Use     Smoking status: Never     Smokeless tobacco: Never   Substance and Sexual Activity     Alcohol use: Yes     Comment: Alcoholic Drinks/day: Occasional     Drug use: No     Sexual activity: Not on file   Other Topics Concern     Not on file   Social History Narrative     Not on file     Social Drivers of Health     Financial Resource Strain: Low Risk  (7/26/2025)    Financial Resource Strain      Within the past 12 months, have you or your family members you live with been unable to get utilities (heat, electricity) when it  "was really needed?: No   Food Insecurity: Low Risk  (7/26/2025)    Food Insecurity      Within the past 12 months, did you worry that your food would run out before you got money to buy more?: No      Within the past 12 months, did the food you bought just not last and you didn t have money to get more?: No   Transportation Needs: Low Risk  (7/26/2025)    Transportation Needs      Within the past 12 months, has lack of transportation kept you from medical appointments, getting your medicines, non-medical meetings or appointments, work, or from getting things that you need?: No   Physical Activity: Not on file   Stress: Not on file   Social Connections: Unknown (1/1/2022)    Received from [employer] [facility]    Social Connections      Frequency of Communication with Friends and Family: Not on file   Interpersonal Safety: Low Risk  (7/25/2025)    Interpersonal Safety      Do you feel physically and emotionally safe where you currently live?: Yes      Within the past 12 months, have you been hit, slapped, kicked or otherwise physically hurt by someone?: No      Within the past 12 months, have you been humiliated or emotionally abused in other ways by your partner or ex-partner?: No   Housing Stability: Low Risk  (7/26/2025)    Housing Stability      Do you have housing? : Yes      Are you worried about losing your housing?: No           Lab Results    Chemistry/lipid CBC Cardiac Enzymes/BNP/TSH/INR   Lab Results   Component Value Date    CHOL 106 07/25/2025    HDL 47 07/25/2025    TRIG 79 07/25/2025    BUN 9.3 07/26/2025     07/26/2025    CO2 27 07/26/2025    Lab Results   Component Value Date    WBC 5.9 07/25/2025    HGB 14.8 07/26/2025    HCT 45.9 07/25/2025    MCV 87 07/26/2025     07/25/2025    No results found for: \"CKTOTAL\", \"CKMB\", \"TROPONINI\", \"BNP\", \"TSH\", \"INR\"  No results found for: \"CKTOTAL\", \"CKMB\", \"TROPONINI\"       Weight:    Wt Readings from Last 3 Encounters: "   07/27/25 78.3 kg (172 lb 9.6 oz)   07/23/25 81.6 kg (180 lb)   02/27/25 82.4 kg (181 lb 9.6 oz)       Allergies  Allergies   Allergen Reactions     Pollen Extract          Surgical History  Past Surgical History:   Procedure Laterality Date     PLEURAL SCARIFICATION         Social History  Tobacco:   History   Smoking Status     Never   Smokeless Tobacco     Never    Alcohol:   Social History    Substance and Sexual Activity      Alcohol use: Yes        Comment: Alcoholic Drinks/day: Occasional   Illicit Drugs:   History   Drug Use No       Family History  Family History   Problem Relation Age of Onset     Coronary Artery Disease Sister      Coronary Artery Disease Brother      Acute Myocardial Infarction Brother 58.00     Coronary Artery Disease Sister      Coronary Artery Disease Brother      Heart Failure Mother      Heart Failure Father           Annie Tamez MD on 7/27/2025      cc: No Ref-Primary, Physician

## 2025-07-27 NOTE — PROGRESS NOTES
"[facility]    Medicine Progress Note - Hospitalist Service    Date of Admission:  7/25/2025    Assessment & Plan   Hung Newell is a 55 year old male admitted on 7/25/2025.     #CAD, involving left main coronary artery.  History of elevated calcium score 44 in 2022.  #Unstable angina, exertional chest pain, s/p coronary angiogram 7/25 showed severe left main disease.  - Cardiothoracic surgery consult: CABG planned for 7/29 with Dr. Leiva  - On aspirin, Crestor, Zetia.    #HLD.  On rosuvastatin 40 mg, Zetia 10 mg.  Check a.m. lipids.    #Prediabetes.  A1c 5.7.  D/W lifestyle modifications.  RD consulted.        Diet: Regular Diet Adult    DVT Prophylaxis: Pneumatic Compression Devices  Quinn Catheter: Not present  Lines: None     Cardiac Monitoring: ACTIVE order. Indication: Chest pain/ ACS rule out (24 hours)  Code Status: Full Code      Clinically Significant Risk Factors                              # Overweight: Estimated body mass index is 27.03 kg/m  as calculated from the following:    Height as of this encounter: 1.702 m (5' 7\").    Weight as of this encounter: 78.3 kg (172 lb 9.6 oz)., PRESENT ON ADMISSION            Social Drivers of Health     Received from [employer] [facility]    Social Connections          Disposition Plan     Medically Ready for Discharge: Anticipated in 2-4 Days             Allison Frazier MD  Hospitalist Service  [facility]  Securely message with [employer] (more info)  Text page via [facility] Paging/Directory   ______________________________________________________________________    Interval History   New to me today.  Wife at bedside.  Time and date set for CABG.  He was having a difficult time with EMS information initially but is doing better today.  Is not having any acute complaints.  Denies shortness of breath, chest pain, and palpitations discussed his history of palpitations in the past.  Discussed " his family history and risk of his children.  One of his children is getting a stress test soon due to cardiac symptoms.    Physical Exam   Vital Signs: Temp: 98.2  F (36.8  C) Temp src: Oral BP: 115/73 Pulse: 76   Resp: 18 SpO2: 96 % O2 Device: None (Room air)    Weight: 172 lbs 9.6 oz    General Appearance: Awake, alert, in no acute distress  Respiratory: CTAB, no wheeze  Cardiovascular: RRR, no murmur noted  GI: soft, nontender, non distended, normal bowel sounds  Skin: no jaundice, no rash      Medical Decision Making       45 MINUTES SPENT BY ME on the date of service doing chart review, history, exam, documentation & further activities per the note.      Data         Imaging results reviewed over the past 24 hrs:   Recent Results (from the past 24 hours)   US Lower Extremity Venous Mapping Bilateral    Narrative    EXAM: US LOWER EXTREMITY VENOUS MAPPING BILATERAL  LOCATION: [facility]  DATE: 7/26/2025    INDICATION: CABG w u h o vein stripping. Greater and lesser saphenous veins please  COMPARISON: None.  TECHNIQUE: Ultrasound examination of the lower extremity veins was performed, including gray-scale and compression imaging.     LOWER  EXTREMITY FINDINGS:       VENOUS DIAMETERS  RIGHT GREAT SAPHENOUS VEIN  Upper Thigh: 5.0 mm  Mid Thigh: 2.4 mm  Lower Thigh: NV   Knee: NV   Upper Calf: NV   Mid Calf: NV   Lower Calf: NV   Ankle: NV     LEFT GREAT SAPHENOUS VEIN  Upper Thigh: 5.5 mm  Mid Thigh: NV mm  Lower Thigh: 2.3 mm  Knee: NV   Upper Calf: NV   Mid Calf: NV   Lower Calf: Noncompressible   Ankle: Partially compressible     RIGHT SMALL SAPHENOUS VEIN  Upper Calf: 2.7 mm  Mid Calf: 2.1 mm  Lower Calf: 2.7 mm  Ankle: 3.0 mm    LEFT SMALL SAPHENOUS VEIN  Upper Calf: 4.8 mm  Mid Calf: 2.4 mm  Lower Calf: Partially compressible   Ankle: Partially compressible       Impression    IMPRESSION:  1. Bilateral lower extremity venous mapping.

## 2025-07-27 NOTE — PLAN OF CARE
Problem: Adult Inpatient Plan of Care  Goal: Plan of Care Review  Description: The Plan of Care Review/Shift note should be completed every shift.  The Outcome Evaluation is a brief statement about your assessment that the patient is improving, declining, or no change.  This information will be displayed automatically on your shift  note.  Outcome: Progressing   Goal Outcome Evaluation:         Pt A&OX4, up indep in room. Denies chest pain, SOB or other anginal sx. Appetite good, voiding well. Had BM, took shower, practicing sternal precautions and IS. Wife present at bedside. Heart surgery education reinforced. POC ongoing.

## 2025-07-28 ENCOUNTER — PREP FOR PROCEDURE (OUTPATIENT)
Dept: ADMINISTRATIVE | Facility: CLINIC | Age: 55
End: 2025-07-28
Payer: COMMERCIAL

## 2025-07-28 ENCOUNTER — ANESTHESIA EVENT (OUTPATIENT)
Dept: SURGERY | Facility: HOSPITAL | Age: 55
End: 2025-07-28
Payer: COMMERCIAL

## 2025-07-28 DIAGNOSIS — I25.10 CAD (CORONARY ARTERY DISEASE): Primary | ICD-10-CM

## 2025-07-28 LAB
ABO + RH BLD: NORMAL
ALBUMIN UR-MCNC: NEGATIVE MG/DL
APPEARANCE UR: CLEAR
APTT PPP: 29 SECONDS (ref 22–38)
BACTERIA #/AREA URNS HPF: ABNORMAL /HPF
BILIRUB UR QL STRIP: NEGATIVE
BLD GP AB SCN SERPL QL: NEGATIVE
BLD PROD TYP BPU: NORMAL
BLD PROD TYP BPU: NORMAL
BLOOD COMPONENT TYPE: NORMAL
BLOOD COMPONENT TYPE: NORMAL
CODING SYSTEM: NORMAL
CODING SYSTEM: NORMAL
COLOR UR AUTO: ABNORMAL
CROSSMATCH: NORMAL
CROSSMATCH: NORMAL
GLUCOSE UR STRIP-MCNC: NEGATIVE MG/DL
HGB UR QL STRIP: NEGATIVE
HOLD SPECIMEN: NORMAL
INR PPP: 0.93 (ref 0.85–1.15)
ISSUE DATE AND TIME: NORMAL
ISSUE DATE AND TIME: NORMAL
KETONES UR STRIP-MCNC: NEGATIVE MG/DL
LEUKOCYTE ESTERASE UR QL STRIP: NEGATIVE
NITRATE UR QL: NEGATIVE
PH UR STRIP: 6.5 [PH] (ref 5–7)
PREALB SERPL-MCNC: 22.6 MG/DL (ref 20–40)
PROTHROMBIN TIME: 12.7 SECONDS (ref 11.8–14.8)
RBC URINE: 1 /HPF
SP GR UR STRIP: 1.01 (ref 1–1.03)
SPECIMEN EXP DATE BLD: NORMAL
UNIT ABO/RH: NORMAL
UNIT ABO/RH: NORMAL
UNIT NUMBER: NORMAL
UNIT NUMBER: NORMAL
UNIT STATUS: NORMAL
UNIT STATUS: NORMAL
UNIT TYPE ISBT: 5100
UNIT TYPE ISBT: 5100
UROBILINOGEN UR STRIP-MCNC: NORMAL MG/DL
WBC URINE: <1 /HPF

## 2025-07-28 PROCEDURE — 99232 SBSQ HOSP IP/OBS MODERATE 35: CPT | Performed by: INTERNAL MEDICINE

## 2025-07-28 PROCEDURE — 86901 BLOOD TYPING SEROLOGIC RH(D): CPT

## 2025-07-28 PROCEDURE — 86900 BLOOD TYPING SEROLOGIC ABO: CPT

## 2025-07-28 PROCEDURE — 210N000001 HC R&B IMCU HEART CARE

## 2025-07-28 PROCEDURE — 36415 COLL VENOUS BLD VENIPUNCTURE: CPT

## 2025-07-28 PROCEDURE — 84134 ASSAY OF PREALBUMIN: CPT | Performed by: THORACIC SURGERY (CARDIOTHORACIC VASCULAR SURGERY)

## 2025-07-28 PROCEDURE — 85610 PROTHROMBIN TIME: CPT

## 2025-07-28 PROCEDURE — 250N000013 HC RX MED GY IP 250 OP 250 PS 637: Performed by: INTERNAL MEDICINE

## 2025-07-28 PROCEDURE — 99233 SBSQ HOSP IP/OBS HIGH 50: CPT | Performed by: INTERNAL MEDICINE

## 2025-07-28 PROCEDURE — 86923 COMPATIBILITY TEST ELECTRIC: CPT

## 2025-07-28 PROCEDURE — 81001 URINALYSIS AUTO W/SCOPE: CPT

## 2025-07-28 PROCEDURE — 85730 THROMBOPLASTIN TIME PARTIAL: CPT | Performed by: THORACIC SURGERY (CARDIOTHORACIC VASCULAR SURGERY)

## 2025-07-28 RX ORDER — NICOTINE POLACRILEX 4 MG
15-30 LOZENGE BUCCAL
Status: CANCELLED | OUTPATIENT
Start: 2025-07-28

## 2025-07-28 RX ORDER — DEXTROSE MONOHYDRATE 25 G/50ML
25-50 INJECTION, SOLUTION INTRAVENOUS
Status: CANCELLED | OUTPATIENT
Start: 2025-07-28

## 2025-07-28 RX ORDER — METOPROLOL SUCCINATE 25 MG/1
25 TABLET, EXTENDED RELEASE ORAL ONCE
Status: COMPLETED | OUTPATIENT
Start: 2025-07-28 | End: 2025-07-28

## 2025-07-28 RX ORDER — CALCIUM GLUCONATE 20 MG/ML
2 INJECTION, SOLUTION INTRAVENOUS EVERY 6 HOURS PRN
Status: CANCELLED | OUTPATIENT
Start: 2025-07-28 | End: 2025-08-03

## 2025-07-28 RX ORDER — LIDOCAINE 40 MG/G
CREAM TOPICAL
Status: CANCELLED | OUTPATIENT
Start: 2025-07-28

## 2025-07-28 RX ORDER — PANTOPRAZOLE SODIUM 40 MG/1
40 TABLET, DELAYED RELEASE ORAL DAILY
Status: CANCELLED | OUTPATIENT
Start: 2025-07-28

## 2025-07-28 RX ORDER — DEXMEDETOMIDINE HYDROCHLORIDE 4 UG/ML
.2-.7 INJECTION, SOLUTION INTRAVENOUS CONTINUOUS
Status: CANCELLED | OUTPATIENT
Start: 2025-07-28

## 2025-07-28 RX ORDER — CALCIUM GLUCONATE 20 MG/ML
1 INJECTION, SOLUTION INTRAVENOUS EVERY 6 HOURS PRN
Status: CANCELLED | OUTPATIENT
Start: 2025-07-28 | End: 2025-08-03

## 2025-07-28 RX ORDER — HYDRALAZINE HYDROCHLORIDE 20 MG/ML
10 INJECTION INTRAMUSCULAR; INTRAVENOUS EVERY 30 MIN PRN
Status: CANCELLED | OUTPATIENT
Start: 2025-07-28

## 2025-07-28 RX ORDER — METOPROLOL SUCCINATE 50 MG/1
50 TABLET, EXTENDED RELEASE ORAL DAILY
Status: DISCONTINUED | OUTPATIENT
Start: 2025-07-29 | End: 2025-07-29

## 2025-07-28 RX ORDER — HEPARIN SODIUM 5000 [USP'U]/.5ML
5000 INJECTION, SOLUTION INTRAVENOUS; SUBCUTANEOUS EVERY 8 HOURS
Status: CANCELLED | OUTPATIENT
Start: 2025-07-29

## 2025-07-28 RX ADMIN — METOPROLOL SUCCINATE 25 MG: 25 TABLET, EXTENDED RELEASE ORAL at 12:27

## 2025-07-28 RX ADMIN — Medication 3 MG: at 21:04

## 2025-07-28 RX ADMIN — METOPROLOL SUCCINATE 25 MG: 25 TABLET, FILM COATED, EXTENDED RELEASE ORAL at 08:21

## 2025-07-28 RX ADMIN — ROSUVASTATIN 40 MG: 40 TABLET, FILM COATED ORAL at 08:21

## 2025-07-28 RX ADMIN — ASPIRIN 81 MG CHEWABLE TABLET 81 MG: 81 TABLET CHEWABLE at 08:21

## 2025-07-28 RX ADMIN — LORAZEPAM 0.5 MG: 0.5 TABLET ORAL at 21:04

## 2025-07-28 RX ADMIN — EZETIMIBE 10 MG: 10 TABLET ORAL at 08:21

## 2025-07-28 ASSESSMENT — ACTIVITIES OF DAILY LIVING (ADL)
ADLS_ACUITY_SCORE: 22

## 2025-07-28 NOTE — PROGRESS NOTES
"[facility]    Medicine Progress Note - Hospitalist Service    Date of Admission:  7/25/2025    Assessment & Plan   Hung Newell is a 55 year old male with PMHx significant for angina who was admitted on 7/25/2025. He will undergo CABG 7/29.    CAD, involving left main coronary artery.    History of elevated calcium score 44 in 2022.  Unstable angina  - exertional chest pain, s/p coronary angiogram 7/25 showed severe left main disease.  - Cardiothoracic surgery consult: CABG planned for 7/29 with Dr. Leiva  - On aspirin, Crestor, Zetia    HLD  -On rosuvastatin 40 mg, Zetia 10 mg    Prediabetes- A1c 5.7  - D/W lifestyle modifications.    - RD consulted.        Diet: Regular Diet Adult    DVT Prophylaxis: Pneumatic Compression Devices  Quinn Catheter: Not present  Lines: None     Cardiac Monitoring: ACTIVE order. Indication: Chest pain/ ACS rule out (24 hours)  Code Status: Full Code      Clinically Significant Risk Factors                              # Overweight: Estimated body mass index is 27.03 kg/m  as calculated from the following:    Height as of this encounter: 1.702 m (5' 7\").    Weight as of this encounter: 78.3 kg (172 lb 9.6 oz)., PRESENT ON ADMISSION            Social Drivers of Health     Received from [employer] [facility]    Social Connections          Disposition Plan     Medically Ready for Discharge: Anticipated in 2-4 Days             Allison Frazier MD  Hospitalist Service  [facility]  Securely message with [employer] (more info)  Text page via [employer] Paging/Directory   ______________________________________________________________________    Interval History   Doing well. Some anxiety related to surgery tomorrow. No acute complaints.     Physical Exam   Vital Signs: Temp: 98.3  F (36.8  C) Temp src: Oral BP: 113/62 Pulse: 73   Resp: 16 SpO2: 94 % O2 Device: None (Room air)    Weight: 172 lbs 9.6 oz    General " Appearance: Awake, alert, in no acute distress  Respiratory: CTAB, no wheeze  Cardiovascular: RRR, no murmur noted  GI: soft, nontender, non distended, normal bowel sounds  Skin: no jaundice, no rash      Medical Decision Making       30 MINUTES SPENT BY ME on the date of service doing chart review, history, exam, documentation & further activities per the note.      Data     I have personally reviewed the following data over the past 24 hrs:    INR:  0.93 PTT:  29   D-dimer:  N/A Fibrinogen:  N/A       Imaging results reviewed over the past 24 hrs:   No results found for this or any previous visit (from the past 24 hours).

## 2025-07-28 NOTE — PLAN OF CARE
"Goal Outcome Evaluation:    Pt alert and oriented. Denied SOB and chest pain. VSS.  Pt independent in room, make needs known.  Ipad education given, questions encouraged and answered as possible.  Plan for CABG procedure tomorrow at 0700H.  Will cont to monitor.    Problem: Adult Inpatient Plan of Care  Goal: Plan of Care Review  Description: The Plan of Care Review/Shift note should be completed every shift.  The Outcome Evaluation is a brief statement about your assessment that the patient is improving, declining, or no change.  This information will be displayed automatically on your shift  note.  Outcome: Progressing  Goal: Patient-Specific Goal (Individualized)  Description: You can add care plan individualizations to a care plan. Examples of Individualization might be:  \"Parent requests to be called daily at 9am for status\", \"I have a hard time hearing out of my right ear\", or \"Do not touch me to wake me up as it startles  me\".  Outcome: Progressing  Goal: Absence of Hospital-Acquired Illness or Injury  Outcome: Progressing  Intervention: Identify and Manage Fall Risk  Recent Flowsheet Documentation  Taken 7/28/2025 0815 by Romario Bowman, RN  Safety Promotion/Fall Prevention:   safety round/check completed   room door open   patient and family education   nonskid shoes/slippers when out of bed   lighting adjusted   increase visualization of patient  Intervention: Prevent Skin Injury  Recent Flowsheet Documentation  Taken 7/28/2025 1230 by Romario Bowman, RN  Body Position:   sitting up in bed   position changed independently  Taken 7/28/2025 0815 by Romario Bowman, RN  Body Position: position changed independently  Goal: Optimal Comfort and Wellbeing  Outcome: Progressing  Goal: Readiness for Transition of Care  Outcome: Progressing     "

## 2025-07-28 NOTE — PROGRESS NOTES
"  HEART CARE NOTE          Assessment/Recommendations     1.  Severe multi-vessel CAD  Assessment / Plan  Patient continues to denies chest pain or anginal equivalents; continue aggressive risk factor modifications  Patient is high risk fro adverse cardiac events 2/2 CAD, HTN, HLP  CTS team consulted - Please contact their team directly with any questions or concerns regarding OHS     2. HTN  Assessment / Plan  Adequate control; Management per primary team     3. HLP  Assessment / Plan  Currently on high intensity rosuvastatin    Plan of care discussed on July 28, 2025 with patient and family at bedside; plan communicated to primary team overseeing patient's care via chart    History of Present Illness/Subjective    Mr. Hung Newell is a 55 year old male with a PMHx significant for (per Epic notation) HLD, premature CAD, with coronary calcium score of 44, who presented to the clinic on 7/25 for evaluation of exertional chest pain.      Today, Mr. Andino denies acute cardiac events or complaints; Management plan as detailed above     ECG: Personally reviewed. normal sinus rhythm, nonspecific ST and T waves changes, occasional PVC noted, unifocal.     ECHO (personnaly Reviewed on 7/26/25):   The visual ejection fraction is 60-65%.  No regional wall motion abnormalities noted.  The right ventricle is normal in size and function.  No significant valve disease.    Telemetry: personally reviewed July 28, 2025; notable for sinus, occasional PVC     Medical history and pertinent documents reviewed in Care Everywhere please where applicable see details above        Physical Examination Review of Systems   /73 (BP Location: Right arm)   Pulse 76   Temp 98.6  F (37  C) (Oral)   Resp 16   Ht 1.702 m (5' 7\")   Wt 78.3 kg (172 lb 9.6 oz)   SpO2 94%   BMI 27.03 kg/m    Body mass index is 27.03 kg/m .  Wt Readings from Last 3 Encounters:   07/27/25 78.3 kg (172 lb 9.6 oz)   07/23/25 81.6 kg (180 lb)   02/27/25 " 82.4 kg (181 lb 9.6 oz)     General Appearance:   no distress, normal body habitus   ENT/Mouth: membranes moist, no oral lesions or bleeding gums.      EYES:  no scleral icterus, normal conjunctivae   Neck: no carotid bruits or thyromegaly   Chest/Lungs:   lungs are clear to auscultation, no rales or wheezing, equal chest wall expansion    Cardiovascular:   Regular. Normal first and second heart sounds with no murmurs, rubs, or gallops; the carotid, radial and posterior tibial pulses are intact, no JVD or LE edema bilaterally    Abdomen:  no organomegaly, masses, bruits, or tenderness; bowel sounds are present   Extremities: no cyanosis or clubbing   Skin: no xanthelasma, warm.    Neurologic: NAD     Psychiatric: alert and oriented x3, calm     A complete 10 systems ROS was reviewed  And is negative except what is listed in the HPI.          Medical History  Surgical History Family History Social History   Past Medical History:   Diagnosis Date     Coronary artery disease 04/2022    coronary calcification score 44     Hyperlipidemia     Past Surgical History:   Procedure Laterality Date     PLEURAL SCARIFICATION      no family history of premature coronary artery disease Social History     Socioeconomic History     Marital status:      Spouse name: Not on file     Number of children: Not on file     Years of education: Not on file     Highest education level: Not on file   Occupational History     Not on file   Tobacco Use     Smoking status: Never     Smokeless tobacco: Never   Substance and Sexual Activity     Alcohol use: Yes     Comment: Alcoholic Drinks/day: Occasional     Drug use: No     Sexual activity: Not on file   Other Topics Concern     Not on file   Social History Narrative     Not on file     Social Drivers of Health     Financial Resource Strain: Low Risk  (7/26/2025)    Financial Resource Strain      Within the past 12 months, have you or your family members you live with been unable to get  "utilities (heat, electricity) when it was really needed?: No   Food Insecurity: Low Risk  (7/26/2025)    Food Insecurity      Within the past 12 months, did you worry that your food would run out before you got money to buy more?: No      Within the past 12 months, did the food you bought just not last and you didn t have money to get more?: No   Transportation Needs: Low Risk  (7/26/2025)    Transportation Needs      Within the past 12 months, has lack of transportation kept you from medical appointments, getting your medicines, non-medical meetings or appointments, work, or from getting things that you need?: No   Physical Activity: Not on file   Stress: Not on file   Social Connections: Unknown (1/1/2022)    Received from [employer] [facility]    Social Connections      Frequency of Communication with Friends and Family: Not on file   Interpersonal Safety: Low Risk  (7/25/2025)    Interpersonal Safety      Do you feel physically and emotionally safe where you currently live?: Yes      Within the past 12 months, have you been hit, slapped, kicked or otherwise physically hurt by someone?: No      Within the past 12 months, have you been humiliated or emotionally abused in other ways by your partner or ex-partner?: No   Housing Stability: Low Risk  (7/26/2025)    Housing Stability      Do you have housing? : Yes      Are you worried about losing your housing?: No           Lab Results    Chemistry/lipid CBC Cardiac Enzymes/BNP/TSH/INR   Lab Results   Component Value Date    CHOL 106 07/25/2025    HDL 47 07/25/2025    TRIG 79 07/25/2025    BUN 9.3 07/26/2025     07/26/2025    CO2 27 07/26/2025    Lab Results   Component Value Date    WBC 5.9 07/25/2025    HGB 14.8 07/26/2025    HCT 45.9 07/25/2025    MCV 87 07/26/2025     07/25/2025    No results found for: \"CKTOTAL\", \"CKMB\", \"TROPONINI\", \"BNP\", \"TSH\", \"INR\"  No results found for: \"CKTOTAL\", \"CKMB\", \"TROPONINI\"       Weight:    Wt " Readings from Last 3 Encounters:   07/27/25 78.3 kg (172 lb 9.6 oz)   07/23/25 81.6 kg (180 lb)   02/27/25 82.4 kg (181 lb 9.6 oz)       Allergies  Allergies   Allergen Reactions     Pollen Extract          Surgical History  Past Surgical History:   Procedure Laterality Date     PLEURAL SCARIFICATION         Social History  Tobacco:   History   Smoking Status     Never   Smokeless Tobacco     Never    Alcohol:   Social History    Substance and Sexual Activity      Alcohol use: Yes        Comment: Alcoholic Drinks/day: Occasional   Illicit Drugs:   History   Drug Use No       Family History  Family History   Problem Relation Age of Onset     Coronary Artery Disease Sister      Coronary Artery Disease Brother      Acute Myocardial Infarction Brother 58.00     Coronary Artery Disease Sister      Coronary Artery Disease Brother      Heart Failure Mother      Heart Failure Father           Annie Tamez MD on 7/28/2025      cc: No Ref-Primary, Physician

## 2025-07-28 NOTE — PLAN OF CARE
Problem: Adult Inpatient Plan of Care  Goal: Plan of Care Review  Description: The Plan of Care Review/Shift note should be completed every shift.  The Outcome Evaluation is a brief statement about your assessment that the patient is improving, declining, or no change.  This information will be displayed automatically on your shift  note.  Outcome: Progressing     Problem: Adult Inpatient Plan of Care  Goal: Optimal Comfort and Wellbeing  Outcome: Progressing     Problem: Chest Pain  Goal: Resolution of Chest Pain Symptoms  Outcome: Progressing     Goal Outcome Evaluation:    Pt calm and pleasant overnight.  No chest pain or shortness of breath.  SB on tele monitor. Plan for CABG Tuesday 7/29  Vitals stable  Vitals:    07/27/25 1537 07/27/25 1900 07/27/25 2340 07/28/25 0456   BP: 110/66 123/75  108/73   BP Location: Right arm Right arm  Right arm   Pulse:  65  76   Resp: 18 18 18 16   Temp: 98.6  F (37  C) 98.6  F (37  C) 98  F (36.7  C) 98.6  F (37  C)   TempSrc: Oral Oral Oral Oral   SpO2: 92% 95% 96% 94%   Weight:       Height:

## 2025-07-29 ENCOUNTER — APPOINTMENT (OUTPATIENT)
Dept: RADIOLOGY | Facility: HOSPITAL | Age: 55
DRG: 234 | End: 2025-07-29
Payer: COMMERCIAL

## 2025-07-29 ENCOUNTER — ANESTHESIA (OUTPATIENT)
Dept: SURGERY | Facility: HOSPITAL | Age: 55
End: 2025-07-29
Payer: COMMERCIAL

## 2025-07-29 DIAGNOSIS — Z95.1 S/P CABG (CORONARY ARTERY BYPASS GRAFT): Primary | ICD-10-CM

## 2025-07-29 PROBLEM — I25.10 CAD IN NATIVE ARTERY: Status: ACTIVE | Noted: 2025-07-29

## 2025-07-29 LAB
ALBUMIN SERPL BCG-MCNC: 3.8 G/DL (ref 3.5–5.2)
ALP SERPL-CCNC: 44 U/L (ref 40–150)
ALT SERPL W P-5'-P-CCNC: 27 U/L (ref 0–70)
ANION GAP SERPL CALCULATED.3IONS-SCNC: 11 MMOL/L (ref 7–15)
ANION GAP SERPL CALCULATED.3IONS-SCNC: 15 MMOL/L (ref 7–15)
APTT PPP: 29 SECONDS (ref 22–38)
APTT PPP: 30 SECONDS (ref 22–38)
APTT PPP: 33 SECONDS (ref 22–38)
AST SERPL W P-5'-P-CCNC: 31 U/L (ref 0–45)
ATRIAL RATE - MUSE: 82 BPM
BASE EXCESS BLDA CALC-SCNC: -0.2 MMOL/L (ref -3–3)
BASE EXCESS BLDA CALC-SCNC: -2.2 MMOL/L (ref -3–3)
BASE EXCESS BLDA CALC-SCNC: -2.7 MMOL/L (ref -3–3)
BASE EXCESS BLDA CALC-SCNC: -3 MMOL/L (ref -3–3)
BASE EXCESS BLDA CALC-SCNC: -3.5 MMOL/L (ref -3–3)
BASE EXCESS BLDA CALC-SCNC: -5 MMOL/L (ref -3–3)
BASE EXCESS BLDV CALC-SCNC: 0.5 MMOL/L (ref -3–3)
BILIRUB SERPL-MCNC: 0.9 MG/DL
BUN SERPL-MCNC: 10.9 MG/DL (ref 6–20)
BUN SERPL-MCNC: 8.2 MG/DL (ref 6–20)
CA-I BLD-MCNC: 3.6 MG/DL (ref 4.4–5.2)
CA-I BLD-MCNC: 3.9 MG/DL (ref 4.4–5.2)
CA-I BLD-MCNC: 4.1 MG/DL (ref 4.4–5.2)
CA-I BLD-MCNC: 4.5 MG/DL (ref 4.4–5.2)
CA-I BLD-MCNC: 4.5 MG/DL (ref 4.4–5.2)
CA-I BLD-MCNC: 5.2 MG/DL (ref 4.4–5.2)
CALCIUM SERPL-MCNC: 8.7 MG/DL (ref 8.8–10.4)
CALCIUM SERPL-MCNC: 9.4 MG/DL (ref 8.8–10.4)
CHLORIDE SERPL-SCNC: 104 MMOL/L (ref 98–107)
CHLORIDE SERPL-SCNC: 106 MMOL/L (ref 98–107)
CPB POCT: NO
CREAT SERPL-MCNC: 0.88 MG/DL (ref 0.67–1.17)
CREAT SERPL-MCNC: 0.93 MG/DL (ref 0.67–1.17)
DIASTOLIC BLOOD PRESSURE - MUSE: NORMAL MMHG
EGFRCR SERPLBLD CKD-EPI 2021: >90 ML/MIN/1.73M2
EGFRCR SERPLBLD CKD-EPI 2021: >90 ML/MIN/1.73M2
ERYTHROCYTE [DISTWIDTH] IN BLOOD BY AUTOMATED COUNT: 11.6 % (ref 10–15)
ERYTHROCYTE [DISTWIDTH] IN BLOOD BY AUTOMATED COUNT: 11.7 % (ref 10–15)
ERYTHROCYTE [DISTWIDTH] IN BLOOD BY AUTOMATED COUNT: 11.8 % (ref 10–15)
FIBRINOGEN PPP-MCNC: 271 MG/DL (ref 170–510)
FIBRINOGEN PPP-MCNC: 404 MG/DL (ref 170–510)
GLUCOSE BLD-MCNC: 113 MG/DL (ref 70–99)
GLUCOSE BLD-MCNC: 118 MG/DL (ref 70–99)
GLUCOSE BLD-MCNC: 118 MG/DL (ref 70–99)
GLUCOSE BLD-MCNC: 97 MG/DL (ref 70–99)
GLUCOSE BLDC GLUCOMTR-MCNC: 100 MG/DL (ref 70–99)
GLUCOSE BLDC GLUCOMTR-MCNC: 101 MG/DL (ref 70–99)
GLUCOSE BLDC GLUCOMTR-MCNC: 121 MG/DL (ref 70–99)
GLUCOSE BLDC GLUCOMTR-MCNC: 123 MG/DL (ref 70–99)
GLUCOSE BLDC GLUCOMTR-MCNC: 131 MG/DL (ref 70–99)
GLUCOSE BLDC GLUCOMTR-MCNC: 141 MG/DL (ref 70–99)
GLUCOSE BLDC GLUCOMTR-MCNC: 147 MG/DL (ref 70–99)
GLUCOSE BLDC GLUCOMTR-MCNC: 158 MG/DL (ref 70–99)
GLUCOSE BLDC GLUCOMTR-MCNC: 160 MG/DL (ref 70–99)
GLUCOSE BLDC GLUCOMTR-MCNC: 161 MG/DL (ref 70–99)
GLUCOSE SERPL-MCNC: 103 MG/DL (ref 70–99)
GLUCOSE SERPL-MCNC: 161 MG/DL (ref 70–99)
HCO3 BLD-SCNC: 21 MMOL/L (ref 21–28)
HCO3 BLD-SCNC: 21 MMOL/L (ref 21–28)
HCO3 BLDA-SCNC: 19 MMOL/L (ref 21–28)
HCO3 BLDA-SCNC: 22 MMOL/L (ref 21–28)
HCO3 BLDA-SCNC: 23 MMOL/L (ref 21–28)
HCO3 BLDA-SCNC: 24 MMOL/L (ref 21–28)
HCO3 BLDV-SCNC: 25 MMOL/L (ref 21–28)
HCO3 SERPL-SCNC: 21 MMOL/L (ref 22–29)
HCO3 SERPL-SCNC: 25 MMOL/L (ref 22–29)
HCT VFR BLD AUTO: 35.7 % (ref 40–53)
HCT VFR BLD AUTO: 35.7 % (ref 40–53)
HCT VFR BLD AUTO: 45.2 % (ref 40–53)
HCT VFR BLD CALC: 35 % (ref 40–53)
HGB BLD-MCNC: 11.9 G/DL (ref 13.3–17.7)
HGB BLD-MCNC: 12.3 G/DL (ref 13.3–17.7)
HGB BLD-MCNC: 12.3 G/DL (ref 13.3–17.7)
HGB BLD-MCNC: 12.4 G/DL (ref 13.3–17.7)
HGB BLD-MCNC: 12.4 G/DL (ref 13.3–17.7)
HGB BLD-MCNC: 12.6 G/DL (ref 13.3–17.7)
HGB BLD-MCNC: 14.1 G/DL (ref 13.3–17.7)
HGB BLD-MCNC: 15.5 G/DL (ref 13.3–17.7)
INR PPP: 1.31 (ref 0.85–1.15)
INR PPP: 1.31 (ref 0.85–1.15)
INTERPRETATION ECG - MUSE: NORMAL
LACTATE BLD-SCNC: 0.8 MMOL/L (ref 0.7–2)
LACTATE BLD-SCNC: 0.8 MMOL/L (ref 0.7–2)
LACTATE BLD-SCNC: 0.9 MMOL/L (ref 0.7–2)
LACTATE BLD-SCNC: 1.2 MMOL/L (ref 0.7–2)
LACTATE SERPL-SCNC: 1.4 MMOL/L (ref 0.7–2)
LACTATE SERPL-SCNC: 1.9 MMOL/L (ref 0.7–2)
LACTATE SERPL-SCNC: 3.5 MMOL/L (ref 0.7–2)
LACTATE SERPL-SCNC: 4.3 MMOL/L (ref 0.7–2)
MAGNESIUM SERPL-MCNC: 2.3 MG/DL (ref 1.7–2.3)
MCH RBC QN AUTO: 30 PG (ref 26.5–33)
MCH RBC QN AUTO: 30.5 PG (ref 26.5–33)
MCH RBC QN AUTO: 30.5 PG (ref 26.5–33)
MCHC RBC AUTO-ENTMCNC: 34.3 G/DL (ref 31.5–36.5)
MCHC RBC AUTO-ENTMCNC: 34.7 G/DL (ref 31.5–36.5)
MCHC RBC AUTO-ENTMCNC: 34.7 G/DL (ref 31.5–36.5)
MCV RBC AUTO: 87 FL (ref 78–100)
MCV RBC AUTO: 88 FL (ref 78–100)
MCV RBC AUTO: 88 FL (ref 78–100)
O2/TOTAL GAS SETTING VFR VENT: 30 %
O2/TOTAL GAS SETTING VFR VENT: 44 %
OXYHGB MFR BLDA: 96 % (ref 92–100)
OXYHGB MFR BLDA: 98 % (ref 92–100)
OXYHGB MFR BLDA: 98 % (ref 92–100)
OXYHGB MFR BLDA: 99 % (ref 92–100)
OXYHGB MFR BLDA: 99 % (ref 92–100)
OXYHGB MFR BLDV: 88 % (ref 70–75)
P AXIS - MUSE: 62 DEGREES
PCO2 BLD: 34 MM HG (ref 35–45)
PCO2 BLD: 37 MM HG (ref 35–45)
PCO2 BLDA: 32 MM HG (ref 35–45)
PCO2 BLDA: 41 MM HG (ref 35–45)
PCO2 BLDA: 43 MM HG (ref 35–45)
PCO2 BLDA: 45 MM HG (ref 35–45)
PCO2 BLDV: 44 MM HG (ref 40–50)
PEEP: 5 CM H2O
PH BLD: 7.37 [PH] (ref 7.35–7.45)
PH BLD: 7.4 [PH] (ref 7.35–7.45)
PH BLDA: 7.33 [PH] (ref 7.35–7.45)
PH BLDA: 7.35 [PH] (ref 7.35–7.45)
PH BLDA: 7.38 [PH] (ref 7.35–7.45)
PH BLDA: 7.39 [PH] (ref 7.35–7.45)
PH BLDV: 7.38 [PH] (ref 7.32–7.43)
PHOSPHATE SERPL-MCNC: 3.6 MG/DL (ref 2.5–4.5)
PLATELET # BLD AUTO: 139 10E3/UL (ref 150–450)
PLATELET # BLD AUTO: 163 10E3/UL (ref 150–450)
PLATELET # BLD AUTO: 222 10E3/UL (ref 150–450)
PO2 BLD: 131 MM HG (ref 80–105)
PO2 BLD: 84 MM HG (ref 80–105)
PO2 BLDA: 133 MM HG (ref 80–105)
PO2 BLDA: 172 MM HG (ref 80–105)
PO2 BLDA: 209 MM HG (ref 80–105)
PO2 BLDA: 335 MM HG (ref 80–105)
PO2 BLDV: 56 MM HG (ref 25–47)
POTASSIUM BLD-SCNC: 3.5 MMOL/L (ref 3.4–5.3)
POTASSIUM BLD-SCNC: 4.3 MMOL/L (ref 3.4–5.3)
POTASSIUM BLD-SCNC: 4.3 MMOL/L (ref 3.4–5.3)
POTASSIUM BLD-SCNC: 5.2 MMOL/L (ref 3.4–5.3)
POTASSIUM BLD-SCNC: 5.3 MMOL/L (ref 3.4–5.3)
POTASSIUM SERPL-SCNC: 3.8 MMOL/L (ref 3.4–5.3)
POTASSIUM SERPL-SCNC: 3.8 MMOL/L (ref 3.4–5.3)
POTASSIUM SERPL-SCNC: 4.1 MMOL/L (ref 3.4–5.3)
PR INTERVAL - MUSE: 150 MS
PROT SERPL-MCNC: 5.5 G/DL (ref 6.4–8.3)
PROTHROMBIN TIME: 16.4 SECONDS (ref 11.8–14.8)
PROTHROMBIN TIME: 16.4 SECONDS (ref 11.8–14.8)
QRS DURATION - MUSE: 90 MS
QT - MUSE: 390 MS
QTC - MUSE: 455 MS
R AXIS - MUSE: 63 DEGREES
RBC # BLD AUTO: 4.06 10E6/UL (ref 4.4–5.9)
RBC # BLD AUTO: 4.07 10E6/UL (ref 4.4–5.9)
RBC # BLD AUTO: 5.17 10E6/UL (ref 4.4–5.9)
SAO2 % BLDA: 100 % (ref 96–97)
SAO2 % BLDA: 97.2 % (ref 96–97)
SAO2 % BLDA: 99 % (ref 96–97)
SAO2 % BLDA: 99.5 % (ref 96–97)
SAO2 % BLDV: 90 % (ref 70–75)
SODIUM BLD-SCNC: 137 MMOL/L (ref 135–145)
SODIUM BLD-SCNC: 137 MMOL/L (ref 135–145)
SODIUM BLD-SCNC: 138 MMOL/L (ref 135–145)
SODIUM BLD-SCNC: 139 MMOL/L (ref 135–145)
SODIUM BLD-SCNC: 139 MMOL/L (ref 135–145)
SODIUM SERPL-SCNC: 140 MMOL/L (ref 135–145)
SODIUM SERPL-SCNC: 142 MMOL/L (ref 135–145)
SYSTOLIC BLOOD PRESSURE - MUSE: NORMAL MMHG
T AXIS - MUSE: 19 DEGREES
VENTRICULAR RATE- MUSE: 82 BPM
WBC # BLD AUTO: 14.4 10E3/UL (ref 4–11)
WBC # BLD AUTO: 6.8 10E3/UL (ref 4–11)
WBC # BLD AUTO: 9.9 10E3/UL (ref 4–11)

## 2025-07-29 PROCEDURE — 410N000003 HC PER-PERFUSION 1ST 30 MIN: Performed by: THORACIC SURGERY (CARDIOTHORACIC VASCULAR SURGERY)

## 2025-07-29 PROCEDURE — 258N000003 HC RX IP 258 OP 636

## 2025-07-29 PROCEDURE — 85014 HEMATOCRIT: CPT | Performed by: INTERNAL MEDICINE

## 2025-07-29 PROCEDURE — 410N000004: Performed by: THORACIC SURGERY (CARDIOTHORACIC VASCULAR SURGERY)

## 2025-07-29 PROCEDURE — 85384 FIBRINOGEN ACTIVITY: CPT

## 2025-07-29 PROCEDURE — 82947 ASSAY GLUCOSE BLOOD QUANT: CPT

## 2025-07-29 PROCEDURE — 93010 ELECTROCARDIOGRAM REPORT: CPT | Performed by: STUDENT IN AN ORGANIZED HEALTH CARE EDUCATION/TRAINING PROGRAM

## 2025-07-29 PROCEDURE — 85730 THROMBOPLASTIN TIME PARTIAL: CPT | Performed by: NURSE ANESTHETIST, CERTIFIED REGISTERED

## 2025-07-29 PROCEDURE — 93005 ELECTROCARDIOGRAM TRACING: CPT

## 2025-07-29 PROCEDURE — 99223 1ST HOSP IP/OBS HIGH 75: CPT | Performed by: NURSE PRACTITIONER

## 2025-07-29 PROCEDURE — 5A1223Z PERFORMANCE OF CARDIAC PACING, CONTINUOUS: ICD-10-PCS | Performed by: THORACIC SURGERY (CARDIOTHORACIC VASCULAR SURGERY)

## 2025-07-29 PROCEDURE — 85018 HEMOGLOBIN: CPT

## 2025-07-29 PROCEDURE — 258N000003 HC RX IP 258 OP 636: Performed by: THORACIC SURGERY (CARDIOTHORACIC VASCULAR SURGERY)

## 2025-07-29 PROCEDURE — 02HN3JZ INSERTION OF PACEMAKER LEAD INTO PERICARDIUM, PERCUTANEOUS APPROACH: ICD-10-PCS | Performed by: THORACIC SURGERY (CARDIOTHORACIC VASCULAR SURGERY)

## 2025-07-29 PROCEDURE — 250N000009 HC RX 250

## 2025-07-29 PROCEDURE — 02100Z9 BYPASS CORONARY ARTERY, ONE ARTERY FROM LEFT INTERNAL MAMMARY, OPEN APPROACH: ICD-10-PCS | Performed by: THORACIC SURGERY (CARDIOTHORACIC VASCULAR SURGERY)

## 2025-07-29 PROCEDURE — 33534 CABG ARTERIAL TWO: CPT | Performed by: THORACIC SURGERY (CARDIOTHORACIC VASCULAR SURGERY)

## 2025-07-29 PROCEDURE — P9045 ALBUMIN (HUMAN), 5%, 250 ML: HCPCS | Mod: JZ | Performed by: NURSE ANESTHETIST, CERTIFIED REGISTERED

## 2025-07-29 PROCEDURE — 85730 THROMBOPLASTIN TIME PARTIAL: CPT

## 2025-07-29 PROCEDURE — 272N000001 HC OR GENERAL SUPPLY STERILE: Performed by: THORACIC SURGERY (CARDIOTHORACIC VASCULAR SURGERY)

## 2025-07-29 PROCEDURE — 272N000202 HC AEROBIKA WITH MANOMETER

## 2025-07-29 PROCEDURE — 82805 BLOOD GASES W/O2 SATURATION: CPT

## 2025-07-29 PROCEDURE — 03BC4ZZ EXCISION OF LEFT RADIAL ARTERY, PERCUTANEOUS ENDOSCOPIC APPROACH: ICD-10-PCS | Performed by: THORACIC SURGERY (CARDIOTHORACIC VASCULAR SURGERY)

## 2025-07-29 PROCEDURE — 250N000025 HC SEVOFLURANE, PER MIN: Performed by: THORACIC SURGERY (CARDIOTHORACIC VASCULAR SURGERY)

## 2025-07-29 PROCEDURE — 250N000011 HC RX IP 250 OP 636: Performed by: THORACIC SURGERY (CARDIOTHORACIC VASCULAR SURGERY)

## 2025-07-29 PROCEDURE — 250N000013 HC RX MED GY IP 250 OP 250 PS 637: Performed by: INTERNAL MEDICINE

## 2025-07-29 PROCEDURE — 83605 ASSAY OF LACTIC ACID: CPT | Performed by: NURSE PRACTITIONER

## 2025-07-29 PROCEDURE — 272N000004 HC RX 272: Performed by: THORACIC SURGERY (CARDIOTHORACIC VASCULAR SURGERY)

## 2025-07-29 PROCEDURE — 82803 BLOOD GASES ANY COMBINATION: CPT

## 2025-07-29 PROCEDURE — 999N000141 HC STATISTIC PRE-PROCEDURE NURSING ASSESSMENT: Performed by: THORACIC SURGERY (CARDIOTHORACIC VASCULAR SURGERY)

## 2025-07-29 PROCEDURE — P9045 ALBUMIN (HUMAN), 5%, 250 ML: HCPCS | Mod: JZ | Performed by: THORACIC SURGERY (CARDIOTHORACIC VASCULAR SURGERY)

## 2025-07-29 PROCEDURE — 82330 ASSAY OF CALCIUM: CPT

## 2025-07-29 PROCEDURE — 999N000259 HC STATISTIC EXTUBATION

## 2025-07-29 PROCEDURE — 250N000009 HC RX 250: Performed by: THORACIC SURGERY (CARDIOTHORACIC VASCULAR SURGERY)

## 2025-07-29 PROCEDURE — P9016 RBC LEUKOCYTES REDUCED: HCPCS

## 2025-07-29 PROCEDURE — 85610 PROTHROMBIN TIME: CPT | Performed by: NURSE ANESTHETIST, CERTIFIED REGISTERED

## 2025-07-29 PROCEDURE — 85027 COMPLETE CBC AUTOMATED: CPT | Performed by: NURSE ANESTHETIST, CERTIFIED REGISTERED

## 2025-07-29 PROCEDURE — 5A1221Z PERFORMANCE OF CARDIAC OUTPUT, CONTINUOUS: ICD-10-PCS | Performed by: THORACIC SURGERY (CARDIOTHORACIC VASCULAR SURGERY)

## 2025-07-29 PROCEDURE — 80048 BASIC METABOLIC PNL TOTAL CA: CPT | Performed by: INTERNAL MEDICINE

## 2025-07-29 PROCEDURE — 82330 ASSAY OF CALCIUM: CPT | Performed by: THORACIC SURGERY (CARDIOTHORACIC VASCULAR SURGERY)

## 2025-07-29 PROCEDURE — 999N000253 HC STATISTIC WEANING TRIALS

## 2025-07-29 PROCEDURE — 84295 ASSAY OF SERUM SODIUM: CPT

## 2025-07-29 PROCEDURE — 85384 FIBRINOGEN ACTIVITY: CPT | Performed by: NURSE ANESTHETIST, CERTIFIED REGISTERED

## 2025-07-29 PROCEDURE — 250N000011 HC RX IP 250 OP 636

## 2025-07-29 PROCEDURE — 250N000012 HC RX MED GY IP 250 OP 636 PS 637: Performed by: THORACIC SURGERY (CARDIOTHORACIC VASCULAR SURGERY)

## 2025-07-29 PROCEDURE — 33509 NDSC HRV UXTR ART 1 SGM CAB: CPT | Mod: 59 | Performed by: THORACIC SURGERY (CARDIOTHORACIC VASCULAR SURGERY)

## 2025-07-29 PROCEDURE — 36415 COLL VENOUS BLD VENIPUNCTURE: CPT

## 2025-07-29 PROCEDURE — C1898 LEAD, PMKR, OTHER THAN TRANS: HCPCS | Performed by: THORACIC SURGERY (CARDIOTHORACIC VASCULAR SURGERY)

## 2025-07-29 PROCEDURE — 360N000079 HC SURGERY LEVEL 6, PER MIN: Performed by: THORACIC SURGERY (CARDIOTHORACIC VASCULAR SURGERY)

## 2025-07-29 PROCEDURE — 250N000009 HC RX 250: Performed by: NURSE ANESTHETIST, CERTIFIED REGISTERED

## 2025-07-29 PROCEDURE — 83735 ASSAY OF MAGNESIUM: CPT

## 2025-07-29 PROCEDURE — 83605 ASSAY OF LACTIC ACID: CPT

## 2025-07-29 PROCEDURE — 370N000017 HC ANESTHESIA TECHNICAL FEE, PER MIN: Performed by: THORACIC SURGERY (CARDIOTHORACIC VASCULAR SURGERY)

## 2025-07-29 PROCEDURE — 02100AW BYPASS CORONARY ARTERY, ONE ARTERY FROM AORTA WITH AUTOLOGOUS ARTERIAL TISSUE, OPEN APPROACH: ICD-10-PCS | Performed by: THORACIC SURGERY (CARDIOTHORACIC VASCULAR SURGERY)

## 2025-07-29 PROCEDURE — 200N000001 HC R&B ICU

## 2025-07-29 PROCEDURE — 85610 PROTHROMBIN TIME: CPT

## 2025-07-29 PROCEDURE — 94002 VENT MGMT INPAT INIT DAY: CPT

## 2025-07-29 PROCEDURE — 250N000011 HC RX IP 250 OP 636: Performed by: NURSE ANESTHETIST, CERTIFIED REGISTERED

## 2025-07-29 PROCEDURE — 258N000003 HC RX IP 258 OP 636: Performed by: NURSE ANESTHETIST, CERTIFIED REGISTERED

## 2025-07-29 PROCEDURE — 272N000002 HC OR SUPPLY OTHER OPNP: Performed by: THORACIC SURGERY (CARDIOTHORACIC VASCULAR SURGERY)

## 2025-07-29 PROCEDURE — C1760 CLOSURE DEV, VASC: HCPCS | Performed by: THORACIC SURGERY (CARDIOTHORACIC VASCULAR SURGERY)

## 2025-07-29 PROCEDURE — 84100 ASSAY OF PHOSPHORUS: CPT

## 2025-07-29 PROCEDURE — 999N000065 XR CHEST PORT 1 VIEW

## 2025-07-29 PROCEDURE — 84132 ASSAY OF SERUM POTASSIUM: CPT

## 2025-07-29 PROCEDURE — 84520 ASSAY OF UREA NITROGEN: CPT

## 2025-07-29 PROCEDURE — 258N000003 HC RX IP 258 OP 636: Performed by: ANESTHESIOLOGY

## 2025-07-29 PROCEDURE — 250N000013 HC RX MED GY IP 250 OP 250 PS 637

## 2025-07-29 PROCEDURE — 999N000157 HC STATISTIC RCP TIME EA 10 MIN

## 2025-07-29 RX ORDER — NALOXONE HYDROCHLORIDE 1 MG/ML
0.2 INJECTION INTRAMUSCULAR; INTRAVENOUS; SUBCUTANEOUS
Status: ACTIVE | OUTPATIENT
Start: 2025-07-29

## 2025-07-29 RX ORDER — LIDOCAINE 4 G/G
1-2 PATCH TOPICAL EVERY 24 HOURS
Status: DISPENSED | OUTPATIENT
Start: 2025-07-29

## 2025-07-29 RX ORDER — MEPERIDINE HYDROCHLORIDE 25 MG/ML
12.5 INJECTION INTRAMUSCULAR; INTRAVENOUS; SUBCUTANEOUS EVERY 5 MIN PRN
Status: DISCONTINUED | OUTPATIENT
Start: 2025-07-29 | End: 2025-07-29

## 2025-07-29 RX ORDER — CALCIUM GLUCONATE 20 MG/ML
2 INJECTION, SOLUTION INTRAVENOUS
Status: DISPENSED | OUTPATIENT
Start: 2025-07-29 | End: 2025-08-06

## 2025-07-29 RX ORDER — HYDROMORPHONE HCL IN WATER/PF 6 MG/30 ML
0.4 PATIENT CONTROLLED ANALGESIA SYRINGE INTRAVENOUS
Status: ACTIVE | OUTPATIENT
Start: 2025-07-29

## 2025-07-29 RX ORDER — ONDANSETRON 4 MG/1
4 TABLET, ORALLY DISINTEGRATING ORAL EVERY 30 MIN PRN
Status: DISCONTINUED | OUTPATIENT
Start: 2025-07-29 | End: 2025-07-29

## 2025-07-29 RX ORDER — DEXAMETHASONE SODIUM PHOSPHATE 4 MG/ML
4 INJECTION, SOLUTION INTRA-ARTICULAR; INTRALESIONAL; INTRAMUSCULAR; INTRAVENOUS; SOFT TISSUE
Status: DISCONTINUED | OUTPATIENT
Start: 2025-07-29 | End: 2025-07-29

## 2025-07-29 RX ORDER — SODIUM CHLORIDE, SODIUM GLUCONATE, SODIUM ACETATE, POTASSIUM CHLORIDE AND MAGNESIUM CHLORIDE 526; 502; 368; 37; 30 MG/100ML; MG/100ML; MG/100ML; MG/100ML; MG/100ML
1000 INJECTION, SOLUTION INTRAVENOUS
Status: DISCONTINUED | OUTPATIENT
Start: 2025-07-29 | End: 2025-07-29

## 2025-07-29 RX ORDER — LORAZEPAM 2 MG/ML
.5-1 INJECTION INTRAMUSCULAR
Status: DISCONTINUED | OUTPATIENT
Start: 2025-07-29 | End: 2025-07-29

## 2025-07-29 RX ORDER — HALOPERIDOL 5 MG/ML
2 INJECTION INTRAMUSCULAR
Status: DISCONTINUED | OUTPATIENT
Start: 2025-07-29 | End: 2025-07-29

## 2025-07-29 RX ORDER — ACETAMINOPHEN 325 MG/1
975 TABLET ORAL EVERY 8 HOURS SCHEDULED
Status: DISPENSED | OUTPATIENT
Start: 2025-07-29

## 2025-07-29 RX ORDER — PROPOFOL 10 MG/ML
INJECTION, EMULSION INTRAVENOUS PRN
Status: DISCONTINUED | OUTPATIENT
Start: 2025-07-29 | End: 2025-07-29

## 2025-07-29 RX ORDER — PHENYLEPHRINE HCL IN 0.9% NACL 50MG/250ML
.1-4 PLASTIC BAG, INJECTION (ML) INTRAVENOUS CONTINUOUS PRN
Status: DISCONTINUED | OUTPATIENT
Start: 2025-07-29 | End: 2025-07-30

## 2025-07-29 RX ORDER — VASOPRESSIN IN 0.9 % NACL 2 UNIT/2ML
SYRINGE (ML) INTRAVENOUS PRN
Status: DISCONTINUED | OUTPATIENT
Start: 2025-07-29 | End: 2025-07-29

## 2025-07-29 RX ORDER — SODIUM CHLORIDE, SODIUM LACTATE, POTASSIUM CHLORIDE, CALCIUM CHLORIDE 600; 310; 30; 20 MG/100ML; MG/100ML; MG/100ML; MG/100ML
INJECTION, SOLUTION INTRAVENOUS CONTINUOUS
Status: DISCONTINUED | OUTPATIENT
Start: 2025-07-29 | End: 2025-07-30

## 2025-07-29 RX ORDER — GLYCOPYRROLATE 0.2 MG/ML
INJECTION, SOLUTION INTRAMUSCULAR; INTRAVENOUS PRN
Status: DISCONTINUED | OUTPATIENT
Start: 2025-07-29 | End: 2025-07-29

## 2025-07-29 RX ORDER — CEFAZOLIN SODIUM/WATER 2 G/20 ML
2 SYRINGE (ML) INTRAVENOUS SEE ADMIN INSTRUCTIONS
Status: DISCONTINUED | OUTPATIENT
Start: 2025-07-29 | End: 2025-07-29 | Stop reason: HOSPADM

## 2025-07-29 RX ORDER — ALBUTEROL SULFATE 0.83 MG/ML
2.5 SOLUTION RESPIRATORY (INHALATION) EVERY 4 HOURS PRN
Status: DISCONTINUED | OUTPATIENT
Start: 2025-07-29 | End: 2025-07-29

## 2025-07-29 RX ORDER — ASPIRIN 81 MG/1
81 TABLET, CHEWABLE ORAL
Status: COMPLETED | OUTPATIENT
Start: 2025-07-29 | End: 2025-07-29

## 2025-07-29 RX ORDER — ONDANSETRON 2 MG/ML
4 INJECTION INTRAMUSCULAR; INTRAVENOUS EVERY 30 MIN PRN
Status: DISCONTINUED | OUTPATIENT
Start: 2025-07-29 | End: 2025-07-29

## 2025-07-29 RX ORDER — METHADONE HYDROCHLORIDE 10 MG/ML
INJECTION, SOLUTION INTRAMUSCULAR; INTRAVENOUS; SUBCUTANEOUS PRN
Status: DISCONTINUED | OUTPATIENT
Start: 2025-07-29 | End: 2025-07-29

## 2025-07-29 RX ORDER — SODIUM CHLORIDE, SODIUM LACTATE, POTASSIUM CHLORIDE, CALCIUM CHLORIDE 600; 310; 30; 20 MG/100ML; MG/100ML; MG/100ML; MG/100ML
INJECTION, SOLUTION INTRAVENOUS CONTINUOUS
Status: DISCONTINUED | OUTPATIENT
Start: 2025-07-29 | End: 2025-07-29 | Stop reason: HOSPADM

## 2025-07-29 RX ORDER — CHLORHEXIDINE GLUCONATE ORAL RINSE 1.2 MG/ML
15 SOLUTION DENTAL EVERY 12 HOURS
Status: DISCONTINUED | OUTPATIENT
Start: 2025-07-29 | End: 2025-07-29

## 2025-07-29 RX ORDER — BISACODYL 10 MG
10 SUPPOSITORY, RECTAL RECTAL DAILY PRN
Status: ACTIVE | OUTPATIENT
Start: 2025-08-01

## 2025-07-29 RX ORDER — NITROGLYCERIN 10 MG/100ML
INJECTION INTRAVENOUS PRN
Status: DISCONTINUED | OUTPATIENT
Start: 2025-07-29 | End: 2025-07-29

## 2025-07-29 RX ORDER — NICOTINE POLACRILEX 4 MG
15-30 LOZENGE BUCCAL
Status: DISCONTINUED | OUTPATIENT
Start: 2025-07-29 | End: 2025-07-30

## 2025-07-29 RX ORDER — HEPARIN SOD,PORCINE/0.9 % NACL 30K/1000ML
INTRAVENOUS SOLUTION INTRAVENOUS
Status: DISCONTINUED | OUTPATIENT
Start: 2025-07-29 | End: 2025-07-29 | Stop reason: HOSPADM

## 2025-07-29 RX ORDER — LIDOCAINE 40 MG/G
CREAM TOPICAL
Status: DISCONTINUED | OUTPATIENT
Start: 2025-07-29 | End: 2025-07-29 | Stop reason: HOSPADM

## 2025-07-29 RX ORDER — NALOXONE HYDROCHLORIDE 1 MG/ML
0.4 INJECTION INTRAMUSCULAR; INTRAVENOUS; SUBCUTANEOUS
Status: ACTIVE | OUTPATIENT
Start: 2025-07-29

## 2025-07-29 RX ORDER — ACETAMINOPHEN 325 MG/1
975 TABLET ORAL
Status: DISCONTINUED | OUTPATIENT
Start: 2025-07-29 | End: 2025-07-29

## 2025-07-29 RX ORDER — LIDOCAINE HYDROCHLORIDE 10 MG/ML
INJECTION, SOLUTION INFILTRATION; PERINEURAL PRN
Status: DISCONTINUED | OUTPATIENT
Start: 2025-07-29 | End: 2025-07-29

## 2025-07-29 RX ORDER — ASPIRIN 81 MG/1
162 TABLET, CHEWABLE ORAL DAILY
Status: DISPENSED | OUTPATIENT
Start: 2025-07-30

## 2025-07-29 RX ORDER — CEFAZOLIN SODIUM/WATER 2 G/20 ML
2 SYRINGE (ML) INTRAVENOUS
Status: COMPLETED | OUTPATIENT
Start: 2025-07-29 | End: 2025-07-29

## 2025-07-29 RX ORDER — DEXMEDETOMIDINE HYDROCHLORIDE 4 UG/ML
.1-1.2 INJECTION, SOLUTION INTRAVENOUS CONTINUOUS
Status: DISCONTINUED | OUTPATIENT
Start: 2025-07-29 | End: 2025-07-30

## 2025-07-29 RX ORDER — ONDANSETRON 2 MG/ML
INJECTION INTRAMUSCULAR; INTRAVENOUS PRN
Status: DISCONTINUED | OUTPATIENT
Start: 2025-07-29 | End: 2025-07-29

## 2025-07-29 RX ORDER — HYDRALAZINE HYDROCHLORIDE 20 MG/ML
10 INJECTION INTRAMUSCULAR; INTRAVENOUS EVERY 30 MIN PRN
Status: ACTIVE | OUTPATIENT
Start: 2025-07-29

## 2025-07-29 RX ORDER — PROCHLORPERAZINE MALEATE 10 MG
10 TABLET ORAL EVERY 6 HOURS PRN
Status: ACTIVE | OUTPATIENT
Start: 2025-07-29

## 2025-07-29 RX ORDER — DEXTROSE MONOHYDRATE 25 G/50ML
25-50 INJECTION, SOLUTION INTRAVENOUS
Status: DISCONTINUED | OUTPATIENT
Start: 2025-07-29 | End: 2025-07-30

## 2025-07-29 RX ORDER — HYDROMORPHONE HYDROCHLORIDE 2 MG/1
2 TABLET ORAL EVERY 4 HOURS PRN
Status: DISCONTINUED | OUTPATIENT
Start: 2025-07-29 | End: 2025-07-30

## 2025-07-29 RX ORDER — FENTANYL CITRATE 50 UG/ML
100 INJECTION, SOLUTION INTRAMUSCULAR; INTRAVENOUS
Status: DISCONTINUED | OUTPATIENT
Start: 2025-07-29 | End: 2025-07-30

## 2025-07-29 RX ORDER — ASPIRIN 81 MG/1
162 TABLET, CHEWABLE ORAL ONCE
Status: COMPLETED | OUTPATIENT
Start: 2025-07-29 | End: 2025-07-29

## 2025-07-29 RX ORDER — PROTAMINE SULFATE 10 MG/ML
INJECTION, SOLUTION INTRAVENOUS PRN
Status: DISCONTINUED | OUTPATIENT
Start: 2025-07-29 | End: 2025-07-29

## 2025-07-29 RX ORDER — NICOTINE POLACRILEX 4 MG
15-30 LOZENGE BUCCAL
Status: DISCONTINUED | OUTPATIENT
Start: 2025-07-29 | End: 2025-07-29

## 2025-07-29 RX ORDER — ONDANSETRON 4 MG/1
4 TABLET, ORALLY DISINTEGRATING ORAL EVERY 6 HOURS PRN
Status: ACTIVE | OUTPATIENT
Start: 2025-07-29

## 2025-07-29 RX ORDER — METOPROLOL SUCCINATE 50 MG/1
50 TABLET, EXTENDED RELEASE ORAL EVERY MORNING
Status: DISCONTINUED | OUTPATIENT
Start: 2025-07-29 | End: 2025-07-29

## 2025-07-29 RX ORDER — DEXTROSE MONOHYDRATE 25 G/50ML
25-50 INJECTION, SOLUTION INTRAVENOUS
Status: DISCONTINUED | OUTPATIENT
Start: 2025-07-29 | End: 2025-07-29

## 2025-07-29 RX ORDER — NALOXONE HYDROCHLORIDE 1 MG/ML
0.1 INJECTION INTRAMUSCULAR; INTRAVENOUS; SUBCUTANEOUS
Status: DISCONTINUED | OUTPATIENT
Start: 2025-07-29 | End: 2025-07-30

## 2025-07-29 RX ORDER — FENTANYL CITRATE 50 UG/ML
INJECTION, SOLUTION INTRAMUSCULAR; INTRAVENOUS PRN
Status: DISCONTINUED | OUTPATIENT
Start: 2025-07-29 | End: 2025-07-29

## 2025-07-29 RX ORDER — ASPIRIN 300 MG/1
300 SUPPOSITORY RECTAL ONCE
Status: COMPLETED | OUTPATIENT
Start: 2025-07-29 | End: 2025-07-29

## 2025-07-29 RX ORDER — CALCIUM GLUCONATE 20 MG/ML
1 INJECTION, SOLUTION INTRAVENOUS
Status: ACTIVE | OUTPATIENT
Start: 2025-07-29 | End: 2025-08-06

## 2025-07-29 RX ORDER — HEPARIN SODIUM 5000 [USP'U]/.5ML
5000 INJECTION, SOLUTION INTRAVENOUS; SUBCUTANEOUS EVERY 8 HOURS
Status: DISPENSED | OUTPATIENT
Start: 2025-07-30

## 2025-07-29 RX ORDER — ONDANSETRON 2 MG/ML
4 INJECTION INTRAMUSCULAR; INTRAVENOUS EVERY 6 HOURS PRN
Status: ACTIVE | OUTPATIENT
Start: 2025-07-29

## 2025-07-29 RX ORDER — NOREPINEPHRINE BITARTRATE 0.02 MG/ML
.01-.1 INJECTION, SOLUTION INTRAVENOUS CONTINUOUS
Status: DISCONTINUED | OUTPATIENT
Start: 2025-07-29 | End: 2025-07-29 | Stop reason: HOSPADM

## 2025-07-29 RX ORDER — PHENYLEPHRINE HCL IN 0.9% NACL 50MG/250ML
.1-6 PLASTIC BAG, INJECTION (ML) INTRAVENOUS CONTINUOUS
Status: DISCONTINUED | OUTPATIENT
Start: 2025-07-29 | End: 2025-07-29 | Stop reason: HOSPADM

## 2025-07-29 RX ORDER — HYDROMORPHONE HCL IN WATER/PF 6 MG/30 ML
0.2 PATIENT CONTROLLED ANALGESIA SYRINGE INTRAVENOUS
Status: DISPENSED | OUTPATIENT
Start: 2025-07-29

## 2025-07-29 RX ORDER — FENTANYL CITRATE 50 UG/ML
25 INJECTION, SOLUTION INTRAMUSCULAR; INTRAVENOUS EVERY 5 MIN PRN
Status: DISCONTINUED | OUTPATIENT
Start: 2025-07-29 | End: 2025-07-29

## 2025-07-29 RX ORDER — HYDROXYZINE HYDROCHLORIDE 25 MG/1
25 TABLET, FILM COATED ORAL EVERY 6 HOURS PRN
Status: DISCONTINUED | OUTPATIENT
Start: 2025-07-29 | End: 2025-07-29

## 2025-07-29 RX ORDER — ASPIRIN 300 MG/1
300 SUPPOSITORY RECTAL DAILY
Status: DISCONTINUED | OUTPATIENT
Start: 2025-07-30 | End: 2025-07-30

## 2025-07-29 RX ORDER — HYDROMORPHONE HCL IN WATER/PF 6 MG/30 ML
0.4 PATIENT CONTROLLED ANALGESIA SYRINGE INTRAVENOUS EVERY 5 MIN PRN
Status: DISCONTINUED | OUTPATIENT
Start: 2025-07-29 | End: 2025-07-29

## 2025-07-29 RX ORDER — ASPIRIN 81 MG/1
162 TABLET, CHEWABLE ORAL
Status: COMPLETED | OUTPATIENT
Start: 2025-07-29 | End: 2025-07-29

## 2025-07-29 RX ORDER — HYDROMORPHONE HYDROCHLORIDE 4 MG/1
4 TABLET ORAL EVERY 4 HOURS PRN
Status: DISCONTINUED | OUTPATIENT
Start: 2025-07-29 | End: 2025-07-30

## 2025-07-29 RX ORDER — SODIUM CHLORIDE 9 MG/ML
INJECTION, SOLUTION INTRAVENOUS CONTINUOUS PRN
Status: DISCONTINUED | OUTPATIENT
Start: 2025-07-29 | End: 2025-07-29

## 2025-07-29 RX ORDER — VANCOMYCIN HYDROCHLORIDE 1 G/20ML
INJECTION, POWDER, LYOPHILIZED, FOR SOLUTION INTRAVENOUS PRN
Status: DISCONTINUED | OUTPATIENT
Start: 2025-07-29 | End: 2025-07-29 | Stop reason: HOSPADM

## 2025-07-29 RX ORDER — MAGNESIUM HYDROXIDE 1200 MG/15ML
LIQUID ORAL PRN
Status: DISCONTINUED | OUTPATIENT
Start: 2025-07-29 | End: 2025-07-29 | Stop reason: HOSPADM

## 2025-07-29 RX ORDER — PANTOPRAZOLE SODIUM 40 MG/1
40 TABLET, DELAYED RELEASE ORAL
Status: DISPENSED | OUTPATIENT
Start: 2025-07-30

## 2025-07-29 RX ORDER — POLYETHYLENE GLYCOL 3350 17 G/17G
17 POWDER, FOR SOLUTION ORAL DAILY
Status: DISPENSED | OUTPATIENT
Start: 2025-07-30

## 2025-07-29 RX ORDER — POTASSIUM CHLORIDE 29.8 MG/ML
20 INJECTION INTRAVENOUS ONCE
Status: COMPLETED | OUTPATIENT
Start: 2025-07-29 | End: 2025-07-29

## 2025-07-29 RX ORDER — HYDROMORPHONE HCL IN WATER/PF 6 MG/30 ML
0.2 PATIENT CONTROLLED ANALGESIA SYRINGE INTRAVENOUS EVERY 5 MIN PRN
Status: DISCONTINUED | OUTPATIENT
Start: 2025-07-29 | End: 2025-07-29

## 2025-07-29 RX ORDER — HEPARIN SODIUM 1000 [USP'U]/ML
INJECTION, SOLUTION INTRAVENOUS; SUBCUTANEOUS PRN
Status: DISCONTINUED | OUTPATIENT
Start: 2025-07-29 | End: 2025-07-29

## 2025-07-29 RX ORDER — FENTANYL CITRATE 50 UG/ML
50 INJECTION, SOLUTION INTRAMUSCULAR; INTRAVENOUS EVERY 5 MIN PRN
Status: DISCONTINUED | OUTPATIENT
Start: 2025-07-29 | End: 2025-07-29

## 2025-07-29 RX ORDER — CHLORHEXIDINE GLUCONATE ORAL RINSE 1.2 MG/ML
10 SOLUTION DENTAL ONCE
Status: COMPLETED | OUTPATIENT
Start: 2025-07-29 | End: 2025-07-29

## 2025-07-29 RX ORDER — CEFAZOLIN SODIUM 1 G/3ML
1 INJECTION, POWDER, FOR SOLUTION INTRAMUSCULAR; INTRAVENOUS EVERY 8 HOURS
Status: COMPLETED | OUTPATIENT
Start: 2025-07-29 | End: 2025-07-30

## 2025-07-29 RX ORDER — DEXMEDETOMIDINE HYDROCHLORIDE 4 UG/ML
.1-1.2 INJECTION, SOLUTION INTRAVENOUS CONTINUOUS
Status: DISCONTINUED | OUTPATIENT
Start: 2025-07-29 | End: 2025-07-29 | Stop reason: HOSPADM

## 2025-07-29 RX ORDER — AMOXICILLIN 250 MG
1 CAPSULE ORAL 2 TIMES DAILY
Status: DISPENSED | OUTPATIENT
Start: 2025-07-29

## 2025-07-29 RX ORDER — METHADONE HYDROCHLORIDE 10 MG/ML
INJECTION, SOLUTION INTRAMUSCULAR; INTRAVENOUS; SUBCUTANEOUS
Status: DISCONTINUED
Start: 2025-07-29 | End: 2025-07-29 | Stop reason: HOSPADM

## 2025-07-29 RX ADMIN — ALBUMIN HUMAN: 0.05 INJECTION, SOLUTION INTRAVENOUS at 11:22

## 2025-07-29 RX ADMIN — PHENYLEPHRINE HYDROCHLORIDE 50 MCG: 10 INJECTION INTRAVENOUS at 12:04

## 2025-07-29 RX ADMIN — Medication 1 UNITS: at 07:33

## 2025-07-29 RX ADMIN — POTASSIUM CHLORIDE 20 MEQ: 29.8 INJECTION, SOLUTION INTRAVENOUS at 14:33

## 2025-07-29 RX ADMIN — ACETAMINOPHEN 975 MG: 325 TABLET, FILM COATED ORAL at 21:25

## 2025-07-29 RX ADMIN — ASPIRIN 300 MG: 300 SUPPOSITORY RECTAL at 12:56

## 2025-07-29 RX ADMIN — ALBUMIN HUMAN 12.5 G: 0.05 INJECTION, SOLUTION INTRAVENOUS at 17:21

## 2025-07-29 RX ADMIN — PHENYLEPHRINE HYDROCHLORIDE 100 MCG: 10 INJECTION INTRAVENOUS at 09:09

## 2025-07-29 RX ADMIN — NITROGLYCERIN 20 MCG: 10 INJECTION INTRAVENOUS at 09:10

## 2025-07-29 RX ADMIN — ROCURONIUM 30 MG: 50 INJECTION, SOLUTION INTRAVENOUS at 09:28

## 2025-07-29 RX ADMIN — HYDROMORPHONE HYDROCHLORIDE 1 MG: 1 INJECTION, SOLUTION INTRAMUSCULAR; INTRAVENOUS; SUBCUTANEOUS at 09:22

## 2025-07-29 RX ADMIN — CEFAZOLIN 1 G: 1 INJECTION, POWDER, FOR SOLUTION INTRAMUSCULAR; INTRAVENOUS at 19:35

## 2025-07-29 RX ADMIN — FENTANYL CITRATE 50 MCG: 50 INJECTION, SOLUTION INTRAMUSCULAR; INTRAVENOUS at 07:14

## 2025-07-29 RX ADMIN — Medication 1 UNITS: at 07:35

## 2025-07-29 RX ADMIN — ROCURONIUM 50 MG: 50 INJECTION, SOLUTION INTRAVENOUS at 08:35

## 2025-07-29 RX ADMIN — DEXMEDETOMIDINE HYDROCHLORIDE 0.3 MCG/KG/HR: 400 INJECTION INTRAVENOUS at 07:46

## 2025-07-29 RX ADMIN — HYDROMORPHONE HYDROCHLORIDE 2 MG: 2 TABLET ORAL at 18:47

## 2025-07-29 RX ADMIN — CALCIUM GLUCONATE 2 G: 20 INJECTION, SOLUTION INTRAVENOUS at 13:19

## 2025-07-29 RX ADMIN — ASPIRIN 81 MG CHEWABLE TABLET 162 MG: 81 TABLET CHEWABLE at 05:39

## 2025-07-29 RX ADMIN — NITROGLYCERIN 40 MCG: 10 INJECTION INTRAVENOUS at 09:32

## 2025-07-29 RX ADMIN — METOPROLOL SUCCINATE 50 MG: 50 TABLET, EXTENDED RELEASE ORAL at 05:38

## 2025-07-29 RX ADMIN — ROCURONIUM 80 MG: 50 INJECTION, SOLUTION INTRAVENOUS at 07:26

## 2025-07-29 RX ADMIN — ALBUMIN HUMAN 12.5 G: 0.05 INJECTION, SOLUTION INTRAVENOUS at 16:05

## 2025-07-29 RX ADMIN — PHENYLEPHRINE HYDROCHLORIDE 100 MCG: 10 INJECTION INTRAVENOUS at 07:35

## 2025-07-29 RX ADMIN — PHENYLEPHRINE HYDROCHLORIDE 100 MCG: 10 INJECTION INTRAVENOUS at 07:32

## 2025-07-29 RX ADMIN — GLYCOPYRROLATE 0.2 MG: 0.2 INJECTION, SOLUTION INTRAMUSCULAR; INTRAVENOUS at 08:07

## 2025-07-29 RX ADMIN — NITROGLYCERIN 40 MCG: 10 INJECTION INTRAVENOUS at 09:23

## 2025-07-29 RX ADMIN — DEXMEDETOMIDINE HYDROCHLORIDE 1 MCG/KG/HR: 400 INJECTION INTRAVENOUS at 14:04

## 2025-07-29 RX ADMIN — FENTANYL CITRATE 50 MCG: 50 INJECTION, SOLUTION INTRAMUSCULAR; INTRAVENOUS at 07:19

## 2025-07-29 RX ADMIN — NICARDIPINE HYDROCHLORIDE 0.5 MG/HR: 0.2 INJECTION, SOLUTION INTRAVENOUS at 10:55

## 2025-07-29 RX ADMIN — ONDANSETRON 4 MG: 2 INJECTION INTRAMUSCULAR; INTRAVENOUS at 12:27

## 2025-07-29 RX ADMIN — AMINOCAPROIC ACID 1 G/HR: 250 INJECTION, SOLUTION INTRAVENOUS at 08:40

## 2025-07-29 RX ADMIN — Medication 0.5 MCG/KG/MIN: at 07:42

## 2025-07-29 RX ADMIN — Medication 20 MG: at 07:27

## 2025-07-29 RX ADMIN — PHENYLEPHRINE HYDROCHLORIDE 100 MCG: 10 INJECTION INTRAVENOUS at 09:01

## 2025-07-29 RX ADMIN — SENNOSIDES AND DOCUSATE SODIUM 1 TABLET: 50; 8.6 TABLET ORAL at 21:25

## 2025-07-29 RX ADMIN — Medication 2 UNITS: at 07:26

## 2025-07-29 RX ADMIN — CHLORHEXIDINE GLUCONATE 10 ML: 1.2 SOLUTION ORAL at 05:39

## 2025-07-29 RX ADMIN — PROPOFOL 160 MG: 10 INJECTION, EMULSION INTRAVENOUS at 07:26

## 2025-07-29 RX ADMIN — PROTAMINE SULFATE 200 MG: 10 INJECTION, SOLUTION INTRAVENOUS at 11:13

## 2025-07-29 RX ADMIN — SODIUM CHLORIDE: 9 INJECTION, SOLUTION INTRAVENOUS at 11:50

## 2025-07-29 RX ADMIN — NITROGLYCERIN 20 MCG: 10 INJECTION INTRAVENOUS at 09:45

## 2025-07-29 RX ADMIN — SODIUM CHLORIDE: 9 INJECTION, SOLUTION INTRAVENOUS at 07:39

## 2025-07-29 RX ADMIN — HEPARIN SODIUM 37000 UNITS: 1000 INJECTION, SOLUTION INTRAVENOUS; SUBCUTANEOUS at 09:33

## 2025-07-29 RX ADMIN — NOREPINEPHRINE BITARTRATE 0.02 MCG/KG/MIN: 0.02 INJECTION, SOLUTION INTRAVENOUS at 07:47

## 2025-07-29 RX ADMIN — SODIUM CHLORIDE, SODIUM LACTATE, POTASSIUM CHLORIDE, AND CALCIUM CHLORIDE 250 ML: .6; .31; .03; .02 INJECTION, SOLUTION INTRAVENOUS at 15:52

## 2025-07-29 RX ADMIN — LIDOCAINE HYDROCHLORIDE 5 ML: 10 INJECTION, SOLUTION INFILTRATION; PERINEURAL at 07:26

## 2025-07-29 RX ADMIN — HYDROMORPHONE HYDROCHLORIDE 0.2 MG: 0.2 INJECTION, SOLUTION INTRAMUSCULAR; INTRAVENOUS; SUBCUTANEOUS at 17:39

## 2025-07-29 RX ADMIN — ROCURONIUM 20 MG: 50 INJECTION, SOLUTION INTRAVENOUS at 08:20

## 2025-07-29 RX ADMIN — SODIUM CHLORIDE, SODIUM LACTATE, POTASSIUM CHLORIDE, AND CALCIUM CHLORIDE 250 ML: .6; .31; .03; .02 INJECTION, SOLUTION INTRAVENOUS at 15:35

## 2025-07-29 RX ADMIN — EPINEPHRINE 0.02 MCG/KG/MIN: 1 INJECTION INTRAMUSCULAR; INTRAVENOUS; SUBCUTANEOUS at 11:06

## 2025-07-29 RX ADMIN — SUGAMMADEX 200 MG: 100 INJECTION, SOLUTION INTRAVENOUS at 12:23

## 2025-07-29 RX ADMIN — MIDAZOLAM HYDROCHLORIDE 2 MG: 1 INJECTION, SOLUTION INTRAMUSCULAR; INTRAVENOUS at 07:10

## 2025-07-29 RX ADMIN — Medication 2 G: at 11:38

## 2025-07-29 RX ADMIN — ALBUMIN HUMAN: 0.05 INJECTION, SOLUTION INTRAVENOUS at 11:32

## 2025-07-29 RX ADMIN — AMINOCAPROIC ACID 5 G: 250 INJECTION, SOLUTION INTRAVENOUS at 07:46

## 2025-07-29 RX ADMIN — SODIUM CHLORIDE: 9 INJECTION, SOLUTION INTRAVENOUS at 07:42

## 2025-07-29 RX ADMIN — Medication 2 G: at 07:38

## 2025-07-29 RX ADMIN — NITROGLYCERIN 20 MCG: 10 INJECTION INTRAVENOUS at 08:17

## 2025-07-29 RX ADMIN — SODIUM CHLORIDE, SODIUM LACTATE, POTASSIUM CHLORIDE, AND CALCIUM CHLORIDE: .6; .31; .03; .02 INJECTION, SOLUTION INTRAVENOUS at 07:10

## 2025-07-29 RX ADMIN — INSULIN HUMAN 1 UNITS/HR: 1 INJECTION, SOLUTION INTRAVENOUS at 12:34

## 2025-07-29 RX ADMIN — LIDOCAINE 1 PATCH: 4 PATCH TOPICAL at 13:09

## 2025-07-29 ASSESSMENT — ACTIVITIES OF DAILY LIVING (ADL)
ADLS_ACUITY_SCORE: 39
ADLS_ACUITY_SCORE: 37
ADLS_ACUITY_SCORE: 37
ADLS_ACUITY_SCORE: 22
ADLS_ACUITY_SCORE: 22
ADLS_ACUITY_SCORE: 37
ADLS_ACUITY_SCORE: 39
ADLS_ACUITY_SCORE: 22
ADLS_ACUITY_SCORE: 37
ADLS_ACUITY_SCORE: 26
ADLS_ACUITY_SCORE: 22
ADLS_ACUITY_SCORE: 37
ADLS_ACUITY_SCORE: 26

## 2025-07-29 NOTE — PROGRESS NOTES
CT EXTUBATION FAST TRACK:    [facility] - ICU     FastTrack Candidate: Yes, Extubation Goal Time ( 6 Hours ) 1650     Patient Status: Extubated          [facility] SAT / SBT Record:      SAT Safety Screen PASSED   If FAILED why?    SAT Performed PASSED   If FAILED why?    SBT Safety Screen PASSED   If FAILED why?

## 2025-07-29 NOTE — ANESTHESIA CARE TRANSFER NOTE
Patient: Hung Newell    Procedure: Procedure(s):  CORONARY ARTERY BYPASS GRAFT TIMES TWO, EPI-AORTIC ULTRASOUND, TRANSESOPHAGEAL ECHOCARDIOGRAM  ENDOSCOPIC SURGICAL PROCUREMENT, ARTERY, RADIAL       Diagnosis: Hypercholesterolemia [E78.00]  Family history of premature coronary artery disease [Z82.49]  Unstable angina (H) [I20.0]  Coronary artery calcification [I25.10]  Diagnosis Additional Information: No value filed.    Anesthesia Type:   General     Note:    Oropharynx: endotracheal tube in place  Level of consciousness: intubated/sedated/on ventilator.      Independent Airway: airway patency not satisfactory and stable (intubated/sedated/on ventilator)  Dentition: dentition unchanged  Vital Signs Stable: post-procedure vital signs reviewed and stable  Report to RN Given: handoff report given  Patient transferred to: ICU    ICU Handoff: Call for PAUSE to initiate/utilize ICU HANDOFF, Identified Patient, Identified Responsible Provider, Reviewed the Pertinent Medical History, Discussed Surgical Course, Reviewed Intra-OP Anesthesia Management and Issues during Anesthesia, Set Expectations for Post Procedure Period and Allowed Opportunity for Questions and Acknowledgement of Understanding      Vitals:  Vitals Value Taken Time   /70    Temp     Pulse 76 07/29/25 12:24   Resp 20    SpO2 99 % 07/29/25 12:24   Vitals shown include unfiled device data.    Electronically Signed By: CHARMAINE Bryant CRNA  July 29, 2025  12:25 PM

## 2025-07-29 NOTE — ANESTHESIA PROCEDURE NOTES
Central Line/PA Catheter Placement    Pre-Procedure   Staff -        Anesthesiologist:  Behrens, Christopher J, MD       Performed By: anesthesiologist       Location: OR       Pre-Anesthestic Checklist: patient identified, IV checked, site marked, risks and benefits discussed, informed consent, monitors and equipment checked, pre-op evaluation, at physician/surgeon's request and post-op pain management  Timeout:       Correct Patient: Yes        Correct Procedure: Yes        Correct Site: Yes        Correct Position: Yes        Correct Laterality: Yes   Line Placement:   This line was placed Post Induction    Procedure   Procedure: central line       Laterality: right       Insertion Site: internal jugular.       Patient Position: Trendelenburg  Sterile Prep        All elements of maximal sterile barrier technique followedall elements of maximal sterile barrier technique not followed for medical reasons       Skin prep: Chloraprep  Insertion/Injection        Technique: Seldinger Technique        1. Ultrasound was used to evaluate the access site.       2. Vein evaluated via ultrasound for patency/adequacy.       3. Using real-time ultrasound the needle/catheter was observed entering the artery/vein.       4. Permanent image was captured and entered into the patient's record.       5. The visualized structures were anatomically normal.       6. There were no apparent abnormal pathologic findings.       Introducer Type: 9 Fr, 2-lumen MAC        Type: Introducer  Narrative         Secured by: suture       Tegaderm and [id number] dressing used.       Complications: None apparent,        blood aspirated from all lumens,        All lumens flushed: Yes       Verification method: Placement to be verified post-op and BROWN       Tip termination: The catheter tip was threaded to reside in the right atrium subject to post surgical x-ray

## 2025-07-29 NOTE — PLAN OF CARE
Problem: Chest Pain  Goal: Resolution of Chest Pain Symptoms  Outcome: Progressing   Goal Outcome Evaluation:       No reports of chest pain or discomfort overnight. Evening shower and skin prep done. AM shower and skin prep done. Patient watched all of education videos. Nasal swab done, swish and spit done, heart Meilax in place, medications given per order. Wife and son at bedside this AM. All personal belongings sent with wife.

## 2025-07-29 NOTE — INTERIM SUMMARY
Chart reviewed. Pt in surgery at time of chart review. Will go to ICU after. Will continue to follow for now.

## 2025-07-29 NOTE — PROGRESS NOTES
RESPIRATORY CARE NOTE     Patient Name: Hung Newell  Today's Date: 7/29/2025           Patient extubated at 16:50 pm and placed on 3LNC.  BS: clear to auscultations upper airways and decreased @ bases. Patient did exhibit strong cough upon extubation and was able to verbalize effectively. No complications have been observed at this time.  Will continue to monitor closely.     Cassius Houser, RRT

## 2025-07-29 NOTE — ANESTHESIA POSTPROCEDURE EVALUATION
Patient: Hung Newell    Procedure: Procedure(s):  CORONARY ARTERY BYPASS GRAFT TIMES TWO, EPI-AORTIC ULTRASOUND, TRANSESOPHAGEAL ECHOCARDIOGRAM  ENDOSCOPIC SURGICAL PROCUREMENT, ARTERY, RADIAL       Anesthesia Type:  General    Note:  Disposition: ICU            ICU Sign Out: Anesthesiologist/ICU physician sign out WAS performed   Postop Pain Control: Uneventful            Sign Out: Well controlled pain   PONV: No   Neuro/Psych: Uneventful            Sign Out: Acceptable/Baseline neuro status   Airway/Respiratory: Uneventful            Sign Out: AIRWAY IN SITU/Resp. Support               Airway in situ/Resp. Support: ETT                 Reason: Planned Pre-op   CV/Hemodynamics: Uneventful            Sign Out: Acceptable CV status   Other NRE: NONE   DID A NON-ROUTINE EVENT OCCUR? No           Last vitals:  Vitals:    07/29/25 0605 07/29/25 1220 07/29/25 1223   BP: 128/81     Pulse: 78 75 75   Resp: 16 18    Temp: 37.1  C (98.8  F)  36.3  C (97.4  F)   SpO2: 96% 99% 99%       Electronically Signed By: Christopher J. Behrens, MD  July 29, 2025  1:31 PM

## 2025-07-29 NOTE — CONSULTS
PULMONARY / CRITICAL CARE CONSULTATION NOTE      Consultation - Pulmonary/Critical Care Medicine  Hung Newell,  1970, MRN [id number]  Date / Time of Admission:  2025  6:33 AM    Admitting Dx: Hypercholesterolemia [E78.00]  Family history of premature coronary artery disease [Z82.49]  Unstable angina (H) [I20.0]  Status post coronary angiogram [Z98.890]  CAD in native artery [I25.10]    PCP: No Ref-Primary, Physician, None  Consulting physician:  Allison Frazier MD   Code status:  Full Code       Extended Emergency Contact Information  Primary Emergency Contact: magaly Newell  Address: [address] [place] [address]  Mobile Phone: [phone]  Relation: Spouse       ID:  Hung Newell is a 55 year old male with CAD. Underwent CABG x 2 with Dr. Leiva today.         ASSESSMENT & PLAN BY SYSTEM   Systems to Assess:   Pulmonary: Post op vent management; no underlying lung disease documented.   Wean supplemental O2 as tolerated; goal O2 sat > 92%.  HOB > 30 degrees to limit aspiration risk.    Check ABG and adjust support as indicated; avoid acidotic state.   Check CXR  Once hemodynamically stable, plan to proceed to wake, wean, and extubate with goal < 6-hours.  Goal extubation by   Cardiovascular: CAD with tight LM lesion. S/P CABG x 2 with Dr. Leiva today. Hx HLD  Cardiac monitoring.   SBP goal > 90 mmHg, MAP goal > 65 mmHg.    Goal CI 2-3   Goal CVP 10  Vasoactive gtts per CV-surgery.   Temporary pacing wires present; will use in setting of symptomatic arrhythmia.   Currently paced @ back up 60  Underlying rhythm: sinus   Neurological: sedation for vent synchrony and comfort.    Neuro checks per ICU protocol.   Sedate with precedex until ready to wean and extubate.   Pain control: PRN  Goal RASS 0 to -1   GI/: no acute issues   NPO until extubated and fully awake.   Quinn catheter in place for accurate measurement of I/O.   GI prophylaxis:   Omeprazole  Renal: no acute issues    Monitor I/O's.  Electrolyte repletion PRN.  Avoid/limit nephrotoxic agents.   IVFs: per CV-surgery  Heme/Coag: Acute blood loss anemia; coagulopathy secondary to pump run.   Monitor H/H.   Transfuse per CV-surgery.   Monitor chest tube output hourly; notify CV-surgery for excessive output or abrupt stop in output.   DVT prophylaxis:  SubQ heparin  Infectious disease:   General precautions.   Remove lines and drains once no longer required.   Endocrine: hyperglycemia    RHI gtt per ICU protocol.   Musculoskeletal:   Bedrest; initiate mobility protocol.     Lines: A-line, Day# 1, Central line, Day# 1 or Mosinee Ester, Day# 1      Code Status:  FULL CODE    Thank you for this consultation.  Please call if any questions or concerns.        This patient is considered critically ill and requires ICU level of care due to immediate post CV-surgical procedure. High risk for acute hemodynamic collapse and death.     Total Critical Care time, not including separate billable procedure time:  31 minutes.    Aurelia Alvarez, CNP  [facility] Pulmonary/Critical Care      Chief Complaint chief complaint       HPI    Hung Newell is a 55 year old male with CAD, hypercholesterol, preDM. Underwent CABG x 2 with Dr. Leiva today. Intrao-op course unremarkable. Easy airway and no difficulty with line placement. Received 20mg IV Methadone, 2 mg Versed, 100mcg Fentanyl, 1mg dilaudid. Preop echo showed normal EF and no valve abnormalities; this was preserved post procedure. No difficulty going on nor coming off pump. EBL 250mL; transfused with Cell Saver 370mL, no bank blood. No bleeding concerns. Came up on small dose epi and cardene (radial artery graft), in native sinus rhythm with pacer at back up. Sedated on full vent support.     Direct sign out received at the bedside from Dr. Leiva and Dr. Behrens.        Review of Systems   Review of systems not obtained due to patient factors.      Active Problem List   Patient Active Problem List    Diagnosis Date Noted    CAD in native artery 07/29/2025     Priority: Medium    Pre-diabetes 07/26/2025     Priority: Medium    Status post coronary angiogram 07/25/2025     Priority: Medium    Hypercholesterolemia 07/25/2025     Priority: Medium    CAD (coronary artery disease) 07/25/2025     Priority: Medium         Medical/Surgical History   Past Medical History:   Diagnosis Date    Coronary artery disease 04/2022    coronary calcification score 44    Hyperlipidemia      Past Surgical History:   Procedure Laterality Date    CV CORONARY ANGIOGRAM N/A 7/25/2025    Procedure: Coronary Angiogram;  Surgeon: Gagan Benites MD;  Location: [facility] CATH LAB CV    CV LEFT HEART CATH N/A 7/25/2025    Procedure: Left Heart Catheterization;  Surgeon: Gagan Benites MD;  Location: [facility] CATH LAB CV    PLEURAL SCARIFICATION           Allergies   Allergies   Allergen Reactions    Pollen Extract          Medications:  OUTpatient medications   Prior to Admission medications   Medication Sig Start Date End Date Taking? Authorizing Provider   aspirin (ASA) 81 MG chewable tablet Take 81 mg by mouth daily.   Yes Reported, Patient   ezetimibe (ZETIA) 10 MG tablet Take 1 tablet (10 mg) by mouth daily. 2/28/25  Yes Sultana Davenport MD   nitroGLYcerin (NITROSTAT) 0.4 MG sublingual tablet For chest pain place 1 tablet under the tongue every 5 minutes for 3 doses. If symptoms persist 5 minutes after 1st dose call 911. 7/23/25  Yes Sultana Davenport MD   rosuvastatin (CRESTOR) 40 MG tablet TAKE ONE (1) TABLET (40 MG) BY MOUTH DAILY 3/19/25  Yes Sultana Davenport MD           Medications:  INpatient medications   Current Facility-Administered Medications   Medication Dose Route Frequency Provider Last Rate Last Admin    acetaminophen (TYLENOL) tablet 975 mg  975 mg Oral Q8H [facility] Ivet Mae PA-C        aspirin (ASA) chewable tablet 162 mg  162 mg Oral or NG Tube Once Tu  Ivet QUEZADA PA-C        Or    aspirin (ASA) Suppository 300 mg  300 mg Rectal Once Ivet Mae PA-C        [START ON 7/30/2025] aspirin (ASA) chewable tablet 162 mg  162 mg Oral or NG Tube Daily Ivet Mae PA-C        Or    [START ON 7/30/2025] aspirin (ASA) Suppository 300 mg  300 mg Rectal Daily Ivet Mae PA-C        ceFAZolin (ANCEF) 1 g vial to attach to  ml bag for ADULT or 50 ml bag for PEDS  1 g Intravenous Q8H Ivet Mae PA-C        chlorhexidine (PERIDEX) 0.12 % solution 15 mL  15 mL Mouth/Throat Q12H Aurelia Alvarez APRN CNP        [START ON 7/30/2025] heparin ANTICOAGULANT injection 5,000 Units  5,000 Units Subcutaneous Q8H Ivet Mae PA-C        Lidocaine (LIDOCARE) 4 % Patch 1-2 patch  1-2 patch Transdermal Q24H Ivet Mae PA-C        [START ON 7/30/2025] pantoprazole (PROTONIX) 2 mg/mL suspension 40 mg  40 mg Oral or NG Tube QAM AC Ivet Mae PA-C        Or    [START ON 7/30/2025] pantoprazole (PROTONIX) EC tablet 40 mg  40 mg Oral QAM AC Ivet Mae PA-C        [START ON 7/30/2025] polyethylene glycol (MIRALAX) Packet 17 g  17 g Oral Daily Ivet Mae PA-C        senna-docusate (SENOKOT-S/PERICOLACE) 8.6-50 MG per tablet 1 tablet  1 tablet Oral BID Ivet Mae PA-C               Family History  Social History   Reviewed  Family History   Problem Relation Age of Onset    Coronary Artery Disease Sister     Coronary Artery Disease Brother     Acute Myocardial Infarction Brother 58.00    Coronary Artery Disease Sister     Coronary Artery Disease Brother     Heart Failure Mother     Heart Failure Father      Reviewed  Social History     Socioeconomic History    Marital status:      Spouse name: Not on file    Number of children: Not on file    Years of education: Not on file    Highest education level: Not on file   Occupational History    Not on file   Tobacco Use    Smoking status:  Never    Smokeless tobacco: Never   Substance and Sexual Activity    Alcohol use: Yes     Comment: Alcoholic Drinks/day: Occasional    Drug use: No    Sexual activity: Not on file   Other Topics Concern    Not on file   Social History Narrative    Not on file     Social Drivers of Health     Financial Resource Strain: Low Risk  (7/26/2025)    Financial Resource Strain     Within the past 12 months, have you or your family members you live with been unable to get utilities (heat, electricity) when it was really needed?: No   Food Insecurity: Low Risk  (7/26/2025)    Food Insecurity     Within the past 12 months, did you worry that your food would run out before you got money to buy more?: No     Within the past 12 months, did the food you bought just not last and you didn t have money to get more?: No   Transportation Needs: Low Risk  (7/26/2025)    Transportation Needs     Within the past 12 months, has lack of transportation kept you from medical appointments, getting your medicines, non-medical meetings or appointments, work, or from getting things that you need?: No   Physical Activity: Not on file   Stress: Not on file   Social Connections: Unknown (1/1/2022)    Received from [facility]    Social Connections     Frequency of Communication with Friends and Family: Not on file   Interpersonal Safety: Low Risk  (7/25/2025)    Interpersonal Safety     Do you feel physically and emotionally safe where you currently live?: Yes     Within the past 12 months, have you been hit, slapped, kicked or otherwise physically hurt by someone?: No     Within the past 12 months, have you been humiliated or emotionally abused in other ways by your partner or ex-partner?: No   Housing Stability: Low Risk  (7/26/2025)    Housing Stability     Do you have housing? : Yes     Are you worried about losing your housing?: No      Psychosocial Needs  Social History     Social History Narrative    Not on  "file     Additional psychosocial needs reviewed per nursing assessment.      Physical Exam   VITALS:  /81   Pulse 75   Temp 97.4  F (36.3  C)   Resp 18   Ht 1.702 m (5' 7\")   Wt 78.9 kg (173 lb 14.4 oz)   SpO2 99%   BMI 27.24 kg/m    [unfilled]    PHYSICAL EXAM:   GEN: intubated and sedated  HEENT:  OETT in place; AT/NC  NECK: Supple.  RIJ introducer in place.   PULM:  unlabored on full vent. Lungs are clear and equal. No wheeze   CVS:   RRR S1S2 no murmur   ABDOMEN:   soft ntnd  EXTREMITIES/SKIN:    visible skin intact   NEURO:  .  Sedated     I&O:    Intake/Output Summary (Last 24 hours) at 7/29/2025 1234  Last data filed at 7/29/2025 1155  Gross per 24 hour   Intake 3270 ml   Output 875 ml   Net 2395 ml        Pertinent Labs   Lab Results: personally reviewed.      CBC:  Recent Labs   Lab 07/29/25  1127 07/29/25  1125 07/29/25  1046 07/29/25  0937 07/29/25  0442 07/26/25  0324 07/25/25  0700   WBC 14.4*  --   --   --  6.8  --  5.9   HGB 12.4* 12.6* 12.3*   < > 15.5   < > 15.9   HCT 35.7*  --   --   --  45.2  --  45.9     --   --   --  222  --  238    < > = values in this interval not displayed.     Chemistry:  Recent Labs   Lab 07/29/25  1125 07/29/25  1046 07/29/25  1012 07/29/25  0937 07/29/25  0442 07/26/25  0324 07/25/25  0700    137 137   < > 140 140 140   CO2  --   --   --   --  25 27 26   BUN  --   --   --   --  10.9 9.3 10.0    < > = values in this interval not displayed.     Coags:  Lab Results   Component Value Date    INR 1.31 (H) 07/29/2025    INR 0.93 07/28/2025    PROTIME 16.4 (H) 07/29/2025    PROTIME 12.7 07/28/2025        Microbiology:  None this admission        Radiology:  Chest X-Ray: post op film pending     "

## 2025-07-29 NOTE — ANESTHESIA PROCEDURE NOTES
Arterial Line Procedure Note    Pre-Procedure   Staff -        Anesthesiologist:  Behrens, Christopher J, MD       Performed By: anesthesiologist       Location: OR       Pre-Anesthestic Checklist: patient identified, IV checked, risks and benefits discussed, informed consent, monitors and equipment checked, pre-op evaluation and at physician/surgeon's request  Timeout:       Correct Patient: Yes        Correct Procedure: Yes        Correct Site: Yes        Correct Position: Yes   Line Placement:   This line was placed Pre Induction starting at 7/29/2025 7:12 AM and ending at 7/29/2025 7:20 AM  Procedure   Procedure: arterial line       Laterality: right       Insertion Site: radial.  Sterile Prep        Standard elements of sterile barrier followed       Skin prep: Chloraprep  Insertion/Injection        Technique: Seldinger Technique and ultrasound guided        1. Ultrasound was used to evaluate the access site.       2. Artery evaluated via ultrasound for patency/adequacy.       3. Using real-time ultrasound the needle/catheter was observed entering the artery/vein.       5. The visualized structures were anatomically normal.       6. There were no apparent abnormal pathologic findings.       Catheter Type/Size: 20 G, 12 cm  Narrative         Secured by: anchor securement device       Biopatch dressing used.       Complications: None apparent,        Arterial waveform: Yes        IBP within 10% of NIBP: Yes

## 2025-07-29 NOTE — BRIEF OP NOTE
[facility]    Brief Operative Note    Pre-operative diagnosis: Hypercholesterolemia [E78.00]  Family history of premature coronary artery disease [Z82.49]  Unstable angina (H) [I20.0]  Coronary artery calcification [I25.10]  Post-operative diagnosis Same as pre-operative diagnosis    Procedure: CORONARY ARTERY BYPASS GRAFT TIMES TWO, EPI-AORTIC ULTRASOUND, TRANSESOPHAGEAL ECHOCARDIOGRAM, N/A - Chest  ENDOSCOPIC SURGICAL PROCUREMENT, ARTERY, RADIAL, Left - Arm Lower    Surgeon: Surgeons and Role:     * Daniella Leiva MD - Primary     * Hilary Huang MD - Assisting  Anesthesia: General   Estimated Blood Loss: 250 ml    Drains: 2 mediastinal tubes and 1 left pleural tube  Specimens: * No specimens in log *  Findings:   Good targets identified on OM1 and LAD.  Complications: None.  Implants: * No implants in log *

## 2025-07-29 NOTE — OP NOTE
OPERATIVE REPORT     OPERATING ROOM:  Room 8    July 29, 2025    PROCEDURES PERFORMED:   Median sternotomy   Take down of the left internal mammary artery    Endoscopic radial artery procurement from the left foream    Epiaortic ultrasound of the ascending aorta    Placement on central cardiopulmonary bypass    Double vessel coronary artery bypass grafting;   -  Left internal mammary artery to the left anterior descending coronary artery  -  Left radial artery to the obtuse marginal coronary artery.    Placement of temporary atrial and ventricular pacing wires     SURGEONS:    Attending Surgeon:  Daniella Leiva MD  First Assistant: Jud Stack. STSAC  Cardiac Surgery Fellow:  Hilary Huang MD     ANESTHESIA:  General endotracheal    SKIN PREP:  Betadine and Duraprep    INCISION:  Median sternotomy, skip incisions left wrist and elbow      DRAINS: Twp 32 Fr mediastinal and one 24 Fr Morales drain left pleural space  CULTURES: None  SPECIMENS: None  CLOSURE: Routine     PREOPERATIVE DIAGNOSES:  Progressive angina  Left main coronary artery disease  Preserved left ventricular function     POSTOPERATIVE DIAGNOSES:  Same     BRIEF HISTORY:    Hung Newell is a 55 year old year old gentleman who presented with progressive angina.  Coronary angiography revealed left main coronary artery disease on coronary angiogram    The above procedures were planned.     FINDINGS AT OPERATION:  His ascending aorta was normal in size and free of any plaque seen on epiaortic ultrasound.  His pulmonary artery pressures were normal to palplation.  His left ventricular function was normal.  The left internal mammary artery was a 2.5 mm in diameter conduit with excellent flow.  The left radial artery was a 2.5 mm in diameter conduit of excellent quality.   The most distal obtuse marginal was a 1.5 mm in diameter target vessel, an excellent vessel for bypass grafting.  The left anterior descending coronary artery in its distal middle  third was a 2 mm in diameter target vessel, an excellent vessel for bypass grafting.        PROCEDURES:  The patient arrived in the operating room and was positioned supine.  An arterial line was placed.  Satisfactory general endotracheal anesthesia was induced.  A transesophageal probe, central line and Quinn catheter were inserted.  The patient's neck, chest, abdomen, both groins and lower extremities, and the left upper extremity were prepped and draped in a standard sterile fashion.      A complete median sternotomy was made.  With the aid of the Rultract retractor, the left internal mammary artery was taken down from the xiphoid process to the subclavian vein.  At the same time Jud Stack made an incision in the left wrist and the radial artery was occluded.  The palmar arch was noted to be intact.  Using endoscopic technique the radial artery was dissected out from the wrist to the elbow.  A second incision was made at the elbow.  The radial artery was divided proximally and distally and prepared in the usual fashion.  The wrist and elbow wounds were rendered hemostatic and closed in layers using running 2-0 and 3-0 Vicyrl as well as Dermabond on the skin.     Heparin was administered.  The left internal mammary artery was prepared in the usual fashion.  An Octobase retractor was inserted.  The pericardium was opened and tented up on pericardial sutures.  The aorta was  from the pulmonary artery.  Epiaortic ultrasound of the ascending aorta was carried out.  Pursestring sutures were placed in the ascending aorta and right atrium for cannulation.  When the ACT was appropriate cannulation was performed and central cardiopulmonary bypass was established.  The patient's temperature was allowed to drift.  A retrograde cardioplegia catheter was placed in the coronary sinus via the right atrium. The aorta was cross-clamped and 500 mL of cold blood cardioplegia was administered antegrade and an additional 700  mL of cold blood cardioplegia was administered retrograde.  A good arrest was achieved.  Cold topical saline and slush was applied to the heart intermittently during the period of aortic cross-clamp.      Attention was turned to the most distal obtuse marginal  coronary artery.  It was dissected out for a distance of 1 cm and then opened for a distance of 8 mm.  It was bypassed in an end to side fashion using the distal radial artery and running 7-0 Prolene.  The patient received another 400 mL of cold blood cardioplegia in a retrograde fashion. Attention was then turned to the left anterior descending coronary artery in the interventricular groove.  It was dissected out in its distal middle third for a distance of 1 cm and then opened for a distance of 8 mm.  A pleural rent was created for passage of the left internal mammary artery and then the final distal anastomosis was performed between the left internal mammary artery and the left anterior descending coronary artery in an end to side fashion using running 7-0 Prolene. As this last distal anastomosis was being performed gentle warming was begun.  The gray occluder was briefly released from the left internal mammary artery and good flow was seen down the left anterior descending coronary artery.  The gray occluder was once more applied to the left internal mammary artery and the patient received a final dose of cold blood cardioplegia in a retrograde fashion.  It had been decided to perform the single proximal anastomosis with the aortic cross-clamp in place.  Therefore 1, 4 mm punch aortotomies were created.  The proximal aortoradial anastomosis were performed in an end to side fashion using running 6-0 Prolene.     The gray occluder was released from the left internal mammary artery and a hot shot was delivered in a retrograde fashion.  The aortic cross-clamp was released.  The aortic cross-clamp time was 1 hour and 3 minutes.  He required defibrillation 3  times.  The proximal and distal anastomoses were examined and found to be hemostatic.  Ventricular and atrial pacing wires were applied and the patient was A-V sequentially paced at a rate of 90 until his own rhythm returned.  The retrograde cardioplegia catheter was removed and that site repaired with two sets of 4-0 Prolene.  The left and right pleural spaces were drained of any accumulated blood and gentle ventilation resumed. The root vent was removed and that site repaired with plegetted 4-0 Prolene.      The patient was placed on low dose epinephrine and cardene. When he was warm the heart was allowed to fill and eject and the patient  from cardiopulmonary bypass without difficulty. Total time on cardiopulmonary bypass was 1 hour and 17 minutes.  Protamine was administered to reverse the heparin.  The patient was decannulated and the cannulation sites were repaired with 4-0 Prolene.  Hemostasis was satisfactory.    The drains were placed and secured to the skin. The sternum was approximated with a combination of single and double stainless steel sternal wires.   The sternal wound was irrigated with warm antibiotic-containing solution.  It was closed in 4 layers using 0 Stratafix for 2 layers, 2-0 Stratafix for the next layer and a subcuticular stitch of 4-0 Monocryl.      The sponge, needle and instrument counts were reported as correct.      The estimated blood loss was 300 mL.      Hung Newell was brought to the Intensive Care Unit in critical but stable condition.    Complications: None     Daniella Leiva MD on 7/29/2025 at 12:07 PM

## 2025-07-29 NOTE — PRE-PROCEDURE
The patient was seen and examined by me.  He has had some chest pain for about 2 months.  He has had bilateral vein stripping for varicose veins.  He has severe left main disease.  We plan to perform a multi-vessel coronary artery bypass grafting procedure.  For conduit we will use the left internal mammary artery, the left radial artery and possibly the lesser saphenous vein.  He understands that the risks for this procedure include: bleeding, infection, stroke, sternal dehiscence, myocardial infarction, arrhythmias, the need for a pacemaker, prolonged ventilation, pneumonia, liver/renal failure, aortic dissection, and an operative mortality of about 2 percent.  He accepts these risks and wishes to proceed.  Informed consent has been obtained.

## 2025-07-29 NOTE — ANESTHESIA PROCEDURE NOTES
Perioperative BROWN Procedure Note    Staff -        Anesthesiologist:  Behrens, Christopher J, MD       Performed By: anesthesiologist  Preanesthesia Checklist:  Patient identified, IV assessed, risks and benefits discussed, monitors and equipment assessed, procedure being performed at surgeon's request and anesthesia consent obtained.    BROWN Probe Insertion    Probe Status PRE Insertion: NO obvious damage  Probe type:  Adult 2D  Bite block used:   Soft  Insertion Technique: Jaw Lift  Insertion complications: None obvious  Billing Report:BROWN report by Anesthesiologist (See Separate Report note)  Probe Status POST Removal: NO obvious damage    BROWN Report  General Procedure Information  Images for this study have been archived.  Modalities: 2D, Color flow mapping, 3D, CW Doppler and PW Doppler  Echocardiographic and Doppler Measurements  Right Ventricle:  Cavity size normal.    Hypertrophy not present.   Thrombus not present.    Global function normal.   Ejection Fraction 45%.    Left Ventricle:  Cavity size normal.    Hypertrophy not present.   Thrombus not present.   Global Function normal.   Ejection Fraction 55%.      Ventricular Regional Function:  1- Basal Anteroseptal:  normal  2- Basal Anterior:  normal  3- Basal Anterolateral:  normal  4- Basal Inferolateral:  normal  5- Basal Inferior:  normal  6- Basal Inferoseptal:  normal  7- Mid Anteroseptal:  normal  8- Mid Anterior:  normal  9- Mid Anterolateral:  normal  10- Mid Inferolateral:  normal  11- Mid Inferior:  normal  12- Mid Inferoseptal:  normal  13- Apical Anterior:  normal  14- Apical Lateral:  normal  15- Apical Inferior:  normal  16- Apical Septal:  normal  17- Moseley:  normal    Valves  Aortic Valve: Annulus normal.  Stenosis not present.  Regurgitation absent.  Leaflets normal.  Leaflet motions normal.    Mitral Valve: Annulus normal.  Stenosis not present.  Regurgitation +2.  Leaflets normal.  Leaflet motions normal.    Tricuspid Valve: Annulus  normal.  Stenosis not present.  Regurgitation absent.  Leaflets normal.  Leaflet motions normal.    Pulmonic Valve: Annulus normal.  Stenosis not present.  Regurgitation absent.      Aorta: Ascending Aorta: Size normal.  Dissection not present.  Plaque thickness less than 3 mm.  Mobile plaque not present.    Aortic Arch: Size normal.    Dissection not present.   Plaque thickness less than 3 mm.   Mobile plaque not present.    Descending Aorta: Size normal.    Dissection not present.   Plaque thickness less than 3 mm.   Mobile plaque not present.      Right Atrium:  Size normal.   Spontaneous echo contrast not present.   Thrombus not present.   Tumor not present.   Device not present.     Left Atrium: Size normal.  Spontaneous echo contrast not present.  Thrombus not present.  Tumor not present.  Device not present.    Left atrial appendage normal.     Atrial Septum: Intra-atrial septal morphology normal.       Ventricular Septum: Intra-ventricular septum morphology normal.          Post Intervention Findings  Procedure(s) performed:  CABG. Global function:  Unchanged. Regional wall motion: Unchanged. Surgeon(s) notified of all postintervention findings: Yes.                 Echocardiogram Comments  Echocardiogram comments: LV: Normal ventricle size with normal global function, EF 55-60%.   AV: Normal leaflet morphology and function. No stenosis suspected, no regurgitation noted.  MV: Normal leaflet morphology and function. No stenosis suspected, no regurgitation noted.  RV: Normal ventricular size with normal function. EF 45%  TV: Normal leaflet morphology and function. No stenosis suspected with trace regurgitation noted.  PV:Normal leaflet morphology and function. No stenosis suspected, no regurgitation noted.  Aorta: Diameter within normal limits. No evidence of aneurysm, hematoma or dissection. Grade 2 atherosclerotic disease.  .

## 2025-07-29 NOTE — ANESTHESIA PREPROCEDURE EVALUATION
Anesthesia Pre-Procedure Evaluation    Patient: Hung Newell   MRN: [id number] : 1970          Procedure : Procedure(s):  CORONARY ARTERY BYPASS GRAFT, WITH ENDOSCOPIC VESSEL PROCUREMENT  SURGICAL PROCUREMENT, ARTERY, RADIAL       Hung Newell is a 55 year old male with PMHx significant for angina who was admitted on 2025. He will undergo CABG .     CAD, involving left main coronary artery.    History of elevated calcium score 44 in .  Unstable angina  - exertional chest pain, s/p coronary angiogram  showed severe left main disease.  - Cardiothoracic surgery consult: CABG planned for  with Dr. Leiva  - On aspirin, Crestor, Zetia     HLD  -On rosuvastatin 40 mg, Zetia 10 mg     Prediabetes- A1c 5.7  - D/W lifestyle modifications.    - RD consulted.    Past Medical History:   Diagnosis Date    Coronary artery disease 2022    coronary calcification score 44    Hyperlipidemia       Past Surgical History:   Procedure Laterality Date    CV CORONARY ANGIOGRAM N/A 2025    Procedure: Coronary Angiogram;  Surgeon: Gagan Benites MD;  Location: [facility] CATH LAB CV    CV LEFT HEART CATH N/A 2025    Procedure: Left Heart Catheterization;  Surgeon: Gagan Benites MD;  Location: [facility] CATH LAB CV    PLEURAL SCARIFICATION        Allergies   Allergen Reactions    Pollen Extract       Social History     Tobacco Use    Smoking status: Never    Smokeless tobacco: Never   Substance Use Topics    Alcohol use: Yes     Comment: Alcoholic Drinks/day: Occasional      Wt Readings from Last 1 Encounters:   25 78.9 kg (173 lb 14.4 oz)        Anesthesia Evaluation            ROS/MED HX  ENT/Pulmonary:       Neurologic:       Cardiovascular:     (+) Dyslipidemia hypertension- -  CAD -  - -                                      METS/Exercise Tolerance:     Hematologic:       Musculoskeletal:       GI/Hepatic:       Renal/Genitourinary:       Endo:       Psychiatric/Substance Use:  "      Infectious Disease:       Malignancy:       Other:              Physical Exam  Airway  Mallampati: II  TM distance: >3 FB  Neck ROM: full  Upper bite lip test: II  Mouth opening: >= 4 cm    Cardiovascular   Rhythm: regular  Rate: normal rate     Dental   (+) Minor Abnormalities - some fillings, tiny chips      Pulmonary Breath sounds clear to auscultation        Neurological   He appears awake, alert and oriented x3.    Other Findings       OUTSIDE LABS:  CBC:   Lab Results   Component Value Date    WBC 6.8 07/29/2025    WBC 5.9 07/25/2025    HGB 15.5 07/29/2025    HGB 14.8 07/26/2025    HCT 45.2 07/29/2025    HCT 45.9 07/25/2025     07/29/2025     07/25/2025     BMP:   Lab Results   Component Value Date     07/29/2025     07/26/2025    POTASSIUM 4.1 07/29/2025    POTASSIUM 4.5 07/26/2025    CHLORIDE 104 07/29/2025    CHLORIDE 103 07/26/2025    CO2 25 07/29/2025    CO2 27 07/26/2025    BUN 10.9 07/29/2025    BUN 9.3 07/26/2025    CR 0.93 07/29/2025    CR 0.99 07/26/2025     (H) 07/29/2025     (H) 07/29/2025     COAGS:   Lab Results   Component Value Date    PTT 29 07/29/2025    INR 0.93 07/28/2025    FIBR 404 07/29/2025     POC: No results found for: \"BGM\", \"HCG\", \"HCGS\"  HEPATIC: No results found for: \"ALBUMIN\", \"PROTTOTAL\", \"ALT\", \"AST\", \"GGT\", \"ALKPHOS\", \"BILITOTAL\", \"BILIDIRECT\", \"CRISTOPHER\"  OTHER:   Lab Results   Component Value Date    A1C 5.7 (H) 07/25/2025    MENA 9.4 07/29/2025    MAG 2.0 07/26/2025       Anesthesia Plan    ASA Status:  4      NPO Status: NPO Appropriate   Anesthesia Type: General.  Airway: oral.  Induction: intravenous.   Techniques and Equipment:     - Airway:   Arterial Line Kit: Radial     - Monitoring Plan: standard ASA monitoring, train of four monitoring, BROWN, pulmonary artery catheter, cerebral oximetry, arterial line kit, central line kit, processed EEG monitor     Consents    Anesthesia Plan(s) and associated risks, benefits, and realistic " "alternatives discussed. Questions answered and patient/representative(s) expressed understanding.     - Discussed: anesthesiologist     - Discussed with:  Patient        - Pt is DNR/DNI Status: no DNR     Blood Consent:      - Discussed with: patient.     - Consented: consented to blood products     Postoperative Care    Pain management: non-narcotic analgesics, plan for postoperative opioid use.     Comments:                   Christopher J. Behrens, MD    I have reviewed the pertinent notes and labs in the chart from the past 30 days and (re)examined the patient.  Any updates or changes from those notes are reflected in this note.    Clinically Significant Risk Factors                              # Overweight: Estimated body mass index is 27.24 kg/m  as calculated from the following:    Height as of this encounter: 1.702 m (5' 7\").    Weight as of this encounter: 78.9 kg (173 lb 14.4 oz)., PRESENT ON ADMISSION                  "

## 2025-07-29 NOTE — ANESTHESIA PROCEDURE NOTES
Airway       Patient location during procedure: OR       Procedure Start/Stop Times: 7/29/2025 7:29 AM  Staff -        Anesthesiologist:  Behrens, Christopher J, MD       CRNA: Marivel Ugarte APRN CRNA       Performed By: CRNAIndications and Patient Condition       Indications for airway management: enoch-procedural       Induction type:intravenous       Mask difficulty assessment: 2 - vent by mask + OA or adjuvant +/- NMBA    Final Airway Details       Final airway type: endotracheal airway       Successful airway: Single subglottic suction  Endotracheal Airway Details        ETT size (mm): 7.5       Cuffed: yes       Cuff volume (mL): 6       Successful intubation technique: direct laryngoscopy       DL Blade Type: Urias 2       Grade View of Cords: 1       Adjucts: stylet       Position: Right       Measured from: gums/teeth       Secured at (cm): 23       Bite block used: None    Post intubation assessment        Placement verified by: capnometry, equal breath sounds and chest rise        Number of attempts at approach: 1       Secured with: tape       Ease of procedure: easy       Dentition: Intact and Unchanged       Dental guard used and removed. Dental Guard Type: Standard White.    Medication(s) Administered   Medication Administration Time: 7/29/2025 7:29 AM

## 2025-07-30 ENCOUNTER — APPOINTMENT (OUTPATIENT)
Dept: RADIOLOGY | Facility: HOSPITAL | Age: 55
DRG: 234 | End: 2025-07-30
Payer: COMMERCIAL

## 2025-07-30 ENCOUNTER — APPOINTMENT (OUTPATIENT)
Dept: OCCUPATIONAL THERAPY | Facility: HOSPITAL | Age: 55
DRG: 234 | End: 2025-07-30
Attending: NURSE PRACTITIONER
Payer: COMMERCIAL

## 2025-07-30 LAB
ALBUMIN SERPL BCG-MCNC: 3.7 G/DL (ref 3.5–5.2)
ALP SERPL-CCNC: 40 U/L (ref 40–150)
ALT SERPL W P-5'-P-CCNC: 20 U/L (ref 0–70)
ANION GAP SERPL CALCULATED.3IONS-SCNC: 10 MMOL/L (ref 7–15)
AST SERPL W P-5'-P-CCNC: 39 U/L (ref 0–45)
ATRIAL RATE - MUSE: 96 BPM
BILIRUB SERPL-MCNC: 0.8 MG/DL
BUN SERPL-MCNC: 6.8 MG/DL (ref 6–20)
CA-I BLD-MCNC: 4.7 MG/DL (ref 4.4–5.2)
CALCIUM SERPL-MCNC: 8.2 MG/DL (ref 8.8–10.4)
CHLORIDE SERPL-SCNC: 108 MMOL/L (ref 98–107)
CREAT SERPL-MCNC: 0.86 MG/DL (ref 0.67–1.17)
DIASTOLIC BLOOD PRESSURE - MUSE: NORMAL MMHG
EGFRCR SERPLBLD CKD-EPI 2021: >90 ML/MIN/1.73M2
ERYTHROCYTE [DISTWIDTH] IN BLOOD BY AUTOMATED COUNT: 12.2 % (ref 10–15)
GLUCOSE BLDC GLUCOMTR-MCNC: 100 MG/DL (ref 70–99)
GLUCOSE BLDC GLUCOMTR-MCNC: 103 MG/DL (ref 70–99)
GLUCOSE BLDC GLUCOMTR-MCNC: 116 MG/DL (ref 70–99)
GLUCOSE BLDC GLUCOMTR-MCNC: 125 MG/DL (ref 70–99)
GLUCOSE BLDC GLUCOMTR-MCNC: 139 MG/DL (ref 70–99)
GLUCOSE BLDC GLUCOMTR-MCNC: 149 MG/DL (ref 70–99)
GLUCOSE BLDC GLUCOMTR-MCNC: 149 MG/DL (ref 70–99)
GLUCOSE BLDC GLUCOMTR-MCNC: 94 MG/DL (ref 70–99)
GLUCOSE BLDC GLUCOMTR-MCNC: 99 MG/DL (ref 70–99)
GLUCOSE SERPL-MCNC: 107 MG/DL (ref 70–99)
HCO3 SERPL-SCNC: 22 MMOL/L (ref 22–29)
HCT VFR BLD AUTO: 33.2 % (ref 40–53)
HGB BLD-MCNC: 11.4 G/DL (ref 13.3–17.7)
INTERPRETATION ECG - MUSE: NORMAL
MAGNESIUM SERPL-MCNC: 1.7 MG/DL (ref 1.7–2.3)
MCH RBC QN AUTO: 30.3 PG (ref 26.5–33)
MCHC RBC AUTO-ENTMCNC: 34.3 G/DL (ref 31.5–36.5)
MCV RBC AUTO: 88 FL (ref 78–100)
P AXIS - MUSE: 69 DEGREES
PHOSPHATE SERPL-MCNC: 3.8 MG/DL (ref 2.5–4.5)
PLATELET # BLD AUTO: 172 10E3/UL (ref 150–450)
POTASSIUM SERPL-SCNC: 4.3 MMOL/L (ref 3.4–5.3)
PR INTERVAL - MUSE: 142 MS
PROT SERPL-MCNC: 5.4 G/DL (ref 6.4–8.3)
QRS DURATION - MUSE: 86 MS
QT - MUSE: 332 MS
QTC - MUSE: 419 MS
R AXIS - MUSE: 70 DEGREES
RBC # BLD AUTO: 3.76 10E6/UL (ref 4.4–5.9)
SODIUM SERPL-SCNC: 140 MMOL/L (ref 135–145)
SYSTOLIC BLOOD PRESSURE - MUSE: NORMAL MMHG
T AXIS - MUSE: 61 DEGREES
VENTRICULAR RATE- MUSE: 96 BPM
WBC # BLD AUTO: 10.4 10E3/UL (ref 4–11)

## 2025-07-30 PROCEDURE — 250N000013 HC RX MED GY IP 250 OP 250 PS 637: Performed by: PHYSICIAN ASSISTANT

## 2025-07-30 PROCEDURE — 84100 ASSAY OF PHOSPHORUS: CPT

## 2025-07-30 PROCEDURE — 250N000013 HC RX MED GY IP 250 OP 250 PS 637: Performed by: INTERNAL MEDICINE

## 2025-07-30 PROCEDURE — 250N000011 HC RX IP 250 OP 636

## 2025-07-30 PROCEDURE — 82330 ASSAY OF CALCIUM: CPT

## 2025-07-30 PROCEDURE — 85041 AUTOMATED RBC COUNT: CPT

## 2025-07-30 PROCEDURE — 97110 THERAPEUTIC EXERCISES: CPT | Mod: GO

## 2025-07-30 PROCEDURE — 210N000001 HC R&B IMCU HEART CARE

## 2025-07-30 PROCEDURE — 97166 OT EVAL MOD COMPLEX 45 MIN: CPT | Mod: GO

## 2025-07-30 PROCEDURE — 83735 ASSAY OF MAGNESIUM: CPT | Performed by: THORACIC SURGERY (CARDIOTHORACIC VASCULAR SURGERY)

## 2025-07-30 PROCEDURE — 250N000013 HC RX MED GY IP 250 OP 250 PS 637: Performed by: THORACIC SURGERY (CARDIOTHORACIC VASCULAR SURGERY)

## 2025-07-30 PROCEDURE — 99233 SBSQ HOSP IP/OBS HIGH 50: CPT | Performed by: INTERNAL MEDICINE

## 2025-07-30 PROCEDURE — 999N000157 HC STATISTIC RCP TIME EA 10 MIN

## 2025-07-30 PROCEDURE — 85018 HEMOGLOBIN: CPT

## 2025-07-30 PROCEDURE — 82947 ASSAY GLUCOSE BLOOD QUANT: CPT

## 2025-07-30 PROCEDURE — 250N000013 HC RX MED GY IP 250 OP 250 PS 637

## 2025-07-30 PROCEDURE — 250N000012 HC RX MED GY IP 250 OP 636 PS 637: Performed by: PHYSICIAN ASSISTANT

## 2025-07-30 PROCEDURE — 84155 ASSAY OF PROTEIN SERUM: CPT

## 2025-07-30 PROCEDURE — 999N000156 HC STATISTIC RCP CONSULT EA 30 MIN

## 2025-07-30 PROCEDURE — 250N000011 HC RX IP 250 OP 636: Performed by: PHYSICIAN ASSISTANT

## 2025-07-30 PROCEDURE — 71045 X-RAY EXAM CHEST 1 VIEW: CPT

## 2025-07-30 PROCEDURE — 94660 CPAP INITIATION&MGMT: CPT

## 2025-07-30 PROCEDURE — 93010 ELECTROCARDIOGRAM REPORT: CPT | Performed by: STUDENT IN AN ORGANIZED HEALTH CARE EDUCATION/TRAINING PROGRAM

## 2025-07-30 PROCEDURE — 94799 UNLISTED PULMONARY SVC/PX: CPT

## 2025-07-30 PROCEDURE — 93005 ELECTROCARDIOGRAM TRACING: CPT

## 2025-07-30 PROCEDURE — 97535 SELF CARE MNGMENT TRAINING: CPT | Mod: GO

## 2025-07-30 RX ORDER — NICOTINE POLACRILEX 4 MG
15-30 LOZENGE BUCCAL
Status: ACTIVE | OUTPATIENT
Start: 2025-07-30

## 2025-07-30 RX ORDER — FUROSEMIDE 10 MG/ML
20 INJECTION INTRAMUSCULAR; INTRAVENOUS 3 TIMES DAILY
Status: DISPENSED | OUTPATIENT
Start: 2025-07-30

## 2025-07-30 RX ORDER — OXYCODONE HYDROCHLORIDE 5 MG/1
5-10 TABLET ORAL EVERY 4 HOURS PRN
Refills: 0 | Status: DISPENSED | OUTPATIENT
Start: 2025-07-30

## 2025-07-30 RX ORDER — EZETIMIBE 10 MG/1
10 TABLET ORAL AT BEDTIME
Status: DISPENSED | OUTPATIENT
Start: 2025-07-30

## 2025-07-30 RX ORDER — ROSUVASTATIN CALCIUM 40 MG/1
40 TABLET, COATED ORAL DAILY
Status: DISPENSED | OUTPATIENT
Start: 2025-07-30

## 2025-07-30 RX ORDER — MAGNESIUM OXIDE 400 MG/1
400 TABLET ORAL EVERY 4 HOURS
Status: COMPLETED | OUTPATIENT
Start: 2025-07-30 | End: 2025-07-30

## 2025-07-30 RX ORDER — DEXTROSE MONOHYDRATE 25 G/50ML
25-50 INJECTION, SOLUTION INTRAVENOUS
Status: ACTIVE | OUTPATIENT
Start: 2025-07-30

## 2025-07-30 RX ORDER — AMLODIPINE BESYLATE 2.5 MG/1
2.5 TABLET ORAL DAILY
Status: DISPENSED | OUTPATIENT
Start: 2025-07-30

## 2025-07-30 RX ORDER — OXYCODONE HYDROCHLORIDE 5 MG/1
5-10 TABLET ORAL EVERY 4 HOURS PRN
Refills: 0 | Status: DISCONTINUED | OUTPATIENT
Start: 2025-07-30 | End: 2025-07-30

## 2025-07-30 RX ADMIN — HEPARIN SODIUM 5000 UNITS: 10000 INJECTION, SOLUTION INTRAVENOUS; SUBCUTANEOUS at 21:06

## 2025-07-30 RX ADMIN — AMLODIPINE BESYLATE 2.5 MG: 2.5 TABLET ORAL at 08:05

## 2025-07-30 RX ADMIN — FUROSEMIDE 20 MG: 10 INJECTION, SOLUTION INTRAMUSCULAR; INTRAVENOUS at 08:05

## 2025-07-30 RX ADMIN — PANTOPRAZOLE SODIUM 40 MG: 40 TABLET, DELAYED RELEASE ORAL at 08:05

## 2025-07-30 RX ADMIN — INSULIN ASPART 1 UNITS: 100 INJECTION, SOLUTION INTRAVENOUS; SUBCUTANEOUS at 11:15

## 2025-07-30 RX ADMIN — ACETAMINOPHEN 975 MG: 325 TABLET, FILM COATED ORAL at 06:10

## 2025-07-30 RX ADMIN — CEFAZOLIN 1 G: 1 INJECTION, POWDER, FOR SOLUTION INTRAMUSCULAR; INTRAVENOUS at 03:06

## 2025-07-30 RX ADMIN — CEFAZOLIN 1 G: 1 INJECTION, POWDER, FOR SOLUTION INTRAMUSCULAR; INTRAVENOUS at 10:55

## 2025-07-30 RX ADMIN — HEPARIN SODIUM 5000 UNITS: 10000 INJECTION, SOLUTION INTRAVENOUS; SUBCUTANEOUS at 11:09

## 2025-07-30 RX ADMIN — SENNOSIDES AND DOCUSATE SODIUM 1 TABLET: 50; 8.6 TABLET ORAL at 21:05

## 2025-07-30 RX ADMIN — LIDOCAINE 1 PATCH: 4 PATCH TOPICAL at 14:27

## 2025-07-30 RX ADMIN — ASPIRIN 81 MG CHEWABLE TABLET 162 MG: 81 TABLET CHEWABLE at 08:04

## 2025-07-30 RX ADMIN — OXYCODONE HYDROCHLORIDE 5 MG: 5 TABLET ORAL at 08:04

## 2025-07-30 RX ADMIN — MAGNESIUM OXIDE TAB 400 MG (241.3 MG ELEMENTAL MG) 400 MG: 400 (241.3 MG) TAB at 08:05

## 2025-07-30 RX ADMIN — SENNOSIDES AND DOCUSATE SODIUM 1 TABLET: 50; 8.6 TABLET ORAL at 08:05

## 2025-07-30 RX ADMIN — METOPROLOL TARTRATE 12.5 MG: 25 TABLET, FILM COATED ORAL at 11:44

## 2025-07-30 RX ADMIN — HYDROMORPHONE HYDROCHLORIDE 2 MG: 2 TABLET ORAL at 02:13

## 2025-07-30 RX ADMIN — ROSUVASTATIN 40 MG: 40 TABLET, FILM COATED ORAL at 10:28

## 2025-07-30 RX ADMIN — POLYETHYLENE GLYCOL 3350 17 G: 17 POWDER, FOR SOLUTION ORAL at 08:03

## 2025-07-30 RX ADMIN — HYDROMORPHONE HYDROCHLORIDE 2 MG: 2 TABLET ORAL at 04:12

## 2025-07-30 RX ADMIN — ACETAMINOPHEN 975 MG: 325 TABLET, FILM COATED ORAL at 21:05

## 2025-07-30 RX ADMIN — INSULIN ASPART 1 UNITS: 100 INJECTION, SOLUTION INTRAVENOUS; SUBCUTANEOUS at 16:22

## 2025-07-30 RX ADMIN — ACETAMINOPHEN 975 MG: 325 TABLET, FILM COATED ORAL at 14:26

## 2025-07-30 RX ADMIN — METOPROLOL TARTRATE 12.5 MG: 25 TABLET, FILM COATED ORAL at 21:11

## 2025-07-30 RX ADMIN — OXYCODONE HYDROCHLORIDE 5 MG: 5 TABLET ORAL at 10:59

## 2025-07-30 RX ADMIN — FUROSEMIDE 20 MG: 10 INJECTION, SOLUTION INTRAMUSCULAR; INTRAVENOUS at 17:55

## 2025-07-30 RX ADMIN — EZETIMIBE 10 MG: 10 TABLET ORAL at 21:05

## 2025-07-30 RX ADMIN — OXYCODONE HYDROCHLORIDE 5 MG: 5 TABLET ORAL at 21:06

## 2025-07-30 RX ADMIN — MAGNESIUM OXIDE TAB 400 MG (241.3 MG ELEMENTAL MG) 400 MG: 400 (241.3 MG) TAB at 06:06

## 2025-07-30 RX ADMIN — FUROSEMIDE 20 MG: 10 INJECTION, SOLUTION INTRAMUSCULAR; INTRAVENOUS at 11:12

## 2025-07-30 ASSESSMENT — ACTIVITIES OF DAILY LIVING (ADL)
ADLS_ACUITY_SCORE: 39
ADLS_ACUITY_SCORE: 38
ADLS_ACUITY_SCORE: 38
ADLS_ACUITY_SCORE: 39
DEPENDENT_IADLS:: INDEPENDENT
ADLS_ACUITY_SCORE: 37
ADLS_ACUITY_SCORE: 39
ADLS_ACUITY_SCORE: 38
ADLS_ACUITY_SCORE: 37
ADLS_ACUITY_SCORE: 38
ADLS_ACUITY_SCORE: 39
ADLS_ACUITY_SCORE: 39
ADLS_ACUITY_SCORE: 38
ADLS_ACUITY_SCORE: 38
ADLS_ACUITY_SCORE: 37
ADLS_ACUITY_SCORE: 37
ADLS_ACUITY_SCORE: 38
ADLS_ACUITY_SCORE: 37
ADLS_ACUITY_SCORE: 38
ADLS_ACUITY_SCORE: 38
IADL_COMMENTS: IND. W/ IADL TASKS
ADLS_ACUITY_SCORE: 39
ADLS_ACUITY_SCORE: 37

## 2025-07-30 NOTE — PROGRESS NOTES
Pt does not use cpap at home, pt feels he may have CHASE. No testing has been done.   Discussed with pt that he is being monitored and if cpap is needed will start.

## 2025-07-30 NOTE — PROGRESS NOTES
"[facility]    Medicine Progress Note - Hospitalist Service    Date of Admission:  7/25/2025    Assessment & Plan   Hung Newell is a 55 year old male with PMHx significant for angina who was admitted on 7/25/2025. He will undergo CABG 7/29.    CAD s/p CABG  History of elevated calcium score 44 in 2022.  Unstable angina  - exertional chest pain, s/p coronary angiogram 7/25 showed severe left main disease.  - Cardiothoracic surgery consult: CABG x2 7/29 with Dr. Leiva  - On aspirin, Crestor, Zetia  - Postoperative management per CT surgery    HLD  -On rosuvastatin 40 mg, Zetia 10 mg    Prediabetes- A1c 5.7  - D/W lifestyle modifications.    - RD consulted.          Diet: Combination Diet Regular Diet; 2 gm Sodium Diet; Low Saturated Fat Sodium <2400mg Diet    DVT Prophylaxis: Pneumatic Compression Devices  Quinn Catheter: PRESENT, indication: ICU only: hourly urine output needed for patient care  Lines: None     Cardiac Monitoring: None  Code Status: Full Code      Clinically Significant Risk Factors          # Hyperchloremia: Highest Cl = 108 mmol/L in last 2 days, will monitor as appropriate      # Hypocalcemia: Lowest Ca = 8.2 mg/dL in last 2 days, will monitor and replace as appropriate      # Coagulation Defect: INR = 1.31 (Ref range: 0.85 - 1.15) and/or PTT = 33 Seconds (Ref range: 22 - 38 Seconds), will monitor for bleeding               # Overweight: Estimated body mass index is 28.08 kg/m  as calculated from the following:    Height as of this encounter: 1.702 m (5' 7\").    Weight as of this encounter: 81.3 kg (179 lb 4.8 oz).       # History of CABG: noted on surgical history       Social Drivers of Health     [facility]    Social Connections          Disposition Plan     Medically Ready for Discharge: Anticipated in 2-4 Days             Allison Frazier MD  Hospitalist Service  [facility]  Securely " message with [employer] (more info)  Text page via [facility] Paging/Directory   ______________________________________________________________________    Interval History   Doing well today.  Was weaned from the ventilator and pressors yesterday.  Has been up with nursing.  Continues to have chest tube in place.  Denies chest pain but admits to some tightness in his lungs with breathing.  Denies shortness of breath, abdominal pain, any leg pain.  On nasal cannula for oxygen.  He has bruising along the left arm where harvesting was done.  Denies any bowel movement or gas.  Family at bedside.  Will likely be able to go home with outpatient cardiac rehab at discharge or at least that is the goal.    Physical Exam   Vital Signs: Temp: 98.9  F (37.2  C) Temp src: Oral BP: 110/73 Pulse: 81   Resp: 20 SpO2: 94 % O2 Device: None (Room air) Oxygen Delivery: 1 LPM  Weight: 179 lbs 4.8 oz    General Appearance: Awake, alert, in no acute distress  Respiratory: CTAB, no wheeze  Cardiovascular: RRR, no murmur noted  GI: soft, nontender, non distended, normal bowel sounds  Skin: no jaundice, no rash, leg surgical scar, bruising and swelling along left forearm      Medical Decision Making       50 MINUTES SPENT BY ME on the date of service doing chart review, history, exam, documentation & further activities per the note.      Data     I have personally reviewed the following data over the past 24 hrs:    10.4  \   11.4 (L)   / 172     140 108 (H) 6.8 /  149 (H)   4.3 22 0.86 \     ALT: 20 AST: 39 AP: 40 TBILI: 0.8   ALB: 3.7 TOT PROTEIN: 5.4 (L) LIPASE: N/A     Procal: N/A CRP: N/A Lactic Acid: 1.4         Imaging results reviewed over the past 24 hrs:   Recent Results (from the past 24 hours)   XR Chest Port 1 View    Narrative    EXAM: XR CHEST PORTABLE 1 VIEW  LOCATION: [facility]  DATE: 07/30/2025    INDICATION: Postop CVTS surgery.  COMPARISON: Chest x-ray from yesterday.      Impression    IMPRESSION:   1.   Left-sided chest tube with likely trace left apical pneumothorax, unchanged.  2.  Right IJ central catheter tip in mid SVC.  3.  CABG. Mediastinal drains.  4.  Clear lungs with small pleural effusions.

## 2025-07-30 NOTE — PROGRESS NOTES
Pulmonary/Critical Care  7/30/2025   7:07 AM       Chart reviewed.   Stable night in ICU; progressing as expected.   Continue post extubation volume expansion therapy with EZPAP/VersaPAP, flutter, activity, IS.     Our service will sign off.     Aurelia Alvarez CNP  [facility] Pulmonary/Critical Care

## 2025-07-30 NOTE — PROGRESS NOTES
"CVTS Daily Progress Note   POD#1 s/p CABGx2 (left internal mammary artery to the left anterior descending coronary artery/ left radial artery to the obtuse marginal coronary artery)  Attending: Abbie  LOS: 5    SUBJECTIVE/INTERVAL EVENTS:    Patient arrived to ICU from OR yesterday afternoon. He was subsequently extubated and weaned from pressors. Remains on nicardipine for radial artery graft protection; normotensive. NSR. CI 2.7. Patient progressing overall very well. Maintaining oxygen saturations on nasal cannula. Up to chair this AM. Pain well controlled. - BM / flatus. Tolerating limited diet without nausea. UOP adequate. Chest tube output appropriate--air leak earlier per nursing although not noticeable now. Hgb 11.4. Patient denies new chest pain, shortness of breath, abdominal pain, calf pain, nausea. Patient has no questions today.     OBJECTIVE:  Temp:  [97.4  F (36.3  C)-100.5  F (38.1  C)] 98.9  F (37.2  C)  Pulse:  [] 82  Resp:  [16-26] 18  MAP:  [57 mmHg-92 mmHg] 89 mmHg  Arterial Line BP: ()/() 121/73  FiO2 (%):  [32 %-60 %] 32 %  SpO2:  [90 %-100 %] 95 %  Vitals:    07/26/25 0446 07/27/25 0435 07/29/25 0424 07/29/25 0509   Weight: 78.8 kg (173 lb 11.2 oz) 78.3 kg (172 lb 9.6 oz) 78.3 kg (172 lb 9.6 oz) 78.9 kg (173 lb 14.4 oz)    07/30/25 0630   Weight: 81.3 kg (179 lb 4.8 oz)       Clinically Significant Risk Factors          # Hyperchloremia: Highest Cl = 108 mmol/L in last 2 days, will monitor as appropriate      # Hypocalcemia: Lowest Ca = 8.2 mg/dL in last 2 days, will monitor and replace as appropriate      # Coagulation Defect: INR = 1.31 (Ref range: 0.85 - 1.15) and/or PTT = 33 Seconds (Ref range: 22 - 38 Seconds), will monitor for bleeding               # Overweight: Estimated body mass index is 28.08 kg/m  as calculated from the following:    Height as of this encounter: 1.702 m (5' 7\").    Weight as of this encounter: 81.3 kg (179 lb 4.8 oz).                  Current " Medications:    Scheduled Meds:  Current Facility-Administered Medications   Medication Dose Route Frequency Provider Last Rate Last Admin    acetaminophen (TYLENOL) tablet 975 mg  975 mg Oral Q8H [facility] Ivet Mae PA-C   975 mg at 07/30/25 0610    amLODIPine (NORVASC) tablet 2.5 mg  2.5 mg Oral Daily Arcelia Baltazar PA-C   2.5 mg at 07/30/25 0805    aspirin (ASA) chewable tablet 162 mg  162 mg Oral or NG Tube Daily Ivet Mae PA-C   162 mg at 07/30/25 0804    Or    aspirin (ASA) Suppository 300 mg  300 mg Rectal Daily Ivet Mae PA-C        ceFAZolin (ANCEF) 1 g vial to attach to  ml bag for ADULT or 50 ml bag for PEDS  1 g Intravenous Q8H Ivet Mae PA-C   1 g at 07/30/25 0306    furosemide (LASIX) injection 20 mg  20 mg Intravenous TID Arcelia Baltazar PA-C   20 mg at 07/30/25 0805    heparin ANTICOAGULANT injection 5,000 Units  5,000 Units Subcutaneous Q8H Ivet Mae PA-C        insulin aspart (NovoLOG) injection (RAPID ACTING)  1-7 Units Subcutaneous TID AC Arceila Baltazar PA-C        insulin aspart (NovoLOG) injection (RAPID ACTING)  1-5 Units Subcutaneous At Bedtime Arcelia Baltazar PA-C        Lidocaine (LIDOCARE) 4 % Patch 1-2 patch  1-2 patch Transdermal Q24H Ivet Mae PA-C   1 patch at 07/29/25 1309    metoprolol tartrate (LOPRESSOR) half-tab 12.5 mg  12.5 mg Oral BID Arcelia Baltazar PA-C        pantoprazole (PROTONIX) 2 mg/mL suspension 40 mg  40 mg Oral or NG Tube QAM  Ivet Mae PA-C        Or    pantoprazole (PROTONIX) EC tablet 40 mg  40 mg Oral QAM  Ivet Mae PA-C   40 mg at 07/30/25 0805    polyethylene glycol (MIRALAX) Packet 17 g  17 g Oral Daily Ivet Mae PA-C   17 g at 07/30/25 0803    senna-docusate (SENOKOT-S/PERICOLACE) 8.6-50 MG per tablet 1 tablet  1 tablet Oral BID Ivet Mae PA-C   1 tablet at 07/30/25 0805     Continuous  Infusions:  Current Facility-Administered Medications   Medication Dose Route Frequency Provider Last Rate Last Admin    niCARdipine 40 mg in 200 mL NS (CARDENE) infusion  0.5-15 mg/hr Intravenous Continuous PRN Arcelia Baltazar PA-C 2.5 mL/hr at 07/30/25 0600 0.5 mg/hr at 07/30/25 0600     PRN Meds:.  Current Facility-Administered Medications   Medication Dose Route Frequency Provider Last Rate Last Admin    [START ON 8/1/2025] bisacodyl (DULCOLAX) suppository 10 mg  10 mg Rectal Daily PRN Ivet Mae PA-C        calcium gluconate 1 g in 50 mL in sodium chloride intermittent infusion  1 g Intravenous Once PRN Ivet Mae PA-C        calcium gluconate 2 g in  mL intermittent infusion  2 g Intravenous Once PRN Ivet Mae PA-C   2 g at 07/29/25 1319    calcium gluconate 3 g in sodium chloride 0.9 % 100 mL intermittent infusion  3 g Intravenous Once PRN Ivet Mae PA-C        glucose gel 15-30 g  15-30 g Oral Q15 Min PRN Arcelia Baltazar PA-C        Or    dextrose 50 % injection 25-50 mL  25-50 mL Intravenous Q15 Min PRN Arcelia Baltazar PA-C        Or    glucagon injection 1 mg  1 mg Subcutaneous Q15 Min PRN Arcelia Baltazar PA-C        hydrALAZINE (APRESOLINE) injection 10 mg  10 mg Intravenous Q30 Min PRN Ivet Mae PA-C        HYDROmorphone (DILAUDID) injection 0.2 mg  0.2 mg Intravenous Q2H PRN Ivet Mae PA-C   0.2 mg at 07/29/25 1739    Or    HYDROmorphone (DILAUDID) injection 0.4 mg  0.4 mg Intravenous Q2H PRN Ivet Mae PA-C        lactated ringers BOLUS 250 mL  250 mL Intravenous Q15 Min PRN Ivet Mae PA-C   250 mL at 07/29/25 1552    [START ON 7/31/2025] magnesium hydroxide (MILK OF MAGNESIA) suspension 30 mL  30 mL Oral Daily PRN Ivet Mae PA-C        naloxone (NARCAN) injection 0.2 mg  0.2 mg Intravenous Q2 Min PRN Daniella Leiva MD        Or    naloxone (NARCAN)  injection 0.4 mg  0.4 mg Intravenous Q2 Min PRN Daniella Leiva MD        Or    naloxone (NARCAN) injection 0.2 mg  0.2 mg Intramuscular Q2 Min PRN Daniella Leiva MD        Or    naloxone (NARCAN) injection 0.4 mg  0.4 mg Intramuscular Q2 Min PRN Daniella Leiva MD        niCARdipine 40 mg in 200 mL NS (CARDENE) infusion  0.5-15 mg/hr Intravenous Continuous PRN Arcelia Baltazar PA-C 2.5 mL/hr at 07/30/25 0600 0.5 mg/hr at 07/30/25 0600    ondansetron (ZOFRAN ODT) ODT tab 4 mg  4 mg Oral Q6H PRN Ivet Mae PA-C        Or    ondansetron (ZOFRAN) injection 4 mg  4 mg Intravenous Q6H PRN Ivet Mae PA-C        oxyCODONE (ROXICODONE) tablet 5-10 mg  5-10 mg Oral Q4H PRN Allison Frazier MD   5 mg at 07/30/25 0804    prochlorperazine (COMPAZINE) injection 10 mg  10 mg Intravenous Q6H PRN Ivet Mae PA-C        Or    prochlorperazine (COMPAZINE) tablet 10 mg  10 mg Oral Q6H PRN Ivet Mae PA-C           Cardiographics:    Telemetry monitoring demonstrates NSR with rates in the 90s per my personal review.    Imaging:  Results for orders placed or performed during the hospital encounter of 07/25/25   XR Chest 2 Views    Impression    IMPRESSION:   Mild blunting of the left costophrenic sulcus on the lateral view may represent a tiny pleural effusion. The chest is otherwise negative.   US Carotid Bilateral    Impression    IMPRESSION:  1.  Mild plaque formation, velocities consistent with less than 50% stenosis in the right internal carotid artery.  2.  Mild plaque formation, velocities consistent with less than 50% stenosis in the left internal carotid artery.  3.  Flow within the vertebral arteries is antegrade.   US Lower Extremity Venous Mapping Bilateral    Impression    IMPRESSION:  1. Bilateral lower extremity venous mapping.   XR Chest Port 1 View    Impression    IMPRESSION: Interval sternotomy. Endotracheal tube is located 2.9 cm above the arturo.  Right IJ central catheter in the lower SVC. Mediastinal drains and left chest tube. Questionable tiny left apical pneumothorax with 2 mm pleural separation. Low lung   volumes. Mild bibasilar atelectasis. No definite pleural fluid. Normal heart size. No vascular congestion.   XR Chest Port 1 View    Impression    IMPRESSION:   1.  Left-sided chest tube with likely trace left apical pneumothorax, unchanged.  2.  Right IJ central catheter tip in mid SVC.  3.  CABG. Mediastinal drains.  4.  Clear lungs with small pleural effusions.     Echocardiogram Complete   Result Value Ref Range    LVEF  60-65%        Labs, personally reviewed.  Hemoglobin   Date Value Ref Range Status   07/30/2025 11.4 (L) 13.3 - 17.7 g/dL Final   07/29/2025 12.4 (L) 13.3 - 17.7 g/dL Final   07/29/2025 12.4 (L) 13.3 - 17.7 g/dL Final     Hemoglobin POCT   Date Value Ref Range Status   07/29/2025 11.9 (L) 13.3 - 17.7 g/dL Final   07/29/2025 12.6 (L) 13.3 - 17.7 g/dL Final   07/29/2025 12.3 (L) 13.3 - 17.7 g/dL Final     WBC Count   Date Value Ref Range Status   07/30/2025 10.4 4.0 - 11.0 10e3/uL Final   07/29/2025 9.9 4.0 - 11.0 10e3/uL Final   07/29/2025 14.4 (H) 4.0 - 11.0 10e3/uL Final     Platelet Count   Date Value Ref Range Status   07/30/2025 172 150 - 450 10e3/uL Final   07/29/2025 139 (L) 150 - 450 10e3/uL Final   07/29/2025 163 150 - 450 10e3/uL Final     Creatinine   Date Value Ref Range Status   07/30/2025 0.86 0.67 - 1.17 mg/dL Final   07/29/2025 0.88 0.67 - 1.17 mg/dL Final   07/29/2025 0.93 0.67 - 1.17 mg/dL Final     Potassium   Date Value Ref Range Status   07/30/2025 4.3 3.4 - 5.3 mmol/L Final   07/29/2025 3.8 3.4 - 5.3 mmol/L Final   07/29/2025 3.8 3.4 - 5.3 mmol/L Final     Potassium POCT   Date Value Ref Range Status   07/29/2025 3.5 3.4 - 5.3 mmol/L Final   07/29/2025 4.3 3.4 - 5.3 mmol/L Final   07/29/2025 5.2 3.4 - 5.3 mmol/L Final     Magnesium   Date Value Ref Range Status   07/30/2025 1.7 1.7 - 2.3 mg/dL Final    07/29/2025 2.3 1.7 - 2.3 mg/dL Final   07/26/2025 2.0 1.7 - 2.3 mg/dL Final          I/O:  I/O last 3 completed shifts:  In: 5736.26 [P.O.:560; I.V.:3156.26; Other:370; IV Piggyback:650]  Out: 3297 [Urine:2237; Blood:250; Chest Tube:810]       Physical Exam:    General: Patient seen up in chair. NAD. Conversant. Pleasant  CV: RRR on monitor. 2+ peripheral pulses in all extremities. Mild edema. Incision C/D/I.  Pulm: Non-labored effort on nasal cannula. Chest tubes in place, serosanguinous output, no appreciable air leak.  Abd: Soft, NT, ND  : Quinn with fara urine  Ext: Mild pedal edema, SCDs in place, warm, distal pulses intact  Neuro: CNs grossly intact      ASSESSMENT/PLAN:    Hung Newell is a 55 year old male with a history of CAD who is s/p CABG.    Principal Problem:    CAD (coronary artery disease)  Active Problems:    Status post coronary angiogram    Hypercholesterolemia    Pre-diabetes    CAD in native artery        NEURO:   - Scheduled Tylenol/lidocaine patches and PRN Tylenol/oxycodone/dilaudid for pain    CV:   - Pre-op EF 60-65%  - Normotensive off pressors  - Amlodipine 2.5mg daily (radial artery graft protection--can turn off nicardipine 2 hours after admin  - Metoprolol 12.5mg two times a day if tolerating CCB  - ASA 162mg daily  - Rosuvastatin 40mg daily  - Zetia 10mg daily  - Chest tubes to remain today--check air leak     PULM:   - Extubated on POD#0  - Maintaining oxygen saturations on nasal cannula  - Encourage pulmonary toilet    FEN/GI:  - Continue electrolyte replacement protocol  - Cardiac; ADAT  - Bowel regimen    RENAL:  - Adequate UOP/hr. Continue to monitor closely.  - Cr 0.86  - Quinn to remain in for close monitoring of I/O and during period of diuresis/relative immobility  - Diuresis with 20mg IV Lasix TID    HEME:  - Acute blood loss anemia post-op.   - Hgb stable, no bleeding concerns. Hep SQ, ASA    ID:  - Nubia op ppx complete, afebrile. No concerns for  infection    ENDO:   - Transition to sliding scale insulin    PPx:   - DVT: SCDs, SQ heparin TID, ambulation   - GI: Protonix 40mg IV/PO daily    DISPO:   - Transfer to general telemetry status  - Medically Ready for Discharge: Anticipated in 5+ Days         Patient discussed with Dr. Leiva.        _______  Arcelia Baltazar PA-C  [facility] Cardiothoracic Surgery  Communication via Vocera Secure Messaging

## 2025-07-30 NOTE — CONSULTS
Care Management Initial Consult    General Information  Assessment completed with: Patient,    Type of CM/SW Visit: Initial Assessment    Primary Care Provider verified and updated as needed:  (no pcp, planning to establish with pcp post hospital)   Readmission within the last 30 days: no previous admission in last 30 days         Advance Care Planning: Advance Care Planning Reviewed:  (no HCD)          Communication Assessment  Patient's communication style: spoken language (English or Bilingual)    Hearing Difficulty or Deaf: no   Wear Glasses or Blind: no    Cognitive  Cognitive/Neuro/Behavioral: .WDL except  Level of Consciousness: alert  Arousal Level: opens eyes spontaneously  Orientation: oriented x 4  Mood/Behavior: calm, cooperative  Best Language: 0 - No aphasia  Speech: clear    Living Environment:   People in home: spouse  wife Amy  Current living Arrangements: house      Able to return to prior arrangements: yes       Family/Social Support:  Care provided by: self  Provides care for: no one  Marital Status:   Support system: Wife          Description of Support System: Supportive, Involved         Current Resources:   Patient receiving home care services: No        Community Resources: None  Equipment currently used at home: none  Supplies currently used at home: None    Employment/Financial:  Employment Status: employed full-time        Financial Concerns:             Does the patient's insurance plan have a 3 day qualifying hospital stay waiver?  No    Lifestyle & Psychosocial Needs:  Social Drivers of Health     Food Insecurity: Low Risk  (7/26/2025)    Food Insecurity     Within the past 12 months, did you worry that your food would run out before you got money to buy more?: No     Within the past 12 months, did the food you bought just not last and you didn t have money to get more?: No   Depression: Not at risk (2/27/2025)    PHQ-2     PHQ-2 Score: 0   Housing Stability: Low Risk   (7/26/2025)    Housing Stability     Do you have housing? : Yes     Are you worried about losing your housing?: No   Tobacco Use: Low Risk  (7/25/2025)    Patient History     Smoking Tobacco Use: Never     Smokeless Tobacco Use: Never     Passive Exposure: Not on file   Financial Resource Strain: Low Risk  (7/26/2025)    Financial Resource Strain     Within the past 12 months, have you or your family members you live with been unable to get utilities (heat, electricity) when it was really needed?: No   Alcohol Use: Not on file   Transportation Needs: Low Risk  (7/26/2025)    Transportation Needs     Within the past 12 months, has lack of transportation kept you from medical appointments, getting your medicines, non-medical meetings or appointments, work, or from getting things that you need?: No   Physical Activity: Not on file   Interpersonal Safety: Low Risk  (7/25/2025)    Interpersonal Safety     Do you feel physically and emotionally safe where you currently live?: Yes     Within the past 12 months, have you been hit, slapped, kicked or otherwise physically hurt by someone?: No     Within the past 12 months, have you been humiliated or emotionally abused in other ways by your partner or ex-partner?: No   Stress: Not on file   Social Connections: Unknown (1/1/2022)    Received from [facility]    Social Connections     Frequency of Communication with Friends and Family: Not on file   Health Literacy: Not on file       Functional Status:  Prior to admission patient needed assistance:   Dependent ADLs:: Independent  Dependent IADLs:: Independent       Mental Health Status:          Chemical Dependency Status:                Values/Beliefs:  Spiritual, Cultural Beliefs, Hoahaoism Practices, Values that affect care:                 Discussed  Partnership in Safe Discharge Planning  document with patient/family: No    Additional Information:    Assessment completed with patient.  Spouse present at bedside. Patient reports he lives in his house with spouse. He is independent with ADLs/IADLs, ambulated without devices and works full time. Spouse Amy is primary family contact and willing to transport at discharge.    Patient and spouse state discharge goal is to return home and attend outpatient cardiac rehab. Spouse endorses ability to care for patient post discharge.      Next Steps:     Follow for progression and recommendations.      Regine Vital RN

## 2025-07-30 NOTE — PROGRESS NOTES
07/30/25 1000   Appointment Info   Signing Clinician's Name / Credentials (OT) Gloria Davis MARIO OTR/L CLT   Living Environment   People in Home spouse   Current Living Arrangements house   Home Accessibility stairs to enter home   Number of Stairs, Main Entrance 4   Transportation Anticipated family or friend will provide   Living Environment Comments able to stay on the main level   Self-Care   Usual Activity Tolerance good   Current Activity Tolerance moderate   Regular Exercise No   Equipment Currently Used at Home none   Fall history within last six months no   Activity/Exercise/Self-Care Comment Ind. w/ ADL routine,Active- plays hockey, strength trains   Instrumental Activities of Daily Living (IADL)   IADL Comments Ind. w/ IADL tasks   General Information   Onset of Illness/Injury or Date of Surgery 07/25/25   Referring Physician    Patient/Family Therapy Goal Statement (OT) d/c home   Existing Precautions/Restrictions cardiac;sternal   Cognitive Status Examination   Orientation Status orientation to person, place and time   Affect/Mental Status (Cognitive) WNL   Follows Commands WNL   Range of Motion Comprehensive   Comment, General Range of Motion NT d/t sternal prec.   Bed Mobility   Comment (Bed Mobility) w/ Log roll tech d/t sternal prec.   Transfers   Transfers sit-stand transfer   Sit-Stand Transfer   Sit-Stand [place] (Transfers) supervision   Activities of Daily Living   BADL Assessment/Intervention no deficits identified   Clinical Impression   Criteria for Skilled Therapeutic Interventions Met (OT) Yes, treatment indicated   OT Diagnosis decreased act. tolerance   Influenced by the following impairments s/p CABG   OT Problem List-Impairments impacting ADL problems related to;activity tolerance impaired;mobility;strength;post-surgical precautions   Assessment of Occupational Performance 3-5 Performance Deficits   Identified Performance Deficits trnf safety, act.  tolerance/endurance, cardiovascular response to exercise, ADL ind   Planned Therapy Interventions (OT) ADL retraining;balance training;strengthening;transfer training;home program guidelines;progressive activity/exercise   Clinical Decision Making Complexity (OT) detailed assessment/moderate complexity   Risk & Benefits of therapy have been explained evaluation/treatment results reviewed;risks/benefits reviewed;patient   OT Total Evaluation Time   OT Eval, Moderate Complexity Minutes (34073) 8   OT Goals   Therapy Frequency (OT) 2 times/day   OT Goals Cardiac Phase 1   OT: Understanding of cardiac education to maximize quality of life, condition management, and health outcomes Patient;Demonstrate;Verbalize   OT: Perform aerobic activity with stable cardiovascular response continuous;15 minutes;ambulation   OT: Functional/aerobic ambulation tolerance with stable cardiovascular response in order to return to home and community environment Modified independent;Greater than 300 feet   OT: Navigation of stairs simulating home set up with stable cardiovascular response in order to return to home and community environment Modified independent;Greater than 10 stairs   Interventions   Interventions Quick Adds Self-Care/Home Management;Therapeutic Procedures/Exercise;Cardiac Rehab   Self-Care/Home Management   Self-Care/Home Mgmt/ADL, Compensatory, Meal Prep Minutes (70413) 8   Treatment Detail/Skilled Intervention Education on sternal precautions and inpt CR. STS from chair x 2 reps with SBA, CGA. chair to bed trsf- CGA .Bed trsf- SBA. Sit/sup- min A with step by step cues   Therapeutic Procedures/Exercise   Therapeutic Procedure: strength, endurance, ROM, flexibillity minutes (85181) 8   Treatment Detail/Skilled Intervention seated LE exercise - 4 sets x 10 reps. Static standing trial ~3 minutes with SBA/CGA. Tolerated well. BPs stable throughout.   Cardiac Education   Education Provided Precautions   OT Discharge Planning    OT Plan amb as able, stairs (4), ed, Would like to do outpt in [place]   OT Discharge Recommendation (DC Rec) home with outpatient cardiac rehab;home with assist   OT Rationale for DC Rec Anticipate patient will progress to return home with spouse and outpt CR   OT Brief overview of current status CGA   OT Total Distance Amb During Session (feet) 3   Total Session Time   Timed Code Treatment Minutes 16   Total Session Time (sum of timed and untimed services) 24

## 2025-07-30 NOTE — PLAN OF CARE
Goal Outcome Evaluation:      Plan of Care Reviewed With: patient, spouse    Overall Patient Progress: improvingOverall Patient Progress: improving    Outcome Evaluation: Updated on POC    [facility] - ICU    RN Progress Note:            Pertinent Assessments:      Please refer to flowsheet rows for full assessment     Pt arrived to ICU zat 1230 pm. Extubated at 1650. Received Albumin 5% 500cc and  ml bolus x2. Gave Calcium 2 grams and Potassium 20 meq. Pain 3/10, controlled with Dilaudid IV and PO. On Epi 0.04 and Eliseo 0.5. 6L NC to maintain sats in the low 90's. RT will evaluate for CPAP/Bipap when sleeping            Key Events - This Shift:            RN Managed Protocols Ordered:  Yes  Protocols:Potassium, Magnesium, and Phosphorus  PRN'S:iCal  Protocols Status: Reviewed with Oncoming RN                Barriers to Discharge / Downgrade:     CABG POD 0         Point of Contact Update: YES-OR-NO: Yes  Name:Amy  Phone Number:[phone]  Summary of Conversation: Updated on POC

## 2025-07-30 NOTE — PLAN OF CARE
[facility] - ICU    RN Progress Note:            Pertinent Assessments:      Please refer to flowsheet rows for full assessment     Alert and orientated x4, CMS intact.  Heart rate NSR with pericardial rub.  Lung sound clear to diminished.  Compliant with breathing exercises, IS up to 1500.  I am not hearing bowel sounds at this time.  Urine output at 80 ml/hr and chest tube output at 35 ml/hr.      Discussed need to take it slow with oral intake.  Patient has been very compliant.  Later in shift bowel sounds were normal/active.  I told patient was okay to slowly increased oral intake.  But also discussed need to back off if he started having abdominal discomfort or nausea.  Felt a little discomfort after having some cranberry juice, continue to take it slow with PO intake.    Amy (wife) was updated at end of shift.  She plans on visiting around 8 AM.           Key Events - This Shift:       Patient was on oxygen at 6 liter on nasal canula.  Able to use IS appropriately and worked with RT.  I was able to wean oxygen down to 1 liter.  Patient is concern that he might have sleep apnea, discussed he is being monitored.      Patient moves very well and was able to stand bed side with minimal difficulty.  Did experience some dizziness with blood pressure drop.  Temporarily had to go up on pressor support.  Following stand, patient developed a small intermittent air leak from chest tubes.  Checked all chest tube connection.       RN Managed Protocols Ordered:  Yes  Protocols:Potassium, Magnesium, and Phosphorus  PRN'S:iCal  Protocols Status: Laboratory Results Pending                Barriers to Discharge / Downgrade:     Requiring IV pressor support at this time and Cardene infusion at this time.     Nicolas Gregg RN

## 2025-07-30 NOTE — CONSULTS
NUTRITION BRIEF    Consult: Status Post Cardiac Surgery     Pt educated on a heart healthy diabetic diet on 7/26, see note from that day.    No further education needed    Follow for LOS

## 2025-07-31 ENCOUNTER — APPOINTMENT (OUTPATIENT)
Dept: OCCUPATIONAL THERAPY | Facility: HOSPITAL | Age: 55
DRG: 234 | End: 2025-07-31
Attending: INTERNAL MEDICINE
Payer: COMMERCIAL

## 2025-07-31 VITALS
BODY MASS INDEX: 27.86 KG/M2 | OXYGEN SATURATION: 94 % | DIASTOLIC BLOOD PRESSURE: 72 MMHG | HEIGHT: 67 IN | HEART RATE: 104 BPM | RESPIRATION RATE: 18 BRPM | TEMPERATURE: 98.3 F | WEIGHT: 177.5 LBS | SYSTOLIC BLOOD PRESSURE: 113 MMHG

## 2025-07-31 LAB
ANION GAP SERPL CALCULATED.3IONS-SCNC: 7 MMOL/L (ref 7–15)
BUN SERPL-MCNC: 9 MG/DL (ref 6–20)
CA-I BLD-MCNC: 4.5 MG/DL (ref 4.4–5.2)
CALCIUM SERPL-MCNC: 8.8 MG/DL (ref 8.8–10.4)
CHLORIDE SERPL-SCNC: 96 MMOL/L (ref 98–107)
CREAT SERPL-MCNC: 0.94 MG/DL (ref 0.67–1.17)
EGFRCR SERPLBLD CKD-EPI 2021: >90 ML/MIN/1.73M2
ERYTHROCYTE [DISTWIDTH] IN BLOOD BY AUTOMATED COUNT: 12.2 % (ref 10–15)
GLUCOSE BLDC GLUCOMTR-MCNC: 107 MG/DL (ref 70–99)
GLUCOSE BLDC GLUCOMTR-MCNC: 110 MG/DL (ref 70–99)
GLUCOSE BLDC GLUCOMTR-MCNC: 125 MG/DL (ref 70–99)
GLUCOSE SERPL-MCNC: 120 MG/DL (ref 70–99)
HCO3 SERPL-SCNC: 31 MMOL/L (ref 22–29)
HCT VFR BLD AUTO: 33.2 % (ref 40–53)
HGB BLD-MCNC: 11.3 G/DL (ref 13.3–17.7)
MAGNESIUM SERPL-MCNC: 1.9 MG/DL (ref 1.7–2.3)
MCH RBC QN AUTO: 30.5 PG (ref 26.5–33)
MCHC RBC AUTO-ENTMCNC: 34 G/DL (ref 31.5–36.5)
MCV RBC AUTO: 90 FL (ref 78–100)
PHOSPHATE SERPL-MCNC: 2.8 MG/DL (ref 2.5–4.5)
PLATELET # BLD AUTO: 141 10E3/UL (ref 150–450)
POTASSIUM SERPL-SCNC: 3.7 MMOL/L (ref 3.4–5.3)
RBC # BLD AUTO: 3.7 10E6/UL (ref 4.4–5.9)
SODIUM SERPL-SCNC: 134 MMOL/L (ref 135–145)
WBC # BLD AUTO: 11.9 10E3/UL (ref 4–11)

## 2025-07-31 PROCEDURE — 83735 ASSAY OF MAGNESIUM: CPT | Performed by: PHYSICIAN ASSISTANT

## 2025-07-31 PROCEDURE — 250N000013 HC RX MED GY IP 250 OP 250 PS 637: Performed by: INTERNAL MEDICINE

## 2025-07-31 PROCEDURE — 250N000011 HC RX IP 250 OP 636: Performed by: PHYSICIAN ASSISTANT

## 2025-07-31 PROCEDURE — 250N000013 HC RX MED GY IP 250 OP 250 PS 637: Performed by: PHYSICIAN ASSISTANT

## 2025-07-31 PROCEDURE — 97110 THERAPEUTIC EXERCISES: CPT | Mod: GO

## 2025-07-31 PROCEDURE — 80048 BASIC METABOLIC PNL TOTAL CA: CPT | Performed by: INTERNAL MEDICINE

## 2025-07-31 PROCEDURE — 85014 HEMATOCRIT: CPT | Performed by: INTERNAL MEDICINE

## 2025-07-31 PROCEDURE — 94799 UNLISTED PULMONARY SVC/PX: CPT

## 2025-07-31 PROCEDURE — 84100 ASSAY OF PHOSPHORUS: CPT | Performed by: PHYSICIAN ASSISTANT

## 2025-07-31 PROCEDURE — 999N000157 HC STATISTIC RCP TIME EA 10 MIN

## 2025-07-31 PROCEDURE — 84295 ASSAY OF SERUM SODIUM: CPT | Performed by: INTERNAL MEDICINE

## 2025-07-31 PROCEDURE — 97535 SELF CARE MNGMENT TRAINING: CPT | Mod: GO

## 2025-07-31 PROCEDURE — 36415 COLL VENOUS BLD VENIPUNCTURE: CPT | Performed by: PHYSICIAN ASSISTANT

## 2025-07-31 PROCEDURE — 94660 CPAP INITIATION&MGMT: CPT

## 2025-07-31 PROCEDURE — 99232 SBSQ HOSP IP/OBS MODERATE 35: CPT | Performed by: INTERNAL MEDICINE

## 2025-07-31 PROCEDURE — 82330 ASSAY OF CALCIUM: CPT | Performed by: PHYSICIAN ASSISTANT

## 2025-07-31 PROCEDURE — 210N000001 HC R&B IMCU HEART CARE

## 2025-07-31 RX ORDER — KETOROLAC TROMETHAMINE 30 MG/ML
30 INJECTION, SOLUTION INTRAMUSCULAR; INTRAVENOUS EVERY 6 HOURS PRN
Status: ACTIVE | OUTPATIENT
Start: 2025-07-31 | End: 2025-08-05

## 2025-07-31 RX ORDER — POTASSIUM CHLORIDE 1500 MG/1
20 TABLET, EXTENDED RELEASE ORAL ONCE
Status: COMPLETED | OUTPATIENT
Start: 2025-07-31 | End: 2025-07-31

## 2025-07-31 RX ORDER — MAGNESIUM OXIDE 400 MG/1
400 TABLET ORAL EVERY 4 HOURS
Status: COMPLETED | OUTPATIENT
Start: 2025-07-31 | End: 2025-07-31

## 2025-07-31 RX ORDER — METOPROLOL TARTRATE 25 MG/1
25 TABLET, FILM COATED ORAL 2 TIMES DAILY
Status: DISCONTINUED | OUTPATIENT
Start: 2025-07-31 | End: 2025-07-31

## 2025-07-31 RX ADMIN — FUROSEMIDE 20 MG: 10 INJECTION, SOLUTION INTRAMUSCULAR; INTRAVENOUS at 17:58

## 2025-07-31 RX ADMIN — ROSUVASTATIN 40 MG: 40 TABLET, FILM COATED ORAL at 08:02

## 2025-07-31 RX ADMIN — HEPARIN SODIUM 5000 UNITS: 10000 INJECTION, SOLUTION INTRAVENOUS; SUBCUTANEOUS at 21:27

## 2025-07-31 RX ADMIN — OXYCODONE HYDROCHLORIDE 5 MG: 5 TABLET ORAL at 21:28

## 2025-07-31 RX ADMIN — FUROSEMIDE 20 MG: 10 INJECTION, SOLUTION INTRAMUSCULAR; INTRAVENOUS at 12:02

## 2025-07-31 RX ADMIN — HEPARIN SODIUM 5000 UNITS: 10000 INJECTION, SOLUTION INTRAVENOUS; SUBCUTANEOUS at 12:02

## 2025-07-31 RX ADMIN — ACETAMINOPHEN 975 MG: 325 TABLET, FILM COATED ORAL at 06:42

## 2025-07-31 RX ADMIN — METOPROLOL TARTRATE 12.5 MG: 25 TABLET, FILM COATED ORAL at 12:02

## 2025-07-31 RX ADMIN — MAGNESIUM OXIDE TAB 400 MG (241.3 MG ELEMENTAL MG) 400 MG: 400 (241.3 MG) TAB at 06:42

## 2025-07-31 RX ADMIN — METOPROLOL TARTRATE 12.5 MG: 25 TABLET, FILM COATED ORAL at 17:58

## 2025-07-31 RX ADMIN — METOPROLOL TARTRATE 25 MG: 25 TABLET, FILM COATED ORAL at 08:02

## 2025-07-31 RX ADMIN — ACETAMINOPHEN 975 MG: 325 TABLET, FILM COATED ORAL at 14:08

## 2025-07-31 RX ADMIN — MAGNESIUM OXIDE TAB 400 MG (241.3 MG ELEMENTAL MG) 400 MG: 400 (241.3 MG) TAB at 12:02

## 2025-07-31 RX ADMIN — ASPIRIN 81 MG CHEWABLE TABLET 162 MG: 81 TABLET CHEWABLE at 08:02

## 2025-07-31 RX ADMIN — SENNOSIDES AND DOCUSATE SODIUM 1 TABLET: 50; 8.6 TABLET ORAL at 21:28

## 2025-07-31 RX ADMIN — ACETAMINOPHEN 975 MG: 325 TABLET, FILM COATED ORAL at 21:28

## 2025-07-31 RX ADMIN — POTASSIUM CHLORIDE 20 MEQ: 1500 TABLET, EXTENDED RELEASE ORAL at 08:02

## 2025-07-31 RX ADMIN — EZETIMIBE 10 MG: 10 TABLET ORAL at 21:27

## 2025-07-31 RX ADMIN — PANTOPRAZOLE SODIUM 40 MG: 40 TABLET, DELAYED RELEASE ORAL at 06:41

## 2025-07-31 RX ADMIN — SENNOSIDES AND DOCUSATE SODIUM 1 TABLET: 50; 8.6 TABLET ORAL at 08:03

## 2025-07-31 RX ADMIN — OXYCODONE HYDROCHLORIDE 10 MG: 5 TABLET ORAL at 08:01

## 2025-07-31 RX ADMIN — AMLODIPINE BESYLATE 2.5 MG: 2.5 TABLET ORAL at 08:02

## 2025-07-31 RX ADMIN — FUROSEMIDE 20 MG: 10 INJECTION, SOLUTION INTRAMUSCULAR; INTRAVENOUS at 06:41

## 2025-07-31 RX ADMIN — HEPARIN SODIUM 5000 UNITS: 10000 INJECTION, SOLUTION INTRAVENOUS; SUBCUTANEOUS at 04:06

## 2025-07-31 RX ADMIN — LIDOCAINE 1 PATCH: 4 PATCH TOPICAL at 14:08

## 2025-07-31 RX ADMIN — POLYETHYLENE GLYCOL 3350 17 G: 17 POWDER, FOR SOLUTION ORAL at 08:02

## 2025-07-31 ASSESSMENT — ACTIVITIES OF DAILY LIVING (ADL)
ADLS_ACUITY_SCORE: 38

## 2025-07-31 NOTE — PLAN OF CARE
"  Problem: Adult Inpatient Plan of Care  Goal: Plan of Care Review  Description: The Plan of Care Review/Shift note should be completed every shift.  The Outcome Evaluation is a brief statement about your assessment that the patient is improving, declining, or no change.  This information will be displayed automatically on your shift  note.  Outcome: Progressing  Goal: Patient-Specific Goal (Individualized)  Description: You can add care plan individualizations to a care plan. Examples of Individualization might be:  \"Parent requests to be called daily at 9am for status\", \"I have a hard time hearing out of my right ear\", or \"Do not touch me to wake me up as it startles  me\".  Outcome: Progressing  Goal: Absence of Hospital-Acquired Illness or Injury  Outcome: Progressing  Intervention: Prevent Skin Injury  Recent Flowsheet Documentation  Taken 7/31/2025 0803 by Michelle Gutierrez RN  Body Position: position changed independently  Goal: Optimal Comfort and Wellbeing  Outcome: Progressing  Intervention: Monitor Pain and Promote Comfort  Recent Flowsheet Documentation  Taken 7/31/2025 1500 by Michelle Gutierrez RN  Pain Management Interventions: rest  Taken 7/31/2025 1408 by Michelle Gutierrez RN  Pain Management Interventions: rest  Taken 7/31/2025 0900 by Michelle Gutierrez RN  Pain Management Interventions: rest  Taken 7/31/2025 0801 by Michelle Gutierrez RN  Pain Management Interventions: medication (see MAR)  Intervention: Provide Person-Centered Care  Recent Flowsheet Documentation  Taken 7/31/2025 1551 by Michelle Gutierrez RN  Trust Relationship/Rapport:   care explained   reassurance provided   questions encouraged   questions answered  Taken 7/31/2025 1225 by Michelle Gutierrez RN  Trust Relationship/Rapport:   care explained   reassurance provided   questions encouraged   questions answered  Taken 7/31/2025 0803 by Michelle Gutierrez RN  Trust Relationship/Rapport:   care explained   reassurance provided   " questions encouraged   questions answered  Goal: Readiness for Transition of Care  Outcome: Progressing     Problem: Cardiac Catheterization (Diagnostic/Interventional)  Goal: Absence of Vascular Access Complication  Outcome: Progressing  Intervention: Prevent and Manage Access Complications  Recent Flowsheet Documentation  Taken 7/31/2025 0803 by Michelle Gutierrez RN  Activity Management: up in chair     Problem: Chest Pain  Goal: Resolution of Chest Pain Symptoms  Outcome: Progressing     Problem: Cardiovascular Surgery  Goal: Improved Activity Tolerance  Outcome: Progressing  Goal: Optimal Coping with Heart Surgery  Outcome: Progressing  Goal: Absence of Bleeding  Outcome: Progressing  Goal: Effective Bowel Elimination  Outcome: Progressing  Goal: Effective Cardiac Function  Outcome: Progressing  Goal: Optimal Cerebral Tissue Perfusion  Outcome: Progressing  Goal: Fluid and Electrolyte Balance  Outcome: Progressing  Goal: Blood Glucose Level Within Targeted Range  Outcome: Progressing  Goal: Absence of Infection Signs and Symptoms  Outcome: Progressing  Goal: Anesthesia/Sedation Recovery  Outcome: Progressing  Intervention: Optimize Anesthesia Recovery  Recent Flowsheet Documentation  Taken 7/31/2025 1551 by Michelle Gutierrez RN  Administration (IS): mouthpiece utilized  Level Incentive Spirometer (mL): 1500  Number of Repetitions (IS): 8  Patient Tolerance (IS): good  Goal: Acceptable Pain Control  Outcome: Progressing  Intervention: Prevent or Manage Pain  Recent Flowsheet Documentation  Taken 7/31/2025 1500 by Michelle Gutierrez RN  Pain Management Interventions: rest  Taken 7/31/2025 1408 by Michelle Gutierrez RN  Pain Management Interventions: rest  Taken 7/31/2025 0900 by Michelle Gutierrez RN  Pain Management Interventions: rest  Taken 7/31/2025 0801 by Michelle Gutierrez RN  Pain Management Interventions: medication (see MAR)  Goal: Nausea and Vomiting Relief  Outcome: Progressing  Goal: Effective  Urinary Elimination  Outcome: Progressing  Goal: Effective Oxygenation and Ventilation  Outcome: Progressing  Intervention: Promote Airway Secretion Clearance  Recent Flowsheet Documentation  Taken 7/31/2025 1551 by Michelle Gutierrez RN  Administration (IS): mouthpiece utilized  Level Incentive Spirometer (mL): 1500  Number of Repetitions (IS): 8  Patient Tolerance (IS): good  Taken 7/31/2025 0803 by Michelle Gutierrez RN  Cough And Deep Breathing: done independently per patient     Problem: Fall Injury Risk  Goal: Absence of Fall and Fall-Related Injury  Outcome: Progressing     Problem: Pain Acute  Goal: Optimal Pain Control and Function  Outcome: Progressing  Intervention: Develop Pain Management Plan  Recent Flowsheet Documentation  Taken 7/31/2025 1500 by Michelle Gutierrez RN  Pain Management Interventions: rest  Taken 7/31/2025 1408 by Michelle Gutierrez RN  Pain Management Interventions: rest  Taken 7/31/2025 0900 by Michelle Gutierrez RN  Pain Management Interventions: rest  Taken 7/31/2025 0801 by Michelle Gutierrez RN  Pain Management Interventions: medication (see MAR)   Goal Outcome Evaluation:  Vss. Pt alert and oriented x4.  Incisional chest pain is manageable with Prn oxycodone and scheduled tylenol. He denies any SOB and chest pain. Oxygen saturation maintained well on RA. Tele has suzie sinus tachy/NSR. Quinn removed around 1130 and no urine retention  noted he is voiding well.  Bowel sounds active ,he passing gas no BM yet. Pt has been walking in the unit with Pt.  Chest tube site clean ry intact, 130 ml out put noted the last 12 hrs.

## 2025-07-31 NOTE — PROVIDER NOTIFICATION
" Sharp Left lower chest pain the middle of therapy.VSS he denies Sob and chest pain. He fell better after he came back the room and  he decline pain meds he stated that U am good now. Arcelia Baltazar, updated and she stated that  Probably chest tube pain. We can observe for now. Hopefully out tomorrow\".       "

## 2025-07-31 NOTE — TREATMENT PLAN
"RCAT Treatment Plan    Patient Score: 12    Patient Acuity: 3    Clinical Indication for Therapy: Post-CVTS lung expansion and bronchial hygiene     Therapy Ordered: PAP (VersaPAP) QID, flutter valve QID PRN    Assessment Summary: POD#1 s/p CABGx2 (7/29.) Weaned and extubated day of surgery. Remains stable from resp care stand point. Chest tube still in place. Now down to 1L NC. Strong, dry, non-productive cough. Had been doing PAP Q4 for the last 24 hours. Now meets criteria for QID regimen. Will re-assess in 72 hours.     /79 (BP Location: Right arm)   Pulse 92   Temp 99.1  F (37.3  C) (Oral)   Resp 18   Ht 1.702 m (5' 7\")   Wt 81.3 kg (179 lb 4.8 oz)   SpO2 97%   BMI 28.08 kg/m       Sherman Enriquez, RRT    Sherman Enriquez, RT  7/31/2025   "

## 2025-07-31 NOTE — PLAN OF CARE
Goal Outcome Evaluation:      Plan of Care Reviewed With: patient    Overall Patient Progress: improving  sc  Patient Name: Hung Newell   MRN: [id number]   Date of Admission: 7/25/2025    Procedure: Procedure(s):  CORONARY ARTERY BYPASS GRAFT TIMES TWO, EPI-AORTIC ULTRASOUND,  ENDOSCOPIC SURGICAL PROCUREMENT, ARTERY, RADIAL  TRANSESOPHAGEAL ECHOCARDIOGRAM    Post Op day #: 2    Subjective (Patient focus/Primary Problem for shift): respiratory system          Pain Goal 0 Pain Rating 8           Pain Medication/ Regime effective to reduce patient pain PRN oxycodone 5mg and scheduled 975mg tylenol.    Objective (Physical assessment):           Rhythm: sinus tachycardia            Bowel Activity: no if Yes indicate when: -          Bowel Medications: yes            Incision: covered          Incentive Spirometry Q 1-2 hour when awake:  yes Volume: 1500          Epicardial Pacing Wires:  yes            Patient Activity:           Up to chair for meals: yes          Ambulation with RN x2 (Not including CR): yes           Shower Daily {YES/NO/NA:131380          Nasal  Nozin BID AM/PM : yes              Chest Tubes   Pleural: yes Draining: yes               Suction: yes              Mediastinal: yes Draining: yes               Suction: yes   Dressing Change Daily:no If No, why? night shift                     Urinary Catheter: yes           Preventative WOC consult (need MD order): not applicable       Assessment (Nursing primary shift focus):   Problem: Cardiac Catheterization (Diagnostic/Interventional)  Goal: Absence of Vascular Access Complication  Outcome: Progressing  Intervention: Prevent and Manage Access Complications  Recent Flowsheet Documentation  Taken 7/31/2025 0022 by Silvana Cid, RN  Activity Management: up ad iftikhar  Taken 7/30/2025 1929 by Silvana Cid, RN  Activity Management: up ad iftikhar     Problem: Pain Acute  Goal: Optimal Pain Control and Function  Outcome:  Progressing  Intervention: Prevent or Manage Pain  Recent Flowsheet Documentation  Taken 7/31/2025 0022 by Silvana Cid RN  Medication Review/Management:   medications reviewed   high-risk medications identified  Taken 7/30/2025 1929 by Silvana Cid RN  Medication Review/Management:   medications reviewed   high-risk medications identified     Problem: Pain Acute  Goal: Optimal Pain Control and Function  Outcome: Progressing  Intervention: Prevent or Manage Pain  Recent Flowsheet Documentation  Taken 7/31/2025 0022 by Silvana Cid RN  Medication Review/Management:   medications reviewed   high-risk medications identified  Taken 7/30/2025 1929 by Silvana Cid RN  Medication Review/Management:   medications reviewed   high-risk medications identified       Plan (Patient Care Plan/focus): pt alert and oriented x4, vitals stable  through the night, chest tubes in place, had 1 L oxygen through the night. Calls appropriately, ableto make needs known, care is ongoing.       Silvana Cid RN   7/31/2025   4:21 AM

## 2025-07-31 NOTE — PROGRESS NOTES
"CVTS Daily Progress Note   POD#2 s/p CABGx2 (left internal mammary artery to the left anterior descending coronary artery/ left radial artery to the obtuse marginal coronary artery)  Attending: Abbie  LOS: 6    SUBJECTIVE/INTERVAL EVENTS:    No acute events overnight. Normotensive. NSR vs sinus tachycardia. Patient progressing overall very well. Maintaining oxygen saturations on room air. Up to chair this AM. Pain well controlled although does endorse some surgical pain. - BM / + flatus. Tolerating limited diet without nausea. UOP adequate. Chest tube output appropriate. Hgb 11.3. Patient denies new chest pain, shortness of breath, abdominal pain, calf pain, nausea. Patient has no questions today.     OBJECTIVE:  Temp:  [98  F (36.7  C)-99.1  F (37.3  C)] 98  F (36.7  C)  Pulse:  [] 116  Resp:  [10-26] 20  BP: ()/(65-79) 116/67  MAP:  [91 mmHg-106 mmHg] 91 mmHg  Arterial Line BP: (128-143)/(74-91) 131/74  FiO2 (%):  [1 %-21 %] 1 %  SpO2:  [91 %-100 %] 92 %  Vitals:    07/27/25 0435 07/29/25 0424 07/29/25 0509 07/30/25 0630   Weight: 78.3 kg (172 lb 9.6 oz) 78.3 kg (172 lb 9.6 oz) 78.9 kg (173 lb 14.4 oz) 81.3 kg (179 lb 4.8 oz)    07/31/25 0637   Weight: 80.5 kg (177 lb 8 oz)       Clinically Significant Risk Factors         # Hyponatremia: Lowest Na = 134 mmol/L in last 2 days, will monitor as appropriate  # Hypochloremia: Lowest Cl = 96 mmol/L in last 2 days, will monitor as appropriate  # Hyperchloremia: Highest Cl = 108 mmol/L in last 2 days, will monitor as appropriate      # Hypocalcemia: Lowest Ca = 8.2 mg/dL in last 2 days, will monitor and replace as appropriate        # Coagulation Defect: INR = 1.31 (Ref range: 0.85 - 1.15) and/or PTT = 33 Seconds (Ref range: 22 - 38 Seconds), will monitor for bleeding               # Overweight: Estimated body mass index is 27.8 kg/m  as calculated from the following:    Height as of this encounter: 1.702 m (5' 7\").    Weight as of this encounter: 80.5 kg " (177 lb 8 oz).       # History of CABG: noted on surgical history            Current Medications:    Scheduled Meds:  Current Facility-Administered Medications   Medication Dose Route Frequency Provider Last Rate Last Admin    acetaminophen (TYLENOL) tablet 975 mg  975 mg Oral Q8H [facility] Arcelia Baltazar PA-C   975 mg at 07/31/25 0642    amLODIPine (NORVASC) tablet 2.5 mg  2.5 mg Oral Daily Arcelia Baltazar PA-C   2.5 mg at 07/31/25 0802    aspirin (ASA) chewable tablet 162 mg  162 mg Oral or NG Tube Daily Arcelia Baltazar PA-C   162 mg at 07/31/25 0802    ezetimibe (ZETIA) tablet 10 mg  10 mg Oral At Bedtime Arcelia Baltazar PA-C   10 mg at 07/30/25 2105    furosemide (LASIX) injection 20 mg  20 mg Intravenous TID Arcelia Baltazar PA-C   20 mg at 07/31/25 0641    heparin ANTICOAGULANT injection 5,000 Units  5,000 Units Subcutaneous Q8H Arcelia Baltazar PA-C   5,000 Units at 07/31/25 0406    insulin aspart (NovoLOG) injection (RAPID ACTING)  1-7 Units Subcutaneous TID  Arcelia Baltazar PA-C   1 Units at 07/30/25 1622    insulin aspart (NovoLOG) injection (RAPID ACTING)  1-5 Units Subcutaneous At Bedtime Arcelia Baltazar PA-C        Lidocaine (LIDOCARE) 4 % Patch 1-2 patch  1-2 patch Transdermal Q24H Arcelia Baltazar PA-C   1 patch at 07/30/25 1427    magnesium oxide (MAG-OX) tablet 400 mg  400 mg Oral Q4H Allison Frazier MD   400 mg at 07/31/25 0642    metoprolol tartrate (LOPRESSOR) tablet 25 mg  25 mg Oral BID Arcelia Baltazar PA-C   25 mg at 07/31/25 0802    pantoprazole (PROTONIX) EC tablet 40 mg  40 mg Oral QAM  Arcelia Baltazar PA-C   40 mg at 07/31/25 0641    polyethylene glycol (MIRALAX) Packet 17 g  17 g Oral Daily Arcelia Baltazar PA-C   17 g at 07/31/25 0802    rosuvastatin (CRESTOR) tablet 40 mg  40 mg Oral Daily Arcelia Baltazar PA-C   40 mg at 07/31/25 0802    senna-docusate (SENOKOT-S/PERICOLACE) 8.6-50 MG  per tablet 1 tablet  1 tablet Oral BID Arcelia Baltazar PA-C   1 tablet at 07/31/25 0803     Continuous Infusions:  Current Facility-Administered Medications   Medication Dose Route Frequency Provider Last Rate Last Admin     PRN Meds:.  Current Facility-Administered Medications   Medication Dose Route Frequency Provider Last Rate Last Admin    [START ON 8/1/2025] bisacodyl (DULCOLAX) suppository 10 mg  10 mg Rectal Daily PRN Arcelia Baltazar PA-C        calcium gluconate 1 g in 50 mL in sodium chloride intermittent infusion  1 g Intravenous Once PRN Arcelia Baltazar PA-C        calcium gluconate 2 g in  mL intermittent infusion  2 g Intravenous Once PRN Arcelia Baltazar PA-C   2 g at 07/29/25 1319    calcium gluconate 3 g in sodium chloride 0.9 % 100 mL intermittent infusion  3 g Intravenous Once PRN Arcelia Baltazar PA-C        glucose gel 15-30 g  15-30 g Oral Q15 Min PRN Arcelia Baltazar PA-C        Or    dextrose 50 % injection 25-50 mL  25-50 mL Intravenous Q15 Min PRN Arcelia Baltazar PA-C        Or    glucagon injection 1 mg  1 mg Subcutaneous Q15 Min PRN Arcelia Baltazar PA-C        hydrALAZINE (APRESOLINE) injection 10 mg  10 mg Intravenous Q30 Min PRN Arcelia Baltazar PA-C        HYDROmorphone (DILAUDID) injection 0.2 mg  0.2 mg Intravenous Q2H PRN Arcelia Baltazar PA-C   0.2 mg at 07/29/25 1739    Or    HYDROmorphone (DILAUDID) injection 0.4 mg  0.4 mg Intravenous Q2H PRN Arcelia Baltazar PA-C        lactated ringers BOLUS 250 mL  250 mL Intravenous Q15 Min PRN Arcelia Baltazar PA-C   250 mL at 07/29/25 1552    magnesium hydroxide (MILK OF MAGNESIA) suspension 30 mL  30 mL Oral Daily PRN Arcelia Baltazar PA-C        naloxone (NARCAN) injection 0.2 mg  0.2 mg Intravenous Q2 Min PRN Arcelia Baltazar PA-C        Or    naloxone (NARCAN) injection 0.4 mg  0.4 mg Intravenous Q2 Min PRN Arcelia Baltazar  MERCEDES Valdivia        Or    naloxone (NARCAN) injection 0.2 mg  0.2 mg Intramuscular Q2 Min PRN Arcelia Baltazar PA-C        Or    naloxone (NARCAN) injection 0.4 mg  0.4 mg Intramuscular Q2 Min PRN Arcelia Baltazar PA-C        ondansetron (ZOFRAN ODT) ODT tab 4 mg  4 mg Oral Q6H PRN Arcelia Baltazar PA-C        Or    ondansetron (ZOFRAN) injection 4 mg  4 mg Intravenous Q6H PRN Arcelia Baltazar PA-C        oxyCODONE (ROXICODONE) tablet 5-10 mg  5-10 mg Oral Q4H PRN Arcelia Baltazar PA-C   10 mg at 07/31/25 0801    prochlorperazine (COMPAZINE) injection 10 mg  10 mg Intravenous Q6H PRN Arcelia Baltazar PA-C        Or    prochlorperazine (COMPAZINE) tablet 10 mg  10 mg Oral Q6H PRN Arcelia Baltazar PA-C           Cardiographics:    Telemetry monitoring demonstrates sinus tach with rates in the 100s per my personal review.    Imaging:  Results for orders placed or performed during the hospital encounter of 07/25/25   XR Chest 2 Views    Impression    IMPRESSION:   Mild blunting of the left costophrenic sulcus on the lateral view may represent a tiny pleural effusion. The chest is otherwise negative.   US Carotid Bilateral    Impression    IMPRESSION:  1.  Mild plaque formation, velocities consistent with less than 50% stenosis in the right internal carotid artery.  2.  Mild plaque formation, velocities consistent with less than 50% stenosis in the left internal carotid artery.  3.  Flow within the vertebral arteries is antegrade.   US Lower Extremity Venous Mapping Bilateral    Impression    IMPRESSION:  1. Bilateral lower extremity venous mapping.   XR Chest Port 1 View    Impression    IMPRESSION: Interval sternotomy. Endotracheal tube is located 2.9 cm above the arturo. Right IJ central catheter in the lower SVC. Mediastinal drains and left chest tube. Questionable tiny left apical pneumothorax with 2 mm pleural separation. Low lung   volumes. Mild bibasilar  atelectasis. No definite pleural fluid. Normal heart size. No vascular congestion.   XR Chest Port 1 View    Impression    IMPRESSION:   1.  Left-sided chest tube with likely trace left apical pneumothorax, unchanged.  2.  Right IJ central catheter tip in mid SVC.  3.  CABG. Mediastinal drains.  4.  Clear lungs with small pleural effusions.     Echocardiogram Complete   Result Value Ref Range    LVEF  60-65%        Labs, personally reviewed.  Hemoglobin   Date Value Ref Range Status   07/31/2025 11.3 (L) 13.3 - 17.7 g/dL Final   07/30/2025 11.4 (L) 13.3 - 17.7 g/dL Final   07/29/2025 12.4 (L) 13.3 - 17.7 g/dL Final     Hemoglobin POCT   Date Value Ref Range Status   07/29/2025 11.9 (L) 13.3 - 17.7 g/dL Final   07/29/2025 12.6 (L) 13.3 - 17.7 g/dL Final   07/29/2025 12.3 (L) 13.3 - 17.7 g/dL Final     WBC Count   Date Value Ref Range Status   07/31/2025 11.9 (H) 4.0 - 11.0 10e3/uL Final   07/30/2025 10.4 4.0 - 11.0 10e3/uL Final   07/29/2025 9.9 4.0 - 11.0 10e3/uL Final     Platelet Count   Date Value Ref Range Status   07/31/2025 141 (L) 150 - 450 10e3/uL Final   07/30/2025 172 150 - 450 10e3/uL Final   07/29/2025 139 (L) 150 - 450 10e3/uL Final     Creatinine   Date Value Ref Range Status   07/31/2025 0.94 0.67 - 1.17 mg/dL Final   07/30/2025 0.86 0.67 - 1.17 mg/dL Final   07/29/2025 0.88 0.67 - 1.17 mg/dL Final     Potassium   Date Value Ref Range Status   07/31/2025 3.7 3.4 - 5.3 mmol/L Final   07/30/2025 4.3 3.4 - 5.3 mmol/L Final   07/29/2025 3.8 3.4 - 5.3 mmol/L Final   07/29/2025 3.8 3.4 - 5.3 mmol/L Final     Potassium POCT   Date Value Ref Range Status   07/29/2025 3.5 3.4 - 5.3 mmol/L Final   07/29/2025 4.3 3.4 - 5.3 mmol/L Final   07/29/2025 5.2 3.4 - 5.3 mmol/L Final     Magnesium   Date Value Ref Range Status   07/31/2025 1.9 1.7 - 2.3 mg/dL Final   07/30/2025 1.7 1.7 - 2.3 mg/dL Final   07/29/2025 2.3 1.7 - 2.3 mg/dL Final          I/O:  I/O last 3 completed shifts:  In: 1414 [P.O.:1310; IV  Piggyback:104]  Out: 3542 [Urine:3122; Chest Tube:420]       Physical Exam:    General: Patient seen up in chair. NAD. Conversant. Pleasant  CV: RRR on monitor. 2+ peripheral pulses in all extremities. Mild edema. Incision C/D/I.  Pulm: Non-labored effort on room air Chest tubes in place, serosanguinous output, no appreciable air leak.  Abd: Soft, NT, ND  : Quinn with fara urine  Ext: Mild pedal edema, SCDs in place, warm, distal pulses intact  Neuro: CNs grossly intact      ASSESSMENT/PLAN:    Hung Newell is a 55 year old male with a history of CAD who is s/p CABG.    Principal Problem:    CAD (coronary artery disease)  Active Problems:    Status post coronary angiogram    Hypercholesterolemia    Pre-diabetes    CAD in native artery        NEURO:   - Scheduled Tylenol/lidocaine patches and PRN Tylenol/oxycodone/dilaudid for pain  - Adding Toradol     CV:   - Pre-op EF 60-65%  - Normotensive / sinus tach  - Amlodipine 2.5mg daily (radial artery graft protection--can turn off nicardipine 2 hours after admin  - Metoprolol 12.5mg two times a day  increased to 25mg BID  - ASA 162mg daily  - Rosuvastatin 40mg daily  - Zetia 10mg daily  - Chest tubes to remain today--check air leak     PULM:   - Extubated on POD#0  - Maintaining oxygen saturations on room air  - Encourage pulmonary toilet    FEN/GI:  - Continue electrolyte replacement protocol  - Cardiac; ADAT  - Bowel regimen    RENAL:  - Adequate UOP/hr. Continue to monitor closely.  - Cr 0.94  - Quinn to be removed today  - Diuresis with 20mg IV Lasix TID    HEME:  - Acute blood loss anemia post-op.   - Hgb stable, no bleeding concerns. Hep SQ, ASA    ID:  - Nubia op ppx complete, afebrile. No concerns for infection    ENDO:   - SSI    PPx:   - DVT: SCDs, SQ heparin TID, ambulation   - GI: Protonix 40mg IV/PO daily    DISPO:   - General telemetry status since POD#1  - Medically Ready for Discharge: Anticipated in 5+ Days         Patient discussed with   Stefania.        _______  Arcelia Baltazar PA-C  [facility] Cardiothoracic Surgery  Communication via [employer] Secure Messaging

## 2025-08-01 ENCOUNTER — APPOINTMENT (OUTPATIENT)
Dept: OCCUPATIONAL THERAPY | Facility: HOSPITAL | Age: 55
DRG: 234 | End: 2025-08-01
Attending: INTERNAL MEDICINE
Payer: COMMERCIAL

## 2025-08-01 LAB
ANION GAP SERPL CALCULATED.3IONS-SCNC: 9 MMOL/L (ref 7–15)
ATRIAL RATE - MUSE: 115 BPM
BUN SERPL-MCNC: 10.3 MG/DL (ref 6–20)
CA-I BLD-MCNC: 4.1 MG/DL (ref 4.4–5.2)
CA-I BLD-MCNC: 4.2 MG/DL (ref 4.4–5.2)
CA-I BLD-MCNC: 4.5 MG/DL (ref 4.4–5.2)
CALCIUM SERPL-MCNC: 8.7 MG/DL (ref 8.8–10.4)
CHLORIDE SERPL-SCNC: 95 MMOL/L (ref 98–107)
CREAT SERPL-MCNC: 0.94 MG/DL (ref 0.67–1.17)
DIASTOLIC BLOOD PRESSURE - MUSE: NORMAL MMHG
EGFRCR SERPLBLD CKD-EPI 2021: >90 ML/MIN/1.73M2
ERYTHROCYTE [DISTWIDTH] IN BLOOD BY AUTOMATED COUNT: 11.7 % (ref 10–15)
GLUCOSE SERPL-MCNC: 120 MG/DL (ref 70–99)
HCO3 SERPL-SCNC: 30 MMOL/L (ref 22–29)
HCT VFR BLD AUTO: 32.4 % (ref 40–53)
HGB BLD-MCNC: 11.1 G/DL (ref 13.3–17.7)
INTERPRETATION ECG - MUSE: NORMAL
MAGNESIUM SERPL-MCNC: 2 MG/DL (ref 1.7–2.3)
MCH RBC QN AUTO: 29.7 PG (ref 26.5–33)
MCHC RBC AUTO-ENTMCNC: 34.3 G/DL (ref 31.5–36.5)
MCV RBC AUTO: 87 FL (ref 78–100)
P AXIS - MUSE: NORMAL DEGREES
PHOSPHATE SERPL-MCNC: 2.4 MG/DL (ref 2.5–4.5)
PLATELET # BLD AUTO: 185 10E3/UL (ref 150–450)
POTASSIUM SERPL-SCNC: 3.9 MMOL/L (ref 3.4–5.3)
PR INTERVAL - MUSE: NORMAL MS
QRS DURATION - MUSE: 70 MS
QT - MUSE: 264 MS
QTC - MUSE: 449 MS
R AXIS - MUSE: 39 DEGREES
RBC # BLD AUTO: 3.74 10E6/UL (ref 4.4–5.9)
SODIUM SERPL-SCNC: 134 MMOL/L (ref 135–145)
SYSTOLIC BLOOD PRESSURE - MUSE: NORMAL MMHG
T AXIS - MUSE: -29 DEGREES
VENTRICULAR RATE- MUSE: 174 BPM
WBC # BLD AUTO: 11.7 10E3/UL (ref 4–11)

## 2025-08-01 PROCEDURE — 80048 BASIC METABOLIC PNL TOTAL CA: CPT | Performed by: PHYSICIAN ASSISTANT

## 2025-08-01 PROCEDURE — 250N000013 HC RX MED GY IP 250 OP 250 PS 637: Performed by: PHYSICIAN ASSISTANT

## 2025-08-01 PROCEDURE — 36415 COLL VENOUS BLD VENIPUNCTURE: CPT | Performed by: PHYSICIAN ASSISTANT

## 2025-08-01 PROCEDURE — 99233 SBSQ HOSP IP/OBS HIGH 50: CPT | Performed by: INTERNAL MEDICINE

## 2025-08-01 PROCEDURE — 250N000011 HC RX IP 250 OP 636

## 2025-08-01 PROCEDURE — 94799 UNLISTED PULMONARY SVC/PX: CPT

## 2025-08-01 PROCEDURE — 250N000011 HC RX IP 250 OP 636: Performed by: PHYSICIAN ASSISTANT

## 2025-08-01 PROCEDURE — 85027 COMPLETE CBC AUTOMATED: CPT | Performed by: INTERNAL MEDICINE

## 2025-08-01 PROCEDURE — 83735 ASSAY OF MAGNESIUM: CPT | Performed by: INTERNAL MEDICINE

## 2025-08-01 PROCEDURE — 97110 THERAPEUTIC EXERCISES: CPT | Mod: GO

## 2025-08-01 PROCEDURE — 93005 ELECTROCARDIOGRAM TRACING: CPT

## 2025-08-01 PROCEDURE — 250N000013 HC RX MED GY IP 250 OP 250 PS 637: Performed by: INTERNAL MEDICINE

## 2025-08-01 PROCEDURE — 82330 ASSAY OF CALCIUM: CPT | Performed by: PHYSICIAN ASSISTANT

## 2025-08-01 PROCEDURE — 36415 COLL VENOUS BLD VENIPUNCTURE: CPT | Performed by: INTERNAL MEDICINE

## 2025-08-01 PROCEDURE — 94660 CPAP INITIATION&MGMT: CPT

## 2025-08-01 PROCEDURE — 93010 ELECTROCARDIOGRAM REPORT: CPT | Mod: 76 | Performed by: STUDENT IN AN ORGANIZED HEALTH CARE EDUCATION/TRAINING PROGRAM

## 2025-08-01 PROCEDURE — 84100 ASSAY OF PHOSPHORUS: CPT | Performed by: INTERNAL MEDICINE

## 2025-08-01 PROCEDURE — 210N000001 HC R&B IMCU HEART CARE

## 2025-08-01 PROCEDURE — 999N000157 HC STATISTIC RCP TIME EA 10 MIN

## 2025-08-01 PROCEDURE — 93010 ELECTROCARDIOGRAM REPORT: CPT | Performed by: STUDENT IN AN ORGANIZED HEALTH CARE EDUCATION/TRAINING PROGRAM

## 2025-08-01 PROCEDURE — 97535 SELF CARE MNGMENT TRAINING: CPT | Mod: GO

## 2025-08-01 RX ORDER — MAGNESIUM OXIDE 400 MG/1
400 TABLET ORAL EVERY 4 HOURS
Status: COMPLETED | OUTPATIENT
Start: 2025-08-01 | End: 2025-08-01

## 2025-08-01 RX ORDER — METOPROLOL TARTRATE 25 MG/1
50 TABLET, FILM COATED ORAL 2 TIMES DAILY
Status: DISCONTINUED | OUTPATIENT
Start: 2025-08-01 | End: 2025-08-03 | Stop reason: HOSPADM

## 2025-08-01 RX ADMIN — AMLODIPINE BESYLATE 2.5 MG: 2.5 TABLET ORAL at 10:04

## 2025-08-01 RX ADMIN — AMIODARONE HYDROCHLORIDE 150 MG: 1.5 INJECTION, SOLUTION INTRAVENOUS at 04:23

## 2025-08-01 RX ADMIN — ACETAMINOPHEN 975 MG: 325 TABLET, FILM COATED ORAL at 13:31

## 2025-08-01 RX ADMIN — Medication 12.5 MG: at 18:10

## 2025-08-01 RX ADMIN — FUROSEMIDE 20 MG: 10 INJECTION, SOLUTION INTRAMUSCULAR; INTRAVENOUS at 18:11

## 2025-08-01 RX ADMIN — SENNOSIDES AND DOCUSATE SODIUM 1 TABLET: 50; 8.6 TABLET ORAL at 10:04

## 2025-08-01 RX ADMIN — POTASSIUM & SODIUM PHOSPHATES POWDER PACK 280-160-250 MG 1 PACKET: 280-160-250 PACK at 07:35

## 2025-08-01 RX ADMIN — FUROSEMIDE 20 MG: 10 INJECTION, SOLUTION INTRAMUSCULAR; INTRAVENOUS at 11:56

## 2025-08-01 RX ADMIN — ACETAMINOPHEN 975 MG: 325 TABLET, FILM COATED ORAL at 21:21

## 2025-08-01 RX ADMIN — LIDOCAINE 2 PATCH: 4 PATCH TOPICAL at 13:31

## 2025-08-01 RX ADMIN — POTASSIUM & SODIUM PHOSPHATES POWDER PACK 280-160-250 MG 1 PACKET: 280-160-250 PACK at 18:09

## 2025-08-01 RX ADMIN — AMIODARONE HYDROCHLORIDE 1 MG/MIN: 1.8 INJECTION, SOLUTION INTRAVENOUS at 04:35

## 2025-08-01 RX ADMIN — HEPARIN SODIUM 5000 UNITS: 10000 INJECTION, SOLUTION INTRAVENOUS; SUBCUTANEOUS at 21:21

## 2025-08-01 RX ADMIN — HEPARIN SODIUM 5000 UNITS: 10000 INJECTION, SOLUTION INTRAVENOUS; SUBCUTANEOUS at 04:57

## 2025-08-01 RX ADMIN — EZETIMIBE 10 MG: 10 TABLET ORAL at 21:22

## 2025-08-01 RX ADMIN — MAGNESIUM OXIDE TAB 400 MG (241.3 MG ELEMENTAL MG) 400 MG: 400 (241.3 MG) TAB at 11:56

## 2025-08-01 RX ADMIN — PANTOPRAZOLE SODIUM 40 MG: 40 TABLET, DELAYED RELEASE ORAL at 06:33

## 2025-08-01 RX ADMIN — METOPROLOL TARTRATE 50 MG: 25 TABLET, FILM COATED ORAL at 10:04

## 2025-08-01 RX ADMIN — AMIODARONE HYDROCHLORIDE 0.5 MG/MIN: 1.8 INJECTION, SOLUTION INTRAVENOUS at 10:33

## 2025-08-01 RX ADMIN — CALCIUM GLUCONATE 1 G: 20 INJECTION, SOLUTION INTRAVENOUS at 13:31

## 2025-08-01 RX ADMIN — FUROSEMIDE 20 MG: 10 INJECTION, SOLUTION INTRAMUSCULAR; INTRAVENOUS at 06:27

## 2025-08-01 RX ADMIN — AMIODARONE HYDROCHLORIDE 0.5 MG/MIN: 1.8 INJECTION, SOLUTION INTRAVENOUS at 22:42

## 2025-08-01 RX ADMIN — SENNOSIDES AND DOCUSATE SODIUM 1 TABLET: 50; 8.6 TABLET ORAL at 21:22

## 2025-08-01 RX ADMIN — POLYETHYLENE GLYCOL 3350 17 G: 17 POWDER, FOR SOLUTION ORAL at 10:07

## 2025-08-01 RX ADMIN — ROSUVASTATIN 40 MG: 40 TABLET, FILM COATED ORAL at 10:04

## 2025-08-01 RX ADMIN — POTASSIUM & SODIUM PHOSPHATES POWDER PACK 280-160-250 MG 1 PACKET: 280-160-250 PACK at 11:56

## 2025-08-01 RX ADMIN — CALCIUM GLUCONATE 1 G: 20 INJECTION, SOLUTION INTRAVENOUS at 08:51

## 2025-08-01 RX ADMIN — ACETAMINOPHEN 975 MG: 325 TABLET, FILM COATED ORAL at 06:27

## 2025-08-01 RX ADMIN — Medication 12.5 MG: at 13:30

## 2025-08-01 RX ADMIN — OXYCODONE HYDROCHLORIDE 5 MG: 5 TABLET ORAL at 06:27

## 2025-08-01 RX ADMIN — AMIODARONE HYDROCHLORIDE 150 MG: 1.5 INJECTION, SOLUTION INTRAVENOUS at 07:35

## 2025-08-01 RX ADMIN — ASPIRIN 81 MG CHEWABLE TABLET 162 MG: 81 TABLET CHEWABLE at 10:04

## 2025-08-01 RX ADMIN — HEPARIN SODIUM 5000 UNITS: 10000 INJECTION, SOLUTION INTRAVENOUS; SUBCUTANEOUS at 11:56

## 2025-08-01 RX ADMIN — MAGNESIUM OXIDE TAB 400 MG (241.3 MG ELEMENTAL MG) 400 MG: 400 (241.3 MG) TAB at 07:35

## 2025-08-01 ASSESSMENT — ACTIVITIES OF DAILY LIVING (ADL)
ADLS_ACUITY_SCORE: 38

## 2025-08-01 NOTE — PROGRESS NOTES
sc  Patient Name: Hung Newell   MRN: [id number]   Date of Admission: 7/25/2025    Procedure: Procedure(s):  CORONARY ARTERY BYPASS GRAFT TIMES TWO, EPI-AORTIC ULTRASOUND,  ENDOSCOPIC SURGICAL PROCUREMENT, ARTERY, RADIAL  TRANSESOPHAGEAL ECHOCARDIOGRAM    Post Op day #: 3    Subjective (Patient focus/Primary Problem for shift): Rhythm management          Pain Goal 0 Pain Rating 0           Pain Medication/ Regime effective to reduce patient pain Yes    Objective (Physical assessment):           Rhythm: atrial fibrillation            Bowel Activity: no          Bowel Medications: yes - senna, miralax, & prune juice.             Incision: healing well          Incentive Spirometry Q 1-2 hour when awake:  yes Volume: 1500          Epicardial Pacing Wires:  not applicable - pulled this AM            Patient Activity:           Up to chair for meals: no - on bedrest this AM per CV surgery          Ambulation with RN x2 (Not including CR): no - walked once when his rates improved. Initially, he was on bedrest this AM per CV surgery.          Shower Daily {YES/NO/NA:138533          Nasal  Nozin BID AM/PM : yes              Chest Tubes   Pleural: not applicable Draining: not applicable               Suction: not applicable              Mediastinal: not applicable Draining: not applicable               Suction: not applicable   Dressing Change Daily:not applicable - should be removed 24 hours after chest tube removal so pt can shower.                      Urinary Catheter: not applicable - removed yesterday voiding without issue.           Preventative WOC consult (need MD order): not applicable       Assessment (Nursing primary shift focus): Pt A/O x 4. Pressures stable & pt successfully weaned to RA - no SOB at rest or MOTA reported. Pt in a-fib RVR this AM with rates in the 130s - 140s, pt still in a-fib but rates have improved with amio gtt infusing at 16.7 mL/hr. Pt had no complaints. Once rates improved he  was ambulated in the colin - activity well tolerated & pt sat up in bedside chair. K/Mag/Phos for recheck tomorrow AM. Calcium persistently low this shift - ongoing replacement & scheduled for recheck around 1930. Continue to monitor.     Plan (Patient Care Plan/focus): Continue post-surgical cares.       Jay Rogel RN   8/1/2025   2:31 PM

## 2025-08-01 NOTE — PROGRESS NOTES
"CVTS Daily Progress Note   POD#3 s/p CABGx2 (left internal mammary artery to the left anterior descending coronary artery/ left radial artery to the obtuse marginal coronary artery)  Attending: Abbie  LOS: 7    SUBJECTIVE/INTERVAL EVENTS:    A-fib with RVR overnight which continues. Normotensive. Patient progressing overall very well. Maintaining oxygen saturations on room air. Ambulating with therapy. Pain well controlled although does endorse some surgical pain. - BM / + flatus. Tolerating limited diet without nausea. UOP adequate. Chest tube output appropriate. Hgb 11.1. Patient denies new chest pain, shortness of breath, abdominal pain, calf pain, nausea. Patient has no questions today.     OBJECTIVE:  Temp:  [98.3  F (36.8  C)-100  F (37.8  C)] 98.3  F (36.8  C)  Pulse:  [] 133  Resp:  [18-22] 18  BP: (101-142)/(59-91) 101/71  SpO2:  [88 %-99 %] 92 %  Vitals:    07/29/25 0424 07/29/25 0509 07/30/25 0630 07/31/25 0637   Weight: 78.3 kg (172 lb 9.6 oz) 78.9 kg (173 lb 14.4 oz) 81.3 kg (179 lb 4.8 oz) 80.5 kg (177 lb 8 oz)    08/01/25 0611   Weight: 79.8 kg (175 lb 14.8 oz)       Clinically Significant Risk Factors         # Hyponatremia: Lowest Na = 134 mmol/L in last 2 days, will monitor as appropriate  # Hypochloremia: Lowest Cl = 95 mmol/L in last 2 days, will monitor as appropriate  # Hypocalcemia: Lowest iCa = 4.2 mg/dL in last 2 days, will monitor and replace as appropriate                      # Overweight: Estimated body mass index is 27.55 kg/m  as calculated from the following:    Height as of this encounter: 1.702 m (5' 7\").    Weight as of this encounter: 79.8 kg (175 lb 14.8 oz).       # History of CABG: noted on surgical history            Current Medications:    Scheduled Meds:  Current Facility-Administered Medications   Medication Dose Route Frequency Provider Last Rate Last Admin    acetaminophen (TYLENOL) tablet 975 mg  975 mg Oral Q8H Arcelia Du, PA-C   975 mg at " 08/01/25 0627    amLODIPine (NORVASC) tablet 2.5 mg  2.5 mg Oral Daily Arcelia Baltazar PA-C   2.5 mg at 07/31/25 0802    aspirin (ASA) chewable tablet 162 mg  162 mg Oral or NG Tube Daily Arcelia Baltazar PA-C   162 mg at 07/31/25 0802    ezetimibe (ZETIA) tablet 10 mg  10 mg Oral At Bedtime Arcelia Baltazar PA-C   10 mg at 07/31/25 2127    furosemide (LASIX) injection 20 mg  20 mg Intravenous TID Arcelia Baltazar PA-C   20 mg at 08/01/25 0627    heparin ANTICOAGULANT injection 5,000 Units  5,000 Units Subcutaneous Q8H Arcelia Baltazar PA-C   5,000 Units at 08/01/25 0457    Lidocaine (LIDOCARE) 4 % Patch 1-2 patch  1-2 patch Transdermal Q24H Arcelia Baltazar PA-C   1 patch at 07/31/25 1408    magnesium oxide (MAG-OX) tablet 400 mg  400 mg Oral Q4H Emmett Lobo, DO   400 mg at 08/01/25 0735    metoprolol tartrate (LOPRESSOR) half-tab 12.5 mg  12.5 mg Oral TID Arcelia Baltazar PA-C   12.5 mg at 07/31/25 1758    metoprolol tartrate (LOPRESSOR) tablet 50 mg  50 mg Oral BID Arcelia Baltazar PA-C        pantoprazole (PROTONIX) EC tablet 40 mg  40 mg Oral QAM AC Arcelia Baltazar PA-C   40 mg at 08/01/25 0633    polyethylene glycol (MIRALAX) Packet 17 g  17 g Oral Daily Arcelia Baltazar PA-C   17 g at 07/31/25 0802    potassium & sodium phosphates (NEUTRA-PHOS) Packet 1 packet  1 packet Oral or Feeding Tube Q4H Mariah Loboone, DO   1 packet at 08/01/25 0735    rosuvastatin (CRESTOR) tablet 40 mg  40 mg Oral Daily Arcelia Baltazar PA-C   40 mg at 07/31/25 0802    senna-docusate (SENOKOT-S/PERICOLACE) 8.6-50 MG per tablet 1 tablet  1 tablet Oral BID Arcelia Baltazar PA-C   1 tablet at 07/31/25 2125     Continuous Infusions:  Current Facility-Administered Medications   Medication Dose Route Frequency Provider Last Rate Last Admin    amiodarone (NEXTERONE) 1.8 mg/mL in dextrose 5% 200 mL ADULT STANDARD infusion  1 mg/min Intravenous  Continuous Saurav Cervantes MD 33.3 mL/hr at 08/01/25 0435 1 mg/min at 08/01/25 0435    amiodarone (NEXTERONE) 1.8 mg/mL in dextrose 5% 200 mL ADULT STANDARD infusion  0.5 mg/min Intravenous Continuous Arcelia Baltazar PA-C         PRN Meds:.  Current Facility-Administered Medications   Medication Dose Route Frequency Provider Last Rate Last Admin    bisacodyl (DULCOLAX) suppository 10 mg  10 mg Rectal Daily PRN Arcelia Baltazar PA-C        calcium gluconate 1 g in 50 mL in sodium chloride intermittent infusion  1 g Intravenous Once PRN Arcelia Baltazar PA-C   1 g at 08/01/25 0851    calcium gluconate 2 g in  mL intermittent infusion  2 g Intravenous Once PRN Arcelia Baltazar PA-C   2 g at 07/29/25 1319    calcium gluconate 3 g in sodium chloride 0.9 % 100 mL intermittent infusion  3 g Intravenous Once PRN Arcelia Baltazar PA-C        glucose gel 15-30 g  15-30 g Oral Q15 Min PRN Arcelia Baltazar PA-C        Or    dextrose 50 % injection 25-50 mL  25-50 mL Intravenous Q15 Min PRN Arcelia Baltazar PA-C        Or    glucagon injection 1 mg  1 mg Subcutaneous Q15 Min PRN Arcelia Baltazar PA-C        hydrALAZINE (APRESOLINE) injection 10 mg  10 mg Intravenous Q30 Min PRN Arcelia Baltazar PA-C        ketorolac (TORADOL) injection 30 mg  30 mg Intravenous Q6H PRN Arcelia Baltazar PA-C        lactated ringers BOLUS 250 mL  250 mL Intravenous Q15 Min PRN Arcelia Baltazar PA-C   250 mL at 07/29/25 1552    magnesium hydroxide (MILK OF MAGNESIA) suspension 30 mL  30 mL Oral Daily PRN Arcelia Baltazar PA-C        naloxone (NARCAN) injection 0.2 mg  0.2 mg Intravenous Q2 Min PRN Arcelia Baltazar PA-C        Or    naloxone (NARCAN) injection 0.4 mg  0.4 mg Intravenous Q2 Min PRN Arcelia Baltazar PA-C        Or    naloxone (NARCAN) injection 0.2 mg  0.2 mg Intramuscular Q2 Min PRN Arcelia Baltazar PA-C        Or     naloxone (NARCAN) injection 0.4 mg  0.4 mg Intramuscular Q2 Min PRN Arcelia Baltazar PA-C        ondansetron (ZOFRAN ODT) ODT tab 4 mg  4 mg Oral Q6H PRN Arcelia Baltazar PA-C        Or    ondansetron (ZOFRAN) injection 4 mg  4 mg Intravenous Q6H PRN Arcelia Baltazar PA-C        oxyCODONE (ROXICODONE) tablet 5-10 mg  5-10 mg Oral Q4H PRN Arcelia Baltazar PA-C   5 mg at 08/01/25 0627    prochlorperazine (COMPAZINE) injection 10 mg  10 mg Intravenous Q6H PRN Arcelia Baltazar PA-C        Or    prochlorperazine (COMPAZINE) tablet 10 mg  10 mg Oral Q6H PRN Arcelia Baltazar PA-C           Cardiographics:    Telemetry monitoring demonstrates a-fibwith rates in the 130s per my personal review.    Imaging:  Results for orders placed or performed during the hospital encounter of 07/25/25   XR Chest 2 Views    Impression    IMPRESSION:   Mild blunting of the left costophrenic sulcus on the lateral view may represent a tiny pleural effusion. The chest is otherwise negative.   US Carotid Bilateral    Impression    IMPRESSION:  1.  Mild plaque formation, velocities consistent with less than 50% stenosis in the right internal carotid artery.  2.  Mild plaque formation, velocities consistent with less than 50% stenosis in the left internal carotid artery.  3.  Flow within the vertebral arteries is antegrade.   US Lower Extremity Venous Mapping Bilateral    Impression    IMPRESSION:  1. Bilateral lower extremity venous mapping.   XR Chest Port 1 View    Impression    IMPRESSION: Interval sternotomy. Endotracheal tube is located 2.9 cm above the arturo. Right IJ central catheter in the lower SVC. Mediastinal drains and left chest tube. Questionable tiny left apical pneumothorax with 2 mm pleural separation. Low lung   volumes. Mild bibasilar atelectasis. No definite pleural fluid. Normal heart size. No vascular congestion.   XR Chest Port 1 View    Impression    IMPRESSION:   1.  Left-sided  chest tube with likely trace left apical pneumothorax, unchanged.  2.  Right IJ central catheter tip in mid SVC.  3.  CABG. Mediastinal drains.  4.  Clear lungs with small pleural effusions.     Echocardiogram Complete   Result Value Ref Range    LVEF  60-65%        Labs, personally reviewed.  Hemoglobin   Date Value Ref Range Status   08/01/2025 11.1 (L) 13.3 - 17.7 g/dL Final   07/31/2025 11.3 (L) 13.3 - 17.7 g/dL Final   07/30/2025 11.4 (L) 13.3 - 17.7 g/dL Final     WBC Count   Date Value Ref Range Status   08/01/2025 11.7 (H) 4.0 - 11.0 10e3/uL Final   07/31/2025 11.9 (H) 4.0 - 11.0 10e3/uL Final   07/30/2025 10.4 4.0 - 11.0 10e3/uL Final     Platelet Count   Date Value Ref Range Status   08/01/2025 185 150 - 450 10e3/uL Final   07/31/2025 141 (L) 150 - 450 10e3/uL Final   07/30/2025 172 150 - 450 10e3/uL Final     Creatinine   Date Value Ref Range Status   08/01/2025 0.94 0.67 - 1.17 mg/dL Final   07/31/2025 0.94 0.67 - 1.17 mg/dL Final   07/30/2025 0.86 0.67 - 1.17 mg/dL Final     Potassium   Date Value Ref Range Status   08/01/2025 3.9 3.4 - 5.3 mmol/L Final   07/31/2025 3.7 3.4 - 5.3 mmol/L Final   07/30/2025 4.3 3.4 - 5.3 mmol/L Final     Potassium POCT   Date Value Ref Range Status   07/29/2025 3.5 3.4 - 5.3 mmol/L Final   07/29/2025 4.3 3.4 - 5.3 mmol/L Final   07/29/2025 5.2 3.4 - 5.3 mmol/L Final     Magnesium   Date Value Ref Range Status   08/01/2025 2.0 1.7 - 2.3 mg/dL Final   07/31/2025 1.9 1.7 - 2.3 mg/dL Final   07/30/2025 1.7 1.7 - 2.3 mg/dL Final          I/O:  I/O last 3 completed shifts:  In: 940 [P.O.:940]  Out: 2390 [Urine:2200; Chest Tube:190]       Physical Exam:    General: Patient seen up in chair. NAD. Conversant. Pleasant  CV: RRR on monitor. 2+ peripheral pulses in all extremities. Mild edema. Incision C/D/I.  Pulm: Non-labored effort on room air Chest tubes in place, serosanguinous output, no appreciable air leak.  Abd: Soft, NT, ND  : Quinn with fara urine  Ext: Mild pedal  edema, SCDs in place, warm, distal pulses intact  Neuro: CNs grossly intact      ASSESSMENT/PLAN:    Hung Newell is a 55 year old male with a history of CAD who is s/p CABG.    Principal Problem:    CAD (coronary artery disease)  Active Problems:    Status post coronary angiogram    Hypercholesterolemia    Pre-diabetes    CAD in native artery        NEURO:   - Scheduled Tylenol/lidocaine patches and PRN Tylenol/oxycodone/dilaudid/Toradol for pain    CV:   - Pre-op EF 60-65%  - A-fib AM of 8/1  - Continue amiodarone gtt  - Amlodipine 2.5mg daily (radial artery graft protection)  - Metoprolol 25mg two times a day  increased to 37.5mg BID  - ASA 162mg daily  - Rosuvastatin 40mg daily  - Zetia 10mg daily  - Chest tubes and TPW removed this AM    PULM:   - Extubated on POD#0  - Maintaining oxygen saturations on room air  - Encourage pulmonary toilet    FEN/GI:  - Continue electrolyte replacement protocol  - Cardiac; ADAT  - Bowel regimen    RENAL:  - Adequate UOP/hr. Continue to monitor closely.  - Cr 0.94  - Diuresis with 20mg IV Lasix TID    HEME:  - Acute blood loss anemia post-op.   - Hgb stable, no bleeding concerns. Hep SQ, ASA    ID:  - Nubia op ppx complete, afebrile. No concerns for infection    ENDO:   - Euglycemic; stopped BG checks and SSI    PPx:   - DVT: SCDs, SQ heparin TID, ambulation   - GI: Protonix 40mg IV/PO daily    DISPO:   - General telemetry status since POD#1  - Medically Ready for Discharge: Anticipated in 2-4 Days         Patient discussed with Dr. Aguiar.        _______  Arcelia Baltazar PA-C  [facility] Cardiothoracic Surgery  Communication via Vocera Secure Messaging

## 2025-08-01 NOTE — SIGNIFICANT EVENT
Pt alert and oriented X 4, no significant events from 7 to around 4 am this morning when he reported some back discomfort and wanted to walk around for some relief. As pt was getting back to bed heart rate went to  160s-180s. Rapid response team called, . CV surgery called and requested for EKG, pt in Afib- with RVR pt given amiodarone bolus then started on a drip 1mg/min. Hr correcting to 130s-140s. Dr. Cervantes requested we keep pt in Bed this morning unless he converts back to sinus rhythm or there's a significant improvement to heart rate.

## 2025-08-01 NOTE — PROGRESS NOTES
"[facility]    Medicine Progress Note - Hospitalist Service    Date of Admission:  7/25/2025    Assessment & Plan   Hung Newell is a 55 year old male with PMHx significant for angina who was admitted on 7/25/2025. He will undergo CABG 7/29.    CAD s/p CABG  History of elevated calcium score 44 in 2022.  Unstable angina  - exertional chest pain, s/p coronary angiogram 7/25 showed severe left main disease.  - Cardiothoracic surgery consult: CABG x2 7/29 with Dr. Leiva  - On aspirin, Crestor, Zetia  - Postoperative management per CT surgery    HLD  -On rosuvastatin 40 mg, Zetia 10 mg    Prediabetes- A1c 5.7  - D/W lifestyle modifications.    - RD consulted.          Diet: Combination Diet Regular Diet; 2 gm Sodium Diet; Low Saturated Fat Sodium <2400mg Diet    DVT Prophylaxis: Pneumatic Compression Devices  Quinn Catheter: Not present  Lines: None     Cardiac Monitoring: None  Code Status: Full Code      Clinically Significant Risk Factors         # Hyponatremia: Lowest Na = 134 mmol/L in last 2 days, will monitor as appropriate  # Hypochloremia: Lowest Cl = 96 mmol/L in last 2 days, will monitor as appropriate  # Hyperchloremia: Highest Cl = 108 mmol/L in last 2 days, will monitor as appropriate      # Hypocalcemia: Lowest Ca = 8.2 mg/dL in last 2 days, will monitor and replace as appropriate      # Coagulation Defect: INR = 1.31 (Ref range: 0.85 - 1.15) and/or PTT = 33 Seconds (Ref range: 22 - 38 Seconds), will monitor for bleeding               # Overweight: Estimated body mass index is 27.8 kg/m  as calculated from the following:    Height as of this encounter: 1.702 m (5' 7\").    Weight as of this encounter: 80.5 kg (177 lb 8 oz).       # History of CABG: noted on surgical history       Social Drivers of Health     [facility]    Social Connections          Disposition Plan     Medically Ready for Discharge: Anticipated in 2-4 " Days             Allison Frazier MD  Hospitalist Service  [facility]  Securely message with [employer] (more info)  Text page via [employer] Paging/Directory   ______________________________________________________________________    Interval History   Doing well today. Developing bruising on left forearm. Had severe pain this morning but will try to stay up on pain meds to avoid this in the future. Chest tube in place. States when he take s a big breath he can feel it in his chest. Wife at bedside.     Physical Exam   Vital Signs: Temp: 100  F (37.8  C) Temp src: Oral BP: 134/84 Pulse: 107   Resp: 18 SpO2: 94 % O2 Device: None (Room air)    Weight: 177 lbs 8 oz    General Appearance: Awake, alert, in no acute distress  Respiratory: CTAB, no wheeze  Cardiovascular: RRR, no murmur noted  GI: soft, nontender, non distended, normal bowel sounds  Skin: no jaundice, no rash, bruising on left forearm      Medical Decision Making       30 MINUTES SPENT BY ME on the date of service doing chart review, history, exam, documentation & further activities per the note.      Data     I have personally reviewed the following data over the past 24 hrs:    11.9 (H)  \   11.3 (L)   / 141 (L)     134 (L) 96 (L) 9.0 /  125 (H)   3.7 31 (H) 0.94 \       Imaging results reviewed over the past 24 hrs:   No results found for this or any previous visit (from the past 24 hours).

## 2025-08-01 NOTE — SIGNIFICANT EVENT
Significant Event Note    Time of event: 4:42 AM August 1, 2025    Description of event:  Patient went into A-fib RVR, rates up to 190, asymptomatic    Plan:  - Amiodarone 150mg bolus followed by drip at 1mg/min    Discussed with: bedside nurse, CT surgery    Saurav Cervantes MD

## 2025-08-01 NOTE — PLAN OF CARE
Goal Outcome Evaluation:      Plan of Care Reviewed With: patient    Overall Patient Progress: improving    sc  Patient Name: Hung Newell   MRN: [id number]   Date of Admission: 7/25/2025    Procedure: Procedure(s):  CORONARY ARTERY BYPASS GRAFT TIMES TWO, EPI-AORTIC ULTRASOUND,  ENDOSCOPIC SURGICAL PROCUREMENT, ARTERY, RADIAL  TRANSESOPHAGEAL ECHOCARDIOGRAM    Post Op day #: 3    Subjective (Patient focus/Primary Problem for shift): respiratory system, HR rate rhythm          Pain Goal 0  Pain Rating 8           Pain Medication/ Regime effective to reduce patient pain T    Objective (Physical assessment):           Rhythm: normal sinus rhythm converted to Afib with RVR            Bowel Activity: no if Yes indicate when: -          Bowel Medications: yes, PT WANTS TO WAIT A DAY BEFORE TAKING MILK OF MAGNESIA             Incision: covered          Incentive Spirometry Q 1-2 hour when awake:  yes Volume: 1500          Epicardial Pacing Wires:  yes            Patient Activity:           Up to chair for meals: yes          Ambulation with RN x2 (Not including CR): yes           Shower Daily {YES/NO/[id number]          Nasal  Nozin BID AM/PM : yes              Chest Tubes   Pleural: yes Draining: yes               Suction: yes              Mediastinal: yes Draining: yes               Suction: yes   Dressing Change Daily:not applicable If No, why? TO STAY IN PLAY                     Urinary Catheter: no           Preventative WOC consult (need MD order): not applicable       Assessment (Nursing primary shift focus): RESPIRATORY SYSTEM    Plan (Patient Care Plan/focus): See other note      Silvana Cid RN   8/1/2025   5:37 AM

## 2025-08-01 NOTE — PROGRESS NOTES
Care Management Follow Up    Length of Stay (days): 7    Expected Discharge Date: 08/04/2025    Anticipated Discharge Plan:   home with family     Transportation: Anticipate Family/friend    PT Recommendations:    OT Recommendations:  home with outpatient cardiac rehab, home with assist     Barriers to Discharge: medical stability    Prior Living Situation: house with spouse    Discussed  Partnership in Safe Discharge Planning  document with patient/family: No     Handoff Completed: No, handoff not indicated or clinically appropriate    Patient/Spokesperson Updated: No    Additional Information:  SW reviewed chart. Cardiothoracic surgery following. POD#3 CABGx2. Unknown discharge needs at this time.     Next Steps: follow medical progression and monitor for discharge needs.     MATEUSZ Singh  Acute Care Management  [facility]  For further questions/concerns you can reach us at Vocera Web Console

## 2025-08-01 NOTE — PROGRESS NOTES
[facility]    Medicine Progress Note - Hospitalist Service    Date of Admission:  7/25/2025    Assessment & Plan   Hugn Newell is a 55 year old male with PMHx significant for angina, prediabetes and hyperlipidemia who was admitted to [facility] on 7/25/2025 for unstable angina s/p CABG times 27/29/2025.  Postoperatively, he developed new onset atrial fibrillation with RVR requiring amiodarone drip    CAD s/p CABG  Unstable angina  History of elevated calcium score 44 in 2022.  - exertional chest pain, s/p coronary angiogram 7/25 showed severe left main disease.  - Cardiothoracic surgery consult: CABG x2 7/29 with Dr. Leiva  - On aspirin, Crestor, Zetia  - Postoperative management per CT surgery    New onset atrial fibrillation with RVR  Developed overnight on 7/31 - 8/1 with RVRs to 190s  - [id number]: 1  Plan  - Continue amiodarone drip x 24 hours (tentatively and 8/2/2025) and metoprolol tartrate 12.5 mg 3 times daily  - Hold anticoagulation  - Monitor telemetry    HLD  -On rosuvastatin 40 mg, Zetia 10 mg    Prediabetes - A1c 5.7  - D/W lifestyle modifications.    - RD consulted.    Diet: Combination Diet Regular Diet; 2 gm Sodium Diet; Low Saturated Fat Sodium <2400mg Diet  Snacks/Supplements Adult: Ensure Plus High Protein; With Meals    DVT Prophylaxis: Pneumatic Compression Devices  Quinn Catheter: Not present  Lines: None     Cardiac Monitoring: ACTIVE order. Indication: Open heart surgery (7 days)  Code Status: Full Code      Disposition Plan     Medically Ready for Discharge: Anticipated in 2-4 Days pending cardiac clearance for CAD s/p CABG and A-fib RVR    Emmett Lobo DO  Hospitalist Service  [facility]  Securely message with [employer] (more info)  Text page via [employer] Paging/Directory   ______________________________________________________________________    Interval History   Overnight developed A-fib RVR so amiodarone drip  started.    On evaluation this a.m., resting in the bed and accompanied by wife.  Chest tube able to be removed this morning since tube removal, reports pain has significantly improved.  Able to ambulate with much better.  No palpitations, chest pain, shortness of breath currently.    Physical Exam   Vital Signs: Temp: 98.2  F (36.8  C) Temp src: Oral BP: 110/56 Pulse: 98   Resp: 19 SpO2: 96 % O2 Device: None (Room air) Oxygen Delivery: 1 LPM  Weight: 175 lbs 14.83 oz    General Appearance: Awake, alert, in no acute distress  Respiratory: CTAB, no wheeze  Cardiovascular: RRR, no murmur noted  GI: soft, nontender, non distended, normal bowel sounds  Skin: no jaundice, no rash, bruising on left forearm      Medical Decision Making       30 MINUTES SPENT BY ME on the date of service doing chart review, history, exam, documentation & further activities per the note.      Data     I have personally reviewed the following data over the past 24 hrs:    11.7 (H)  \   11.1 (L)   / 185     134 (L) 95 (L) 10.3 /  120 (H)   3.9 30 (H) 0.94 \       Imaging results reviewed over the past 24 hrs:   No results found for this or any previous visit (from the past 24 hours).

## 2025-08-01 NOTE — PROGRESS NOTES
CLINICAL NUTRITION SERVICES - ASSESSMENT NOTE    RECOMMENDATIONS FOR MDs/PROVIDERS TO ORDER:    Registered Dietitian Interventions:  Ensure plus HP BID     Future/Additional Recommendations:  Monitor po, weight, labs, I/Os.      REASON FOR ASSESSMENT  LOS    HPI: Pt with PMH of HLD, premature CAD, with coronary calcium score of 44, who presented to the clinic on 7/25 for evaluation of exertional chest pain. Patient admitted for coronary revascularization. S/p CABG 7/29/25.     INFORMATION OBTAINED  Assessed patient in room.  Pt reports decreased appetite and decreased oral intakes post-op. Pt reports baseline he is not a big eater and consumes smaller portions at meals. Pt reports choosing softer foods post-op for improved tolerance. No significant wt changes. Pt denies any nausea vomiting or abd pain at this time. Ongoing constipation. Pt with no diet questions r/t consistent carb heart healthy diet. Pt agreeable to trial ensure plus HP BID.     NUTRITION HISTORY  Pt reports decreased appetite and decreased oral intakes post-op. Pt reports baseline he is not a big eater and consumes smaller portions at meals. Pt reports choosing softer foods post-op for improved tolerance. No significant wt changes. Pt denies any nausea vomiting or abd pain at this time. Ongoing constipation. Pt with no diet questions r/t consistent carb heart healthy diet. Pt agreeable to trial ensure plus HP BID.     CURRENT NUTRITION ORDERS  Diet: 2 g Sodium and Low Saturated Fat/2400 mg Sodium    CURRENT INTAKE/TOLERANCE  7/25-28: Pt consuming % x2-3 meals/day- meeting nutrition needs 7/28 7/29: NPO   7/30: 75% breakfast, 100% dinner, po not documented at lunch   7/31: 25% breakfast, 75% lunch, 50% dinner  Po intakes <75% nutrition needs x3 days     LABS  Nutrition-relevant labs: Na 134(L), Phos 2.4(L), -125 last 24 hrs    MEDICATIONS  Nutrition-relevant medications: Continuous amiodarone   Scheduled lasix, heparin, protonix,  "miralax, neutra-Phos, crestor, senna    ANTHROPOMETRICS  Height: 170.2 cm (5' 7\")  Admission Weight: 81.6 kg (180 lb) (07/25/25 0643)   Most Recent Weight: 79.8 kg (175 lb 14.8 oz) (08/01/25 0611)  IBW: 67.3 kg  BMI: Body mass index is 27.55 kg/m .   Weight History: no clinically significant wt changes   Date/Time Weight Weight Method   08/01/25 0611 79.8 kg (175 lb 14.8 oz) Bed scale   07/31/25 0637 80.5 kg (177 lb 8 oz) --   07/30/25 0630 81.3 kg (179 lb 4.8 oz) --   07/29/25 0509 78.9 kg (173 lb 14.4 oz) --   07/29/25 0424 78.3 kg (172 lb 9.6 oz) --   07/27/25 0435 78.3 kg (172 lb 9.6 oz) Standing scale   07/26/25 0446 78.8 kg (173 lb 11.2 oz) Standing scale   07/25/25 0643 81.6 kg (180 lb) --     07/23/25 81.6 kg (180 lb)   02/27/25 82.4 kg (181 lb 9.6 oz)   10/06/23 78 kg (172 lb)      Dosing Weight: 79.8 kg actual wt for kcal, 67.3 kg IBW for protein     ASSESSED NUTRITION NEEDS  Estimated Energy Needs: 1903+ kcals/day ([place] Jeor x1.2+)  Justification: Increased needs  Estimated Protein Needs: 67-81+ grams protein/day (1 - 1.2 grams of pro/kg)  Justification: Increased needs  Estimated Fluid Needs: 1903 mL/day (1 mL/kcal)  Justification: Maintenance or Per provider pending fluid status    SYSTEM AND PHYSICAL FINDINGS    GI symptoms:   Constipation  Last BM: 7/25   UOP: 3 L 7/31   Skin/wounds:   Incision/surgical sites     MALNUTRITION  % Intake: Decreased intake does not meet criteria  % Weight Loss: Weight loss does not meet criteria   Subcutaneous Fat Loss: None observed  Muscle Loss: None observed  Fluid Accumulation/Edema: None noted  Malnutrition Diagnosis: Patient does not meet two of the established criteria necessary for diagnosing malnutrition  Malnutrition Present on Admission: No    NUTRITION DIAGNOSIS  Inadequate oral intake related to acute illness as evidenced by po <75% nutrition needs     INTERVENTIONS  Medical food supplement therapy  Encouraged increased oral intakes to meet increased " needs post-op     GOALS  Meet >75% nutrition needs   Electrolytes WNL  BM frequency normalized      MONITORING/EVALUATION  Progress toward goals will be monitored and evaluated per policy.

## 2025-08-02 ENCOUNTER — APPOINTMENT (OUTPATIENT)
Dept: OCCUPATIONAL THERAPY | Facility: HOSPITAL | Age: 55
DRG: 234 | End: 2025-08-02
Attending: INTERNAL MEDICINE
Payer: COMMERCIAL

## 2025-08-02 LAB
ANION GAP SERPL CALCULATED.3IONS-SCNC: 7 MMOL/L (ref 7–15)
BUN SERPL-MCNC: 7.3 MG/DL (ref 6–20)
CA-I BLD-MCNC: 4.2 MG/DL (ref 4.4–5.2)
CA-I BLD-MCNC: 4.5 MG/DL (ref 4.4–5.2)
CALCIUM SERPL-MCNC: 8.6 MG/DL (ref 8.8–10.4)
CHLORIDE SERPL-SCNC: 94 MMOL/L (ref 98–107)
CREAT SERPL-MCNC: 0.79 MG/DL (ref 0.67–1.17)
EGFRCR SERPLBLD CKD-EPI 2021: >90 ML/MIN/1.73M2
GLUCOSE SERPL-MCNC: 117 MG/DL (ref 70–99)
HCO3 SERPL-SCNC: 32 MMOL/L (ref 22–29)
MAGNESIUM SERPL-MCNC: 1.9 MG/DL (ref 1.7–2.3)
MCV RBC AUTO: 86 FL (ref 78–100)
PHOSPHATE SERPL-MCNC: 2.6 MG/DL (ref 2.5–4.5)
PLATELET # BLD AUTO: 201 10E3/UL (ref 150–450)
POTASSIUM SERPL-SCNC: 3.3 MMOL/L (ref 3.4–5.3)
POTASSIUM SERPL-SCNC: 3.5 MMOL/L (ref 3.4–5.3)
SODIUM SERPL-SCNC: 133 MMOL/L (ref 135–145)

## 2025-08-02 PROCEDURE — 250N000011 HC RX IP 250 OP 636: Performed by: PHYSICIAN ASSISTANT

## 2025-08-02 PROCEDURE — 250N000013 HC RX MED GY IP 250 OP 250 PS 637: Performed by: PHYSICIAN ASSISTANT

## 2025-08-02 PROCEDURE — 85049 AUTOMATED PLATELET COUNT: CPT | Performed by: PHYSICIAN ASSISTANT

## 2025-08-02 PROCEDURE — 82330 ASSAY OF CALCIUM: CPT | Performed by: PHYSICIAN ASSISTANT

## 2025-08-02 PROCEDURE — 83735 ASSAY OF MAGNESIUM: CPT | Performed by: INTERNAL MEDICINE

## 2025-08-02 PROCEDURE — 99232 SBSQ HOSP IP/OBS MODERATE 35: CPT | Performed by: INTERNAL MEDICINE

## 2025-08-02 PROCEDURE — 999N000156 HC STATISTIC RCP CONSULT EA 30 MIN

## 2025-08-02 PROCEDURE — 210N000001 HC R&B IMCU HEART CARE

## 2025-08-02 PROCEDURE — 84100 ASSAY OF PHOSPHORUS: CPT | Performed by: INTERNAL MEDICINE

## 2025-08-02 PROCEDURE — 94660 CPAP INITIATION&MGMT: CPT | Mod: 6MC

## 2025-08-02 PROCEDURE — 36415 COLL VENOUS BLD VENIPUNCTURE: CPT | Performed by: PHYSICIAN ASSISTANT

## 2025-08-02 PROCEDURE — 80048 BASIC METABOLIC PNL TOTAL CA: CPT | Performed by: PHYSICIAN ASSISTANT

## 2025-08-02 PROCEDURE — 97110 THERAPEUTIC EXERCISES: CPT | Mod: GO

## 2025-08-02 PROCEDURE — 999N000157 HC STATISTIC RCP TIME EA 10 MIN

## 2025-08-02 PROCEDURE — 84132 ASSAY OF SERUM POTASSIUM: CPT | Performed by: PHYSICIAN ASSISTANT

## 2025-08-02 RX ORDER — MAGNESIUM CARB/ALUMINUM HYDROX 105-160MG
296 TABLET,CHEWABLE ORAL
Status: ACTIVE | OUTPATIENT
Start: 2025-08-02 | End: 2025-08-02

## 2025-08-02 RX ORDER — MAGNESIUM OXIDE 400 MG/1
400 TABLET ORAL EVERY 4 HOURS
Status: COMPLETED | OUTPATIENT
Start: 2025-08-02 | End: 2025-08-02

## 2025-08-02 RX ORDER — POTASSIUM CHLORIDE 1500 MG/1
40 TABLET, EXTENDED RELEASE ORAL ONCE
Status: COMPLETED | OUTPATIENT
Start: 2025-08-02 | End: 2025-08-02

## 2025-08-02 RX ORDER — AMIODARONE HYDROCHLORIDE 200 MG/1
200 TABLET ORAL 2 TIMES DAILY
Status: DISCONTINUED | OUTPATIENT
Start: 2025-08-02 | End: 2025-08-03 | Stop reason: HOSPADM

## 2025-08-02 RX ORDER — POTASSIUM CHLORIDE 1500 MG/1
20 TABLET, EXTENDED RELEASE ORAL ONCE
Status: COMPLETED | OUTPATIENT
Start: 2025-08-02 | End: 2025-08-02

## 2025-08-02 RX ADMIN — MAGNESIUM OXIDE TAB 400 MG (241.3 MG ELEMENTAL MG) 400 MG: 400 (241.3 MG) TAB at 06:26

## 2025-08-02 RX ADMIN — ASPIRIN 81 MG CHEWABLE TABLET 162 MG: 81 TABLET CHEWABLE at 09:17

## 2025-08-02 RX ADMIN — ROSUVASTATIN 40 MG: 40 TABLET, FILM COATED ORAL at 09:18

## 2025-08-02 RX ADMIN — OXYCODONE HYDROCHLORIDE 5 MG: 5 TABLET ORAL at 19:11

## 2025-08-02 RX ADMIN — HEPARIN SODIUM 5000 UNITS: 10000 INJECTION, SOLUTION INTRAVENOUS; SUBCUTANEOUS at 11:20

## 2025-08-02 RX ADMIN — HEPARIN SODIUM 5000 UNITS: 10000 INJECTION, SOLUTION INTRAVENOUS; SUBCUTANEOUS at 21:36

## 2025-08-02 RX ADMIN — FUROSEMIDE 20 MG: 10 INJECTION, SOLUTION INTRAMUSCULAR; INTRAVENOUS at 11:20

## 2025-08-02 RX ADMIN — POTASSIUM & SODIUM PHOSPHATES POWDER PACK 280-160-250 MG 1 PACKET: 280-160-250 PACK at 11:20

## 2025-08-02 RX ADMIN — ACETAMINOPHEN 975 MG: 325 TABLET, FILM COATED ORAL at 06:25

## 2025-08-02 RX ADMIN — POTASSIUM CHLORIDE 40 MEQ: 1500 TABLET, EXTENDED RELEASE ORAL at 06:26

## 2025-08-02 RX ADMIN — ACETAMINOPHEN 975 MG: 325 TABLET, FILM COATED ORAL at 13:08

## 2025-08-02 RX ADMIN — HEPARIN SODIUM 5000 UNITS: 10000 INJECTION, SOLUTION INTRAVENOUS; SUBCUTANEOUS at 03:36

## 2025-08-02 RX ADMIN — POTASSIUM CHLORIDE 20 MEQ: 1500 TABLET, EXTENDED RELEASE ORAL at 14:33

## 2025-08-02 RX ADMIN — OXYCODONE HYDROCHLORIDE 10 MG: 5 TABLET ORAL at 04:12

## 2025-08-02 RX ADMIN — AMLODIPINE BESYLATE 2.5 MG: 2.5 TABLET ORAL at 09:17

## 2025-08-02 RX ADMIN — CALCIUM GLUCONATE 1 G: 20 INJECTION, SOLUTION INTRAVENOUS at 06:31

## 2025-08-02 RX ADMIN — Medication 12.5 MG: at 03:36

## 2025-08-02 RX ADMIN — METOPROLOL TARTRATE 50 MG: 25 TABLET, FILM COATED ORAL at 09:18

## 2025-08-02 RX ADMIN — PANTOPRAZOLE SODIUM 40 MG: 40 TABLET, DELAYED RELEASE ORAL at 06:26

## 2025-08-02 RX ADMIN — OXYCODONE HYDROCHLORIDE 5 MG: 5 TABLET ORAL at 09:18

## 2025-08-02 RX ADMIN — MAGNESIUM OXIDE TAB 400 MG (241.3 MG ELEMENTAL MG) 400 MG: 400 (241.3 MG) TAB at 11:20

## 2025-08-02 RX ADMIN — AMIODARONE HYDROCHLORIDE 200 MG: 200 TABLET ORAL at 21:35

## 2025-08-02 RX ADMIN — SENNOSIDES AND DOCUSATE SODIUM 1 TABLET: 50; 8.6 TABLET ORAL at 09:19

## 2025-08-02 RX ADMIN — EZETIMIBE 10 MG: 10 TABLET ORAL at 21:35

## 2025-08-02 RX ADMIN — FUROSEMIDE 20 MG: 10 INJECTION, SOLUTION INTRAMUSCULAR; INTRAVENOUS at 19:03

## 2025-08-02 RX ADMIN — MAGNESIUM HYDROXIDE 30 ML: 400 SUSPENSION ORAL at 10:34

## 2025-08-02 RX ADMIN — FUROSEMIDE 20 MG: 10 INJECTION, SOLUTION INTRAMUSCULAR; INTRAVENOUS at 06:26

## 2025-08-02 RX ADMIN — ACETAMINOPHEN 975 MG: 325 TABLET, FILM COATED ORAL at 21:35

## 2025-08-02 RX ADMIN — Medication 12.5 MG: at 13:08

## 2025-08-02 RX ADMIN — POLYETHYLENE GLYCOL 3350 17 G: 17 POWDER, FOR SOLUTION ORAL at 09:19

## 2025-08-02 RX ADMIN — Medication 12.5 MG: at 19:03

## 2025-08-02 RX ADMIN — AMIODARONE HYDROCHLORIDE 200 MG: 200 TABLET ORAL at 11:22

## 2025-08-02 RX ADMIN — POTASSIUM & SODIUM PHOSPHATES POWDER PACK 280-160-250 MG 1 PACKET: 280-160-250 PACK at 06:26

## 2025-08-02 ASSESSMENT — ACTIVITIES OF DAILY LIVING (ADL)
ADLS_ACUITY_SCORE: 33
ADLS_ACUITY_SCORE: 35
ADLS_ACUITY_SCORE: 33
ADLS_ACUITY_SCORE: 35
ADLS_ACUITY_SCORE: 35
ADLS_ACUITY_SCORE: 33
ADLS_ACUITY_SCORE: 33
ADLS_ACUITY_SCORE: 35
ADLS_ACUITY_SCORE: 33
ADLS_ACUITY_SCORE: 33
ADLS_ACUITY_SCORE: 35
ADLS_ACUITY_SCORE: 35
ADLS_ACUITY_SCORE: 33
ADLS_ACUITY_SCORE: 35
ADLS_ACUITY_SCORE: 33
ADLS_ACUITY_SCORE: 35

## 2025-08-02 NOTE — PROGRESS NOTES
sc  Patient Name: Hung Newell   MRN: [id number]   Date of Admission: 7/25/2025    Procedure: Procedure(s):  CORONARY ARTERY BYPASS GRAFT TIMES TWO, EPI-AORTIC ULTRASOUND,  ENDOSCOPIC SURGICAL PROCUREMENT, ARTERY, RADIAL  TRANSESOPHAGEAL ECHOCARDIOGRAM    Post Op day #:4    Subjective (Patient focus/Primary Problem for shift): Poop          Pain Goal 5 Pain Rating 0           Pain Medication/ Regime effective to reduce patient pain Yes - scheduled tylenol & oxycodone PRN.    Objective (Physical assessment):           Rhythm: normal sinus rhythm            Bowel Activity: yes if Yes indicate when: today          Bowel Medications: yes            Incision: healing well          Incentive Spirometry Q 1-2 hour when awake:  yes Volume: 1500          Epicardial Pacing Wires:  not applicable            Patient Activity:           Up to chair for meals: yes          Ambulation with RN x2 (Not including CR): yes           Shower Daily {YES/NO/NA:[id number]          Nasal  Nozin BID AM/PM : yes              Chest Tubes   Pleural: not applicable Draining: not applicable               Suction: not applicable              Mediastinal: not applicable Draining: not applicable               Suction: not applicable   Dressing Change Daily:not applicable If No, why? Showered today & incisions left open to air                     Urinary Catheter: not applicable           Preventative WOC consult (need MD order): not applicable       Assessment (Nursing primary shift focus): Pt A/O x 4. Pressures soft with systolic in the 90s. Pt saturating well on RA. Tele tracing NSR. Ambulated multiple times today without incident and denied any associated S/S. Pt also had a bowel movement today. Progressing well overall. PRN oxycodone provided for breakthrough pain. Lytes for recheck tomorrow AM. Continue to monitor.    Plan (Patient Care Plan/focus): IV diuresis, post-surgical cares.      Jay Rogel RN   8/2/2025   2:50  PM

## 2025-08-02 NOTE — PROGRESS NOTES
[facility]    Medicine Progress Note - Hospitalist Service    Date of Admission:  7/25/2025    Assessment & Plan   Hung Newell is a 55 year old male with PMHx significant for angina, prediabetes and hyperlipidemia who was admitted to [facility] on 7/25/2025 for unstable angina s/p CABG times 27/29/2025.  Postoperatively, he developed new onset atrial fibrillation with RVR 8/1/2025 requiring amiodarone drip (discontinued 8/2/2025)    CAD s/p CABG  Unstable angina  History of elevated calcium score 44 in 2022.  - exertional chest pain, s/p coronary angiogram 7/25 showed severe left main disease.  - Cardiothoracic surgery consult: CABG x2 7/29 with Dr. Leiva  - On aspirin, Crestor, Zetia  - Postoperative management per CT surgery  - Cardiac rehab, PT/OT consulted    New onset atrial fibrillation with RVR - resolved  Developed overnight on 7/31 - 8/1 with RVRs to 190s  - [id number]: 1  Plan  - Amiodarone discontinued this am without any further RVR.  - Continue metoprolol tartrate 12.5 mg 3 times daily  - Hold anticoagulation  - Monitor telemetry    HLD  -On rosuvastatin 40 mg, Zetia 10 mg    Prediabetes - A1c 5.7  - D/W lifestyle modifications.    - RD consulted.    Diet: Combination Diet Regular Diet; 2 gm Sodium Diet; Low Saturated Fat Sodium <2400mg Diet  Snacks/Supplements Adult: Ensure Plus High Protein; With Meals    DVT Prophylaxis: Pneumatic Compression Devices  Quinn Catheter: Not present  Lines: None     Cardiac Monitoring: ACTIVE order. Indication: Open heart surgery (7 days)  Code Status: Full Code      Disposition Plan     Medically Ready for Discharge: Anticipated in 2-4 Days pending cardiac clearance for CAD s/p CABG and A-fib RVR    Emmett Lobo DO  Hospitalist Service  [facility]  Securely message with [email or web address] (more info)  Text page via [employer] Paging/Directory    ______________________________________________________________________    Interval History   Amiodarone 0.5mg/hr infusion stopped around 0500 without any further RVR.    On evaluation this a.m., resting in the bed and accompanied by wife.  Remains rate controlled off of amiodarone and just on metoprolol. Able to ambulate today without any pain or dizziness    Physical Exam   Vital Signs: Temp: 98.2  F (36.8  C) Temp src: Oral BP: 105/65 Pulse: 96   Resp: 18 SpO2: 96 % O2 Device: None (Room air)    Weight: 176 lbs 8 oz    General Appearance: Awake, alert, in no acute distress  Respiratory: CTAB, no wheeze  Cardiovascular: RRR, no murmur noted  GI: soft, nontender, non distended, normal bowel sounds  Skin: no jaundice, no rash, bruising on left forearm      Medical Decision Making       30 MINUTES SPENT BY ME on the date of service doing chart review, history, exam, documentation & further activities per the note.      Data     I have personally reviewed the following data over the past 24 hrs:    N/A  \   N/A   / 201     133 (L) 94 (L) 7.3 /  117 (H)   3.3 (L) 32 (H) 0.79 \       Imaging results reviewed over the past 24 hrs:   No results found for this or any previous visit (from the past 24 hours).

## 2025-08-02 NOTE — DISCHARGE SUMMARY
Cardiovascular Surgery Discharge Summary    Primary Care Physician:  No Ref-Primary, Physician  Discharge Provider: Arcelia Baltazar PA-C  Admission Date: 7/25/2025  Admission Diagnoses: Hypercholesterolemia [E78.00]  Family history of premature coronary artery disease [Z82.49]  Unstable angina (H) [I20.0]  Status post coronary angiogram [Z98.890]  CAD in native artery [I25.10]  Discharge Date: 8/03/2025   Disposition: Home   Condition at Discharge: Good  Code Status: Full Code     Principal Diagnosis:   Severe multi-vessel coronary artery disease s/p CABG    Discharge Diagnoses:    Active Problems:      Patient Active Problem List   Diagnosis    Status post coronary angiogram    Hypercholesterolemia    CAD (coronary artery disease)    Pre-diabetes    CAD in native artery         Consult/s: Dietary, critical care medicine, cardiology,     Surgery:   07/29/2025 with Dr. Leiva   Median sternotomy   Take down of the left internal mammary artery    Endoscopic radial artery procurement from the left foream    Epiaortic ultrasound of the ascending aorta    Placement on central cardiopulmonary bypass    Double vessel coronary artery bypass grafting;   -  Left internal mammary artery to the left anterior descending coronary artery  -  Left radial artery to the obtuse marginal coronary artery.    Placement of temporary atrial and ventricular pacing wires    FINDINGS AT OPERATION:  His ascending aorta was normal in size and free of any plaque seen on epiaortic ultrasound.  His pulmonary artery pressures were normal to palplation.  His left ventricular function was normal.  The left internal mammary artery was a 2.5 mm in diameter conduit with excellent flow.  The left radial artery was a 2.5 mm in diameter conduit of excellent quality.   The most distal obtuse marginal was a 1.5 mm in diameter target vessel, an excellent vessel for bypass grafting.  The left anterior descending coronary artery in its distal middle  third was a 2 mm in diameter target vessel, an excellent vessel for bypass grafting.      Discharge Medications:      Review of your medicines        START taking        Dose / Directions   acetaminophen 325 MG tablet  Commonly known as: TYLENOL  Used for: S/P CABG (coronary artery bypass graft)      Dose: 975 mg  Take 3 tablets (975 mg) by mouth every 8 hours as needed for mild pain.  Quantity: 60 tablet  Refills: 0     amiodarone 200 MG tablet  Commonly known as: PACERONE  Used for: S/P CABG (coronary artery bypass graft)      Dose: 200 mg  Take 1 tablet (200 mg) by mouth 2 times daily. For 30 days, then stop  Quantity: 60 tablet  Refills: 0     amLODIPine 2.5 MG tablet  Commonly known as: NORVASC  Used for: S/P CABG (coronary artery bypass graft)      Dose: 2.5 mg  Take 1 tablet (2.5 mg) by mouth daily.  Quantity: 90 tablet  Refills: 3     clopidogrel 75 MG tablet  Commonly known as: PLAVIX  Used for: S/P CABG (coronary artery bypass graft)      Dose: 75 mg  Take 1 tablet (75 mg) by mouth daily.  Quantity: 90 tablet  Refills: 3     furosemide 20 MG tablet  Commonly known as: LASIX  Used for: S/P CABG (coronary artery bypass graft)      Dose: 20 mg  Take 1 tablet (20 mg) by mouth 2 times daily. For 3 days. Call if still weight up or with leg swelling. [phone]  Quantity: 6 tablet  Refills: 0     Metoprolol Tartrate 37.5 MG Tabs  Used for: S/P CABG (coronary artery bypass graft)      Dose: 50 mg  Take 50 mg by mouth 2 times daily.  Quantity: 180 tablet  Refills: 0     oxyCODONE 5 MG tablet  Commonly known as: ROXICODONE  Used for: S/P CABG (coronary artery bypass graft)      Dose: 5 mg  Take 1 tablet (5 mg) by mouth every 8 hours as needed for moderate pain or severe pain.  Quantity: 15 tablet  Refills: 0     potassium chloride brittny ER 20 MEQ CR tablet  Commonly known as: KLOR-CON M20  Used for: S/P CABG (coronary artery bypass graft)      Dose: 20 mEq  Take 1 tablet (20 mEq) by mouth daily.  Quantity: 3  tablet  Refills: 0     senna-docusate 8.6-50 MG tablet  Commonly known as: SENOKOT-S/PERICOLACE  Used for: S/P CABG (coronary artery bypass graft)      Dose: 1 tablet  Take 1 tablet by mouth 2 times daily as needed for constipation.  Quantity: 30 tablet  Refills: 0            CONTINUE these medicines which have NOT CHANGED        Dose / Directions   aspirin 81 MG chewable tablet  Commonly known as: ASA      Dose: 81 mg  Take 81 mg by mouth daily.  Refills: 0     ezetimibe 10 MG tablet  Commonly known as: ZETIA  Used for: Coronary artery disease involving native coronary artery of native heart without angina pectoris, Family history of premature coronary artery disease      Dose: 10 mg  Take 1 tablet (10 mg) by mouth daily.  Quantity: 90 tablet  Refills: 3     nitroGLYcerin 0.4 MG sublingual tablet  Commonly known as: Nitrostat  Used for: Unstable angina (H)      For chest pain place 1 tablet under the tongue every 5 minutes for 3 doses. If symptoms persist 5 minutes after 1st dose call 911.  Quantity: 25 tablet  Refills: 11     rosuvastatin 40 MG tablet  Commonly known as: CRESTOR  Used for: Family history of premature coronary artery disease, Coronary artery disease involving native coronary artery of native heart without angina pectoris      Dose: 40 mg  TAKE ONE (1) TABLET (40 MG) BY MOUTH DAILY  Quantity: 90 tablet  Refills: 2               Where to get your medicines        These medications were sent to [facility] [address] [address]      Phone: [phone]   acetaminophen 325 MG tablet  amiodarone 200 MG tablet  amLODIPine 2.5 MG tablet  clopidogrel 75 MG tablet  furosemide 20 MG tablet  Metoprolol Tartrate 37.5 MG Tabs  oxyCODONE 5 MG tablet  potassium chloride brittny ER 20 MEQ CR tablet  senna-docusate 8.6-50 MG tablet         Discharge Instructions:    Follow up appointment with Primary Care Physician: Sofia  Ref-Primary, Physician within 7 days of discharge  Follow up appointment with Specialist:    Follow with CV Surgery as scheduled.   Follow-up with cardiology as scheduled.     Hospital Summary:   Hung Newell is a 55 year old male who was admitted to [facility] on 7/25/2025 following an abnormal outpatient coronary angiogram demonstrating severe multi-vessel coronary artery disease. He was referred to CV surgery for evaluation for possible urgent coronary artery revascularization.     Patient was deemed a candidate for coronary artery bypass surgery, and was taken to the operating room on 07/29/2025 where patient underwent double vessel coronary artery bypass and endoscopic radial artery harvest from the left arm. Surgery was uneventful and patient was brought to the ICU post-operatively. He was extubated on POD#0 and weaned from pressors. Patient was awake and alert, afebrile, and with stable vitals. Insulin drip was discontinued and he was transitioned to a sliding scale. He was transferred to general telemetry status on POD#1 where patient has had return of bowel function, is maintaining oxygen saturations on room air, had his chest tubes removed, and has no complaints of chest pain or shortness of breath. On 08/03/25, patient was stable enough to be discharged to home.    Of note, patient is on amlodipine for radial artery graft protection. This should be maintained for one year.     Patient will be maintained on Plavix for one year post-op per surgeon preference for graft patency. OK to stop Plavix 08/2026, at which point ASA should be increased to 162mg daily indefinitely s/p CABG.    Patient did have one episode of post op a-fib with RVR for a short time that resolved after amiodarone administration. He is being discharged on amiodarone PO x30 days. He has been in NSR since conversion and thus is not anti-coagulated.    Patient has mild bilateral lower extremity edema and is slightly weight up  "(1lb) at discharge; due to this he will be sent with 3 days of Lasix therapy with potassium supplementation. He is aware to call should he remain weight up or have lower extremity swelling.      Post-CABG Discharge Med Documentation:  ASA: Yes, 81mg daily (lowered due to Plavix)  Beta blocker: Yes, metoprolol tartrate 37.5mg BID  Statin: Yes, rosuvastatin 40mg daily    Vital signs:  Temp: 99.4  F (37.4  C) Temp src: Oral BP: 118/70 Pulse: 95   Resp: 18 SpO2: 92 % O2 Device: None (Room air) Oxygen Delivery: 1 LPM Height: 170.2 cm (5' 7\") Weight: 79.3 kg (174 lb 14.4 oz)  Estimated body mass index is 27.39 kg/m  as calculated from the following:    Height as of this encounter: 1.702 m (5' 7\").    Weight as of this encounter: 79.3 kg (174 lb 14.4 oz).          Physical Exam:    Pertinent exam findings on day of discharge include:  Gen: Seen up in chair. NAD. Pleasant and conversant.   CV: RRR on monitor. Trace LE edema.  Pulm: Non-labored breathing on room air.  Abd: Soft, non-tender, non-distended  Neuro: CNs grossly intact  Inc: C/D/I    _______  Arcelia Baltazar PA-C  [facility] Cardiothoracic Surgery  Communication via [employer]oc[employer] Secure Messaging  "

## 2025-08-02 NOTE — PLAN OF CARE
Problem: Adult Inpatient Plan of Care  Goal: Plan of Care Review  Description: The Plan of Care Review/Shift note should be completed every shift.  The Outcome Evaluation is a brief statement about your assessment that the patient is improving, declining, or no change.  This information will be displayed automatically on your shift  note.  Outcome: Progressing     Problem: Cardiac Catheterization (Diagnostic/Interventional)  Goal: Anesthesia/Sedation Recovery  Intervention: Optimize Anesthesia Recovery  Recent Flowsheet Documentation  Taken 8/1/2025 2115 by Dawit Lou RN  Safety Promotion/Fall Prevention:   activity supervised   nonskid shoes/slippers when out of bed   patient and family education   safety round/check completed  Reorientation Measures:   calendar in view   clock in view  Taken 8/1/2025 1750 by Dawit Lou RN  Safety Promotion/Fall Prevention:   activity supervised   nonskid shoes/slippers when out of bed   patient and family education   safety round/check completed  Reorientation Measures:   calendar in view   clock in view  Goal: Optimal Pain Control and Function  Intervention: Prevent or Manage Pain  Recent Flowsheet Documentation  Taken 8/1/2025 2115 by Dawit Lou RN  Pain Management Interventions: medication (see MAR)  Goal: Absence of Vascular Access Complication  Outcome: Progressing  Intervention: Prevent and Manage Access Complications  Recent Flowsheet Documentation  Taken 8/1/2025 2115 by Dawit Lou RN  Activity Management: activity adjusted per tolerance  Taken 8/1/2025 1750 by Dawit Lou RN  Activity Management: activity adjusted per tolerance     Problem: Fall Injury Risk  Goal: Absence of Fall and Fall-Related Injury  Outcome: Progressing  Intervention: Identify and Manage Contributors  Recent Flowsheet Documentation  Taken 8/1/2025 2115 by Dawit Lou RN  Medication Review/Management: medications reviewed  Taken 8/1/2025 1750 by Dawit Lou RN  Medication  Review/Management: medications reviewed  Intervention: Promote Injury-Free Environment  Recent Flowsheet Documentation  Taken 8/1/2025 2115 by Dawit Lou RN  Safety Promotion/Fall Prevention:   activity supervised   nonskid shoes/slippers when out of bed   patient and family education   safety round/check completed  Taken 8/1/2025 1750 by Dawit Lou RN  Safety Promotion/Fall Prevention:   activity supervised   nonskid shoes/slippers when out of bed   patient and family education   safety round/check completed   Goal Outcome Evaluation:                      ..sc  Patient Name: Hung Newell   MRN: [id number]   Date of Admission: 7/25/2025    Procedure: Procedure(s):  CORONARY ARTERY BYPASS GRAFT TIMES TWO, EPI-AORTIC ULTRASOUND,  ENDOSCOPIC SURGICAL PROCUREMENT, ARTERY, RADIAL  TRANSESOPHAGEAL ECHOCARDIOGRAM    Post Op day #:3    Subjective (Patient focus/Primary Problem for shift):  bowel movement          Pain Goal 0/10 Pain Rating 2-4/10 internittent           Pain Medication/ Regime effective to reduce patient pain yes    Objective (Physical assessment):           Rhythm: atrial fibrillation            Bowel Activity: no if Yes indicate when: .          Bowel Medications: yes            Incision: healing well          Incentive Spirometry Q 1-2 hour when awake:  yes Volume: .          Epicardial Pacing Wires:  no            Patient Activity:           Up to chair for meals: yes          Ambulation with RN x2 (Not including CR): yes           Shower Daily {YES/NO/NA:[id number]          Nasal  Nozin BID AM/PM : yes              Chest Tubes   Pleural: no Draining: not applicable               Suction: not applicable              Mediastinal: no Draining: not applicable               Suction: not applicable   Dressing Change Daily:yes If No, why?                      Urinary Catheter: not applicable           Preventative WOC consult (need MD order): not applicable       Assessment (Nursing primary  shift focus):     Alert. Oriented x 4.    Denied SOB with activity. On room air. Up on chair this afternoon till dinner time. Walked in the hallway with standby assist, gait belt for safety. Sternal precaution continued.    Chest tubes and epicardial wires removed on days shift. Dressing clean,dry and intact.    Afib 90s-100s. On Amiodarone gtt. Rhythm converted to NSR later tonight.    Ionized calcium recheck was 4.5.     Right wrist IV site, where amiodarone gtt was running, slightly red and swollen. Stopped and gtt moved to another site. Site care done. Pharmacist and Hospitalist updated. Redness and swelling was less after warm pack and elevation done.        Plan (Patient Care Plan/focus):     Increase activity.  Bowel movement.  Heart Rhythm stay NSR, continue amiodarone gtt as ordered.  Monitor right wrist, old IV site.      Dawit Lou RN   8/1/2025   10:52 PM

## 2025-08-02 NOTE — PLAN OF CARE
sc  Patient Name: Hung Newell   MRN: [id number]   Date of Admission: 7/25/2025     Procedure: Procedure(s):  CORONARY ARTERY BYPASS GRAFT TIMES TWO, EPI-AORTIC ULTRASOUND,  ENDOSCOPIC SURGICAL PROCUREMENT, ARTERY, RADIAL  TRANSESOPHAGEAL ECHOCARDIOGRAM     Post Op day #: 3     Subjective (Patient focus/Primary Problem for shift): Sleep          Pain Goal 0 Pain Rating 7/10           Pain Medication/ Regime effective to reduce patient pain Yes, ATC Tylenol and PRN Oxycodone 10mg       Objective (Physical assessment):           Rhythm: NSR             Bowel Activity: no          Bowel Medications: none overnight              Incision: healing well          Incentive Spirometry Q 1-2 hour when awake:  yes Volume: 1500          Epicardial Pacing Wires:  NA             Patient Activity:           Up to chair for meals: Yes          Ambulation with RN x2 (Not including CR): No          Shower Daily {YES/NO/NA:178275          Nasal  Nozin BID AM/PM : yes                Chest Tubes              Pleural: not applicable Draining: not applicable               Suction: not applicable              Mediastinal: not applicable Draining: not applicable               Suction: not applicable              Dressing Change Daily:not applicable - should be removed 24 hours after chest tube removal so pt can shower.                      Urinary Catheter: not applicable             Preventative WOC consult (need MD order): not applicable         Assessment (Nursing primary shift focus): No acute issues overnight. Pt remained in NSR overnight.    Plan (Patient Care Plan/focus): Continue cardiac rehab and have a bowel movement         Urszula Shepard RN  8/2/25 5389

## 2025-08-02 NOTE — PROGRESS NOTES
"CVTS Daily Progress Note   POD#4 s/p CABGx2 (left internal mammary artery to the left anterior descending coronary artery/ left radial artery to the obtuse marginal coronary artery)  Attending: Abbie  LOS: 8    SUBJECTIVE/INTERVAL EVENTS:    No acute events overnight. Converted from a-fib to NSR. Normotensive. Patient progressing overall very well. Maintaining oxygen saturations on room air. Ambulating with therapy. Pain well controlled although does endorse some surgical pain. - BM / + flatus. Tolerating limited diet without nausea. UOP adequate. Chest tubes removed yesterday. Patient denies new chest pain, shortness of breath, abdominal pain, calf pain, nausea. Patient has no questions today.     OBJECTIVE:  Temp:  [98.1  F (36.7  C)-99.3  F (37.4  C)] 98.2  F (36.8  C)  Pulse:  [] 96  Resp:  [18-19] 18  BP: ()/(56-77) 105/65  SpO2:  [93 %-96 %] 96 %  Vitals:    07/29/25 0509 07/30/25 0630 07/31/25 0637 08/01/25 0611   Weight: 78.9 kg (173 lb 14.4 oz) 81.3 kg (179 lb 4.8 oz) 80.5 kg (177 lb 8 oz) 79.8 kg (175 lb 14.8 oz)    08/02/25 0545   Weight: 80.1 kg (176 lb 8 oz)       Clinically Significant Risk Factors        # Hypokalemia: Lowest K = 3.3 mmol/L in last 2 days, will replace as needed  # Hyponatremia: Lowest Na = 133 mmol/L in last 2 days, will monitor as appropriate  # Hypochloremia: Lowest Cl = 94 mmol/L in last 2 days, will monitor as appropriate  # Hypocalcemia: Lowest iCa = 4.1 mg/dL in last 2 days, will monitor and replace as appropriate                      # Overweight: Estimated body mass index is 27.64 kg/m  as calculated from the following:    Height as of this encounter: 1.702 m (5' 7\").    Weight as of this encounter: 80.1 kg (176 lb 8 oz).       # History of CABG: noted on surgical history            Current Medications:    Scheduled Meds:  Current Facility-Administered Medications   Medication Dose Route Frequency Provider Last Rate Last Admin    acetaminophen (TYLENOL) tablet " 975 mg  975 mg Oral Q8H [facility] Arcelia Baltazar PA-C   975 mg at 08/02/25 0625    amLODIPine (NORVASC) tablet 2.5 mg  2.5 mg Oral Daily Arcelia Baltazar PA-C   2.5 mg at 08/01/25 1004    aspirin (ASA) chewable tablet 162 mg  162 mg Oral or NG Tube Daily Arcelia Baltazar PA-C   162 mg at 08/01/25 1004    ezetimibe (ZETIA) tablet 10 mg  10 mg Oral At Bedtime Arcelia Baltazar PA-C   10 mg at 08/01/25 2122    furosemide (LASIX) injection 20 mg  20 mg Intravenous TID Arcelia Baltazar PA-C   20 mg at 08/02/25 0626    heparin ANTICOAGULANT injection 5,000 Units  5,000 Units Subcutaneous Q8H Arcelia Baltazar PA-C   5,000 Units at 08/02/25 0336    Lidocaine (LIDOCARE) 4 % Patch 1-2 patch  1-2 patch Transdermal Q24H Arcelia Baltazar PA-C   2 patch at 08/01/25 1331    magnesium oxide (MAG-OX) tablet 400 mg  400 mg Oral Q4H Arcelia Baltazar PA-C   400 mg at 08/02/25 0626    metoprolol tartrate (LOPRESSOR) half-tab 12.5 mg  12.5 mg Oral TID Arcelia Baltazar PA-C   12.5 mg at 08/02/25 0336    metoprolol tartrate (LOPRESSOR) tablet 50 mg  50 mg Oral BID Arcelia Baltazar PA-C   50 mg at 08/01/25 1004    pantoprazole (PROTONIX) EC tablet 40 mg  40 mg Oral QAM AC Arcelia Baltazar PA-C   40 mg at 08/02/25 0626    polyethylene glycol (MIRALAX) Packet 17 g  17 g Oral Daily Arcelia Baltazar PA-C   17 g at 08/01/25 1007    potassium & sodium phosphates (NEUTRA-PHOS) Packet 1 packet  1 packet Oral or Feeding Tube Q4H Arcelia Baltazar PA-C   1 packet at 08/02/25 0626    rosuvastatin (CRESTOR) tablet 40 mg  40 mg Oral Daily Arcelia Baltazar PA-C   40 mg at 08/01/25 1004    senna-docusate (SENOKOT-S/PERICOLACE) 8.6-50 MG per tablet 1 tablet  1 tablet Oral BID Arcelia Baltazar PA-C   1 tablet at 08/01/25 0132     Continuous Infusions:  Current Facility-Administered Medications   Medication Dose Route Frequency Provider Last Rate Last Admin      PRN Meds:.  Current Facility-Administered Medications   Medication Dose Route Frequency Provider Last Rate Last Admin    bisacodyl (DULCOLAX) suppository 10 mg  10 mg Rectal Daily PRN Arcelia Baltazar PA-C        calcium gluconate 1 g in 50 mL in sodium chloride intermittent infusion  1 g Intravenous Once PRN Arcelia Baltazar PA-C   1 g at 08/02/25 0631    calcium gluconate 2 g in  mL intermittent infusion  2 g Intravenous Once PRN Arcelia Baltazar PA-C   2 g at 07/29/25 1319    calcium gluconate 3 g in sodium chloride 0.9 % 100 mL intermittent infusion  3 g Intravenous Once PRN Arcelia Baltazar PA-C        glucose gel 15-30 g  15-30 g Oral Q15 Min PRN Arcelia Baltazar PA-C        Or    dextrose 50 % injection 25-50 mL  25-50 mL Intravenous Q15 Min PRN Arcelia Baltazar PA-C        Or    glucagon injection 1 mg  1 mg Subcutaneous Q15 Min PRN Arcelia Baltazar PA-C        hydrALAZINE (APRESOLINE) injection 10 mg  10 mg Intravenous Q30 Min PRN Arcelia Baltazar PA-C        ketorolac (TORADOL) injection 30 mg  30 mg Intravenous Q6H PRN Arcelia Baltazar PA-C        lactated ringers BOLUS 250 mL  250 mL Intravenous Q15 Min PRN Arcelia Baltazar PA-C   250 mL at 07/29/25 1552    magnesium hydroxide (MILK OF MAGNESIA) suspension 30 mL  30 mL Oral Daily PRN Arcelia Baltazar PA-C        naloxone (NARCAN) injection 0.2 mg  0.2 mg Intravenous Q2 Min PRN Arcelia Baltazar PA-C        Or    naloxone (NARCAN) injection 0.4 mg  0.4 mg Intravenous Q2 Min PRN Arcelia Baltazar PA-C        Or    naloxone (NARCAN) injection 0.2 mg  0.2 mg Intramuscular Q2 Min PRN Arcelia Baltazar PA-C        Or    naloxone (NARCAN) injection 0.4 mg  0.4 mg Intramuscular Q2 Min PRN Arcelia Baltazar PA-C        ondansetron (ZOFRAN ODT) ODT tab 4 mg  4 mg Oral Q6H PRN Arcelia Baltazar PA-C        Or    ondansetron (ZOFRAN) injection 4 mg  4  mg Intravenous Q6H PRN Arcelia Baltazar PA-C        oxyCODONE (ROXICODONE) tablet 5-10 mg  5-10 mg Oral Q4H PRN Arcelia Baltazar PA-C   10 mg at 08/02/25 0412    prochlorperazine (COMPAZINE) injection 10 mg  10 mg Intravenous Q6H PRN Arcelia Baltazar PA-C        Or    prochlorperazine (COMPAZINE) tablet 10 mg  10 mg Oral Q6H PRN Arcelia Baltazar PA-C           Cardiographics:    Telemetry monitoring demonstrates NSR with rates in the 90s per my personal review.    Imaging:  Results for orders placed or performed during the hospital encounter of 07/25/25   XR Chest 2 Views    Impression    IMPRESSION:   Mild blunting of the left costophrenic sulcus on the lateral view may represent a tiny pleural effusion. The chest is otherwise negative.   US Carotid Bilateral    Impression    IMPRESSION:  1.  Mild plaque formation, velocities consistent with less than 50% stenosis in the right internal carotid artery.  2.  Mild plaque formation, velocities consistent with less than 50% stenosis in the left internal carotid artery.  3.  Flow within the vertebral arteries is antegrade.   US Lower Extremity Venous Mapping Bilateral    Impression    IMPRESSION:  1. Bilateral lower extremity venous mapping.   XR Chest Port 1 View    Impression    IMPRESSION: Interval sternotomy. Endotracheal tube is located 2.9 cm above the arturo. Right IJ central catheter in the lower SVC. Mediastinal drains and left chest tube. Questionable tiny left apical pneumothorax with 2 mm pleural separation. Low lung   volumes. Mild bibasilar atelectasis. No definite pleural fluid. Normal heart size. No vascular congestion.   XR Chest Port 1 View    Impression    IMPRESSION:   1.  Left-sided chest tube with likely trace left apical pneumothorax, unchanged.  2.  Right IJ central catheter tip in mid SVC.  3.  CABG. Mediastinal drains.  4.  Clear lungs with small pleural effusions.     Echocardiogram Complete   Result Value Ref Range     LVEF  60-65%        Labs, personally reviewed.  Hemoglobin   Date Value Ref Range Status   08/01/2025 11.1 (L) 13.3 - 17.7 g/dL Final   07/31/2025 11.3 (L) 13.3 - 17.7 g/dL Final   07/30/2025 11.4 (L) 13.3 - 17.7 g/dL Final     WBC Count   Date Value Ref Range Status   08/01/2025 11.7 (H) 4.0 - 11.0 10e3/uL Final   07/31/2025 11.9 (H) 4.0 - 11.0 10e3/uL Final   07/30/2025 10.4 4.0 - 11.0 10e3/uL Final     Platelet Count   Date Value Ref Range Status   08/02/2025 201 150 - 450 10e3/uL Final   08/01/2025 185 150 - 450 10e3/uL Final   07/31/2025 141 (L) 150 - 450 10e3/uL Final     Creatinine   Date Value Ref Range Status   08/02/2025 0.79 0.67 - 1.17 mg/dL Final   08/01/2025 0.94 0.67 - 1.17 mg/dL Final   07/31/2025 0.94 0.67 - 1.17 mg/dL Final     Potassium   Date Value Ref Range Status   08/02/2025 3.3 (L) 3.4 - 5.3 mmol/L Final   08/01/2025 3.9 3.4 - 5.3 mmol/L Final   07/31/2025 3.7 3.4 - 5.3 mmol/L Final     Potassium POCT   Date Value Ref Range Status   07/29/2025 3.5 3.4 - 5.3 mmol/L Final   07/29/2025 4.3 3.4 - 5.3 mmol/L Final   07/29/2025 5.2 3.4 - 5.3 mmol/L Final     Magnesium   Date Value Ref Range Status   08/02/2025 1.9 1.7 - 2.3 mg/dL Final   08/01/2025 2.0 1.7 - 2.3 mg/dL Final   07/31/2025 1.9 1.7 - 2.3 mg/dL Final          I/O:  I/O last 3 completed shifts:  In: 1775 [P.O.:1140; I.V.:635]  Out: 2200 [Urine:2200]       Physical Exam:    General: Patient seen up in chair. NAD. Conversant. Pleasant  CV: RRR on monitor. 2+ peripheral pulses in all extremities. Mild edema. Incision C/D/I.  Pulm: Non-labored effort on room air  Abd: Soft, NT, ND  Ext: Mild pedal edema, SCDs in place, warm, distal pulses intact  Neuro: CNs grossly intact      ASSESSMENT/PLAN:    Hung Newell is a 55 year old male with a history of CAD who is s/p CABG.    Principal Problem:    CAD (coronary artery disease)  Active Problems:    Status post coronary angiogram    Hypercholesterolemia    Pre-diabetes    CAD in native  artery        NEURO:   - Scheduled Tylenol/lidocaine patches and PRN Tylenol/oxycodone/dilaudid/Toradol for pain    CV:   - Pre-op EF 60-65%  - A-fib AM of 8/1  - Amiodarone 200mg PO BID  - Amlodipine 2.5mg daily (radial artery graft protection)  - Metoprolol 37.5mg two times a day  increased to 50mg BID  - ASA 162mg daily  - Rosuvastatin 40mg daily  - Zetia 10mg daily  - Chest tubes and TPW removed 8/1    PULM:   - Extubated on POD#0  - Maintaining oxygen saturations on room air  - Encourage pulmonary toilet    FEN/GI:  - Continue electrolyte replacement protocol  - Cardiac; ADAT  - Bowel regimen    RENAL:  - Adequate UOP/hr. Continue to monitor closely.  - Cr 0.79  - Diuresis with 20mg IV Lasix TID    HEME:  - Acute blood loss anemia post-op.   - No bleeding concerns. Hep SQ, ASA    ID:  - Nubia op ppx complete, afebrile. No concerns for infection    ENDO:   - Euglycemic; stopped BG checks and SSI    PPx:   - DVT: SCDs, SQ heparin TID, ambulation   - GI: Protonix 40mg IV/PO daily    DISPO:   - General telemetry status since POD#1  - Medically Ready for Discharge: Anticipated Tomorrow pending ROBF, rhythm stability         Patient discussed with Dr. Aguiar.        _______  Arcelia Baltazar PA-C  [facility] Cardiothoracic Surgery  Communication via Vocera Secure Messaging

## 2025-08-03 VITALS
BODY MASS INDEX: 27.45 KG/M2 | TEMPERATURE: 99.4 F | SYSTOLIC BLOOD PRESSURE: 118 MMHG | WEIGHT: 174.9 LBS | DIASTOLIC BLOOD PRESSURE: 70 MMHG | HEART RATE: 95 BPM | HEIGHT: 67 IN | OXYGEN SATURATION: 92 % | RESPIRATION RATE: 18 BRPM

## 2025-08-03 LAB
ANION GAP SERPL CALCULATED.3IONS-SCNC: 5 MMOL/L (ref 7–15)
BUN SERPL-MCNC: 8.5 MG/DL (ref 6–20)
CA-I BLD-MCNC: 4.4 MG/DL (ref 4.4–5.2)
CALCIUM SERPL-MCNC: 8.8 MG/DL (ref 8.8–10.4)
CHLORIDE SERPL-SCNC: 94 MMOL/L (ref 98–107)
CREAT SERPL-MCNC: 0.83 MG/DL (ref 0.67–1.17)
EGFRCR SERPLBLD CKD-EPI 2021: >90 ML/MIN/1.73M2
GLUCOSE SERPL-MCNC: 107 MG/DL (ref 70–99)
HCO3 SERPL-SCNC: 33 MMOL/L (ref 22–29)
MAGNESIUM SERPL-MCNC: 1.8 MG/DL (ref 1.7–2.3)
PHOSPHATE SERPL-MCNC: 3.4 MG/DL (ref 2.5–4.5)
POTASSIUM SERPL-SCNC: 3.7 MMOL/L (ref 3.4–5.3)
SODIUM SERPL-SCNC: 132 MMOL/L (ref 135–145)

## 2025-08-03 PROCEDURE — 36415 COLL VENOUS BLD VENIPUNCTURE: CPT | Performed by: PHYSICIAN ASSISTANT

## 2025-08-03 PROCEDURE — 94660 CPAP INITIATION&MGMT: CPT

## 2025-08-03 PROCEDURE — 250N000013 HC RX MED GY IP 250 OP 250 PS 637: Performed by: PHYSICIAN ASSISTANT

## 2025-08-03 PROCEDURE — 80048 BASIC METABOLIC PNL TOTAL CA: CPT | Performed by: PHYSICIAN ASSISTANT

## 2025-08-03 PROCEDURE — 82330 ASSAY OF CALCIUM: CPT | Performed by: PHYSICIAN ASSISTANT

## 2025-08-03 PROCEDURE — 94799 UNLISTED PULMONARY SVC/PX: CPT

## 2025-08-03 PROCEDURE — 250N000011 HC RX IP 250 OP 636: Performed by: PHYSICIAN ASSISTANT

## 2025-08-03 PROCEDURE — 83735 ASSAY OF MAGNESIUM: CPT | Performed by: PHYSICIAN ASSISTANT

## 2025-08-03 PROCEDURE — 99232 SBSQ HOSP IP/OBS MODERATE 35: CPT | Performed by: INTERNAL MEDICINE

## 2025-08-03 PROCEDURE — 84100 ASSAY OF PHOSPHORUS: CPT | Performed by: PHYSICIAN ASSISTANT

## 2025-08-03 RX ORDER — CLOPIDOGREL BISULFATE 75 MG/1
75 TABLET ORAL DAILY
Qty: 90 TABLET | Refills: 3 | Status: SHIPPED | OUTPATIENT
Start: 2025-08-03

## 2025-08-03 RX ORDER — POTASSIUM CHLORIDE 1500 MG/1
20 TABLET, EXTENDED RELEASE ORAL ONCE
Status: COMPLETED | OUTPATIENT
Start: 2025-08-03 | End: 2025-08-03

## 2025-08-03 RX ORDER — AMLODIPINE BESYLATE 2.5 MG/1
2.5 TABLET ORAL DAILY
Qty: 30 TABLET | Refills: 2 | Status: CANCELLED | OUTPATIENT
Start: 2025-08-03

## 2025-08-03 RX ORDER — OXYCODONE HYDROCHLORIDE 5 MG/1
5 TABLET ORAL EVERY 4 HOURS PRN
Qty: 20 TABLET | Refills: 0 | Status: CANCELLED | OUTPATIENT
Start: 2025-08-03

## 2025-08-03 RX ORDER — MAGNESIUM OXIDE 400 MG/1
400 TABLET ORAL EVERY 4 HOURS
Status: COMPLETED | OUTPATIENT
Start: 2025-08-03 | End: 2025-08-03

## 2025-08-03 RX ORDER — POTASSIUM CHLORIDE 1500 MG/1
20 TABLET, EXTENDED RELEASE ORAL DAILY
Qty: 3 TABLET | Refills: 0 | Status: SHIPPED | OUTPATIENT
Start: 2025-08-03

## 2025-08-03 RX ORDER — ASPIRIN 81 MG/1
81 TABLET, CHEWABLE ORAL DAILY
Status: DISCONTINUED | OUTPATIENT
Start: 2025-08-03 | End: 2025-08-03 | Stop reason: HOSPADM

## 2025-08-03 RX ORDER — METOPROLOL TARTRATE 37.5 MG/1
50 TABLET ORAL 2 TIMES DAILY
Qty: 180 TABLET | Refills: 0 | Status: SHIPPED | OUTPATIENT
Start: 2025-08-03

## 2025-08-03 RX ORDER — AMIODARONE HYDROCHLORIDE 200 MG/1
200 TABLET ORAL 2 TIMES DAILY
Qty: 60 TABLET | Refills: 0 | Status: SHIPPED | OUTPATIENT
Start: 2025-08-03

## 2025-08-03 RX ORDER — OXYCODONE HYDROCHLORIDE 5 MG/1
5 TABLET ORAL EVERY 8 HOURS PRN
Qty: 15 TABLET | Refills: 0 | Status: SHIPPED | OUTPATIENT
Start: 2025-08-03

## 2025-08-03 RX ORDER — AMLODIPINE BESYLATE 2.5 MG/1
2.5 TABLET ORAL DAILY
Qty: 90 TABLET | Refills: 3 | Status: SHIPPED | OUTPATIENT
Start: 2025-08-03

## 2025-08-03 RX ORDER — METOPROLOL TARTRATE 50 MG
50 TABLET ORAL 2 TIMES DAILY
Qty: 60 TABLET | Refills: 2 | Status: CANCELLED | OUTPATIENT
Start: 2025-08-03

## 2025-08-03 RX ORDER — AMIODARONE HYDROCHLORIDE 200 MG/1
200 TABLET ORAL 2 TIMES DAILY
Qty: 30 TABLET | Refills: 2 | Status: CANCELLED | OUTPATIENT
Start: 2025-08-03

## 2025-08-03 RX ORDER — ACETAMINOPHEN 325 MG/1
975 TABLET ORAL EVERY 8 HOURS PRN
Qty: 60 TABLET | Refills: 0 | Status: SHIPPED | OUTPATIENT
Start: 2025-08-03

## 2025-08-03 RX ORDER — CLOPIDOGREL BISULFATE 75 MG/1
75 TABLET ORAL DAILY
Status: DISCONTINUED | OUTPATIENT
Start: 2025-08-03 | End: 2025-08-03 | Stop reason: HOSPADM

## 2025-08-03 RX ORDER — FUROSEMIDE 20 MG/1
20 TABLET ORAL
Qty: 6 TABLET | Refills: 0 | Status: SHIPPED | OUTPATIENT
Start: 2025-08-03

## 2025-08-03 RX ORDER — AMOXICILLIN 250 MG
1 CAPSULE ORAL 2 TIMES DAILY PRN
Qty: 30 TABLET | Refills: 0 | Status: SHIPPED | OUTPATIENT
Start: 2025-08-03

## 2025-08-03 RX ADMIN — MAGNESIUM OXIDE TAB 400 MG (241.3 MG ELEMENTAL MG) 400 MG: 400 (241.3 MG) TAB at 06:31

## 2025-08-03 RX ADMIN — PANTOPRAZOLE SODIUM 40 MG: 40 TABLET, DELAYED RELEASE ORAL at 06:31

## 2025-08-03 RX ADMIN — CALCIUM GLUCONATE 1 G: 20 INJECTION, SOLUTION INTRAVENOUS at 06:31

## 2025-08-03 RX ADMIN — FUROSEMIDE 20 MG: 10 INJECTION, SOLUTION INTRAMUSCULAR; INTRAVENOUS at 06:31

## 2025-08-03 RX ADMIN — ACETAMINOPHEN 975 MG: 325 TABLET, FILM COATED ORAL at 06:31

## 2025-08-03 RX ADMIN — POTASSIUM CHLORIDE 20 MEQ: 1500 TABLET, EXTENDED RELEASE ORAL at 06:31

## 2025-08-03 RX ADMIN — HEPARIN SODIUM 5000 UNITS: 10000 INJECTION, SOLUTION INTRAVENOUS; SUBCUTANEOUS at 03:47

## 2025-08-03 RX ADMIN — ASPIRIN 81 MG CHEWABLE TABLET 81 MG: 81 TABLET CHEWABLE at 09:08

## 2025-08-03 RX ADMIN — AMIODARONE HYDROCHLORIDE 200 MG: 200 TABLET ORAL at 09:07

## 2025-08-03 RX ADMIN — MAGNESIUM OXIDE TAB 400 MG (241.3 MG ELEMENTAL MG) 400 MG: 400 (241.3 MG) TAB at 09:50

## 2025-08-03 RX ADMIN — OXYCODONE HYDROCHLORIDE 5 MG: 5 TABLET ORAL at 09:10

## 2025-08-03 RX ADMIN — Medication 12.5 MG: at 03:47

## 2025-08-03 RX ADMIN — AMLODIPINE BESYLATE 2.5 MG: 2.5 TABLET ORAL at 09:09

## 2025-08-03 RX ADMIN — ROSUVASTATIN 40 MG: 40 TABLET, FILM COATED ORAL at 09:08

## 2025-08-03 RX ADMIN — METOPROLOL TARTRATE 50 MG: 25 TABLET, FILM COATED ORAL at 09:09

## 2025-08-03 RX ADMIN — CLOPIDOGREL BISULFATE 75 MG: 75 TABLET, FILM COATED ORAL at 09:50

## 2025-08-03 RX ADMIN — OXYCODONE HYDROCHLORIDE 10 MG: 5 TABLET ORAL at 04:24

## 2025-08-03 ASSESSMENT — ACTIVITIES OF DAILY LIVING (ADL)
ADLS_ACUITY_SCORE: 33

## 2025-08-03 NOTE — PLAN OF CARE
Problem: Adult Inpatient Plan of Care  Goal: Plan of Care Review  Description: The Plan of Care Review/Shift note should be completed every shift.  The Outcome Evaluation is a brief statement about your assessment that the patient is improving, declining, or no change.  This information will be displayed automatically on your shift  note.  Outcome: Progressing     Problem: Cardiac Catheterization (Diagnostic/Interventional)  Goal: Anesthesia/Sedation Recovery  Intervention: Optimize Anesthesia Recovery  Recent Flowsheet Documentation  Taken 8/2/2025 2130 by Dawit Lou RN  Safety Promotion/Fall Prevention:   activity supervised   nonskid shoes/slippers when out of bed   patient and family education   safety round/check completed  Reorientation Measures:   calendar in view   clock in view  Taken 8/2/2025 1700 by Dawit Lou RN  Safety Promotion/Fall Prevention:   activity supervised   nonskid shoes/slippers when out of bed   patient and family education   safety round/check completed  Reorientation Measures:   calendar in view   clock in view  Level Incentive Spirometer (mL): 2000  Number of Repetitions (IS): 10  Goal: Optimal Pain Control and Function  Intervention: Prevent or Manage Pain  Recent Flowsheet Documentation  Taken 8/2/2025 2132 by Dawit Lou RN  Pain Management Interventions: medication (see MAR)  Taken 8/2/2025 1901 by Dawit Lou RN  Pain Management Interventions: medication (see MAR)  Goal: Absence of Vascular Access Complication  Outcome: Progressing  Intervention: Prevent and Manage Access Complications  Recent Flowsheet Documentation  Taken 8/2/2025 2130 by Dawit Lou RN  Activity Management: activity adjusted per tolerance  Head of Bed (HOB) Positioning: HOB at 30 degrees  Taken 8/2/2025 1700 by Dawit Lou RN  Activity Management: activity adjusted per tolerance  Head of Bed (HOB) Positioning: HOB at 30 degrees     Problem: Chest Pain  Goal: Resolution of Chest Pain  Symptoms  Outcome: Progressing     Problem: Cardiovascular Surgery  Goal: Improved Activity Tolerance  Outcome: Progressing  Goal: Optimal Coping with Heart Surgery  Intervention: Support Psychosocial Response to Surgery  Recent Flowsheet Documentation  Taken 8/2/2025 2130 by Dawit Lou RN  Supportive Measures:   active listening utilized   verbalization of feelings encouraged  Taken 8/2/2025 1700 by Dawit Lou RN  Supportive Measures:   active listening utilized   verbalization of feelings encouraged  Goal: Absence of Bleeding  Outcome: Progressing  Goal: Optimal Cerebral Tissue Perfusion  Intervention: Protect and Optimize Cerebral Perfusion  Recent Flowsheet Documentation  Taken 8/2/2025 2130 by Dawit Lou RN  Head of Bed (HOB) Positioning: HOB at 30 degrees  Taken 8/2/2025 1700 by Dawit Lou RN  Head of Bed (HOB) Positioning: HOB at 30 degrees  Goal: Absence of Infection Signs and Symptoms  Outcome: Progressing  Intervention: Prevent or Manage Infection  Recent Flowsheet Documentation  Taken 8/2/2025 2130 by Dawit Lou RN  Infection Prevention:   environmental surveillance performed   hand hygiene promoted   rest/sleep promoted  Taken 8/2/2025 1700 by Dawit Lou RN  Infection Prevention:   environmental surveillance performed   hand hygiene promoted   rest/sleep promoted  Goal: Anesthesia/Sedation Recovery  Intervention: Optimize Anesthesia Recovery  Recent Flowsheet Documentation  Taken 8/2/2025 2130 by Dawit Lou RN  Safety Promotion/Fall Prevention:   activity supervised   nonskid shoes/slippers when out of bed   patient and family education   safety round/check completed  Reorientation Measures:   calendar in view   clock in view  Taken 8/2/2025 1700 by Dawit Lou RN  Safety Promotion/Fall Prevention:   activity supervised   nonskid shoes/slippers when out of bed   patient and family education   safety round/check completed  Reorientation Measures:   calendar in view   clock in  view  Level Incentive Spirometer (mL): 2000  Number of Repetitions (IS): 10  Goal: Acceptable Pain Control  Intervention: Prevent or Manage Pain  Recent Flowsheet Documentation  Taken 8/2/2025 2132 by Dawit Lou RN  Pain Management Interventions: medication (see MAR)  Taken 8/2/2025 1901 by Dawit Lou RN  Pain Management Interventions: medication (see MAR)  Goal: Effective Oxygenation and Ventilation  Intervention: Promote Airway Secretion Clearance  Recent Flowsheet Documentation  Taken 8/2/2025 1700 by Dawit Lou RN  Level Incentive Spirometer (mL): 2000  Number of Repetitions (IS): 10   Goal Outcome Evaluation:                    ..sc  Patient Name: Hung Newell   MRN: [id number]   Date of Admission: 7/25/2025    Procedure: Procedure(s):  CORONARY ARTERY BYPASS GRAFT TIMES TWO, EPI-AORTIC ULTRASOUND,  ENDOSCOPIC SURGICAL PROCUREMENT, ARTERY, RADIAL  TRANSESOPHAGEAL ECHOCARDIOGRAM    Post Op day #:4    Subjective (Patient focus/Primary Problem for shift):  mid sternum incision pain          Pain Goal0/10 Pain Rating 4-7/10           Pain Medication/ Regime effective to reduce patient pain yes    Objective (Physical assessment):           Rhythm: normal sinus rhythm            Bowel Activity: yes if Yes indicate when: 8/2           Bowel Medications: yes            Incision: healing well          Incentive Spirometry Q 1-2 hour when awake:  yes Volume: 2000          Epicardial Pacing Wires:  not applicable            Patient Activity:           Up to chair for meals: yes          Ambulation with RN x2 (Not including CR): yes           Shower Daily {YES/NO/NA:830843          Nasal  Nozin BID AM/PM : yes              Chest Tubes   Pleural: not applicable Draining: not applicable               Suction: not applicable              Mediastinal: not applicable Draining: not applicable               Suction: not applicable   Dressing Change Daily:not applicable If No, why? Open to air                      Urinary Catheter: not applicable           Preventative WOC consult (need MD order): not applicable       Assessment (Nursing primary shift focus):     Alert. Oriented x4.    Prn Oxycodone 5 mg po given this afternoon for mid sternal incision pain with improvement. On scheduled Acetaminophen 975 mg.    Sinus rhythm, Sinus tach low 100s.    Had bowel movement today.    Up chair dinner. Walked in the hallway couple of times.    Independent. Standby assist as needed for safety. Sternal precaution. Falls precaution. Interventions in place.        Plan (Patient Care Plan/focus):     HR and BP WNL.  Go home soon.    Dawit Lou RN   8/2/2025   10:08 PM

## 2025-08-03 NOTE — PLAN OF CARE
sc  Patient Name: Hnug Newell   MRN: [id number]   Date of Admission: 7/25/2025     Procedure: Procedure(s):  CORONARY ARTERY BYPASS GRAFT TIMES TWO, EPI-AORTIC ULTRASOUND,  ENDOSCOPIC SURGICAL PROCUREMENT, ARTERY, RADIAL  TRANSESOPHAGEAL ECHOCARDIOGRAM     Post Op day #: 4     Subjective (Patient focus/Primary Problem for shift): Sleep          Pain Goal 0 Pain Rating 7/10           Pain Medication/ Regime effective to reduce patient pain Yes, ATC Tylenol and PRN Oxycodone 10mg        Objective (Physical assessment):           Rhythm: NSR             Bowel Activity: no          Bowel Medications: none overnight              Incision: healing well          Incentive Spirometry Q 1-2 hour when awake:  yes Volume: 1500          Epicardial Pacing Wires:  NA             Patient Activity:           Up to chair for meals: Yes          Ambulation with RN x2 (Not including CR): No          Shower Daily {YES/NO/NA:229114          Nasal  Nozin BID AM/PM : yes                Chest Tubes              Pleural: not applicable Draining: not applicable               Suction: not applicable              Mediastinal: not applicable Draining: not applicable               Suction: not applicable              Dressing Change Daily:not applicable                    Urinary Catheter: not applicable             Preventative WOC consult (need MD order): not applicable         Assessment (Nursing primary shift focus): No acute issues overnight. Pt is up and ambulating to the bathroom independently.      Plan (Patient Care Plan/focus): Continue cardiac rehab.         Urszula Shepard RN  8/3/25 [id number]

## 2025-08-03 NOTE — TREATMENT PLAN
RCAT Treatment Plan    Patient Score: 8  Patient Acuity: 4    Clinical Indication for Therapy: atelectasis CABx2, Coronary angiogram left heart catheterization 7/25/2025    Therapy Ordered: FV / Volume expansion  BID    Assessment Summary: Patient has great effort with FV and versaPAP. RT will continue to follow and reevaluate with in 72 hours per rcat protocol .      Patricio Enriquez, RT  8/2/2025

## 2025-08-03 NOTE — PLAN OF CARE
Cardiac Rehab Discharge Summary    Reason for therapy discharge:    Discharged to home with outpatient therapy.    Progress towards therapy goal(s). See goals on Care Plan in Epic electronic health record for goal details.  Goals partially met.  Barriers to achieving goals:   discharge from facility.    Therapy recommendation(s):    Continued therapy is recommended.  Rationale/Recommendations:  Outpt. Cardiac Rehab.

## 2025-08-03 NOTE — PROGRESS NOTES
[facility]    Medicine Progress Note - Hospitalist Service    Date of Admission:  7/25/2025    Assessment & Plan   Hung Newell is a 55 year old male with PMHx significant for angina, prediabetes and hyperlipidemia who was admitted to [facility] on 7/25/2025 for unstable angina s/p CABG times 27/29/2025.  Postoperatively, he developed new onset atrial fibrillation with RVR 8/1/2025 requiring amiodarone drip (discontinued 8/2/2025). Pt medically stable from hospitalist perspective so we will sign off    CAD s/p CABG  Unstable angina  History of elevated calcium score 44 in 2022.  - exertional chest pain, s/p coronary angiogram 7/25 showed severe left main disease.  - Cardiothoracic surgery consult: CABG x2 7/29 with Dr. Leiva  - On aspirin, Crestor, Zetia  - Postoperative management per CT surgery  - Cardiac rehab, PT/OT consulted    New onset atrial fibrillation with RVR - resolved  Developed overnight on 7/31 - 8/1 with RVRs to 190s  - [id number]: 1  Plan  - Metoprolol 50mg BID + amio 200mg BID    HLD  -On rosuvastatin 40 mg, Zetia 10 mg    Prediabetes - A1c 5.7  - D/W lifestyle modifications.    - RD consulted    Diet: Combination Diet Regular Diet; 2 gm Sodium Diet; Low Saturated Fat Sodium <2400mg Diet  Snacks/Supplements Adult: Ensure Plus High Protein; With Meals    DVT Prophylaxis: Pneumatic Compression Devices  Quinn Catheter: Not present  Lines: None     Cardiac Monitoring: ACTIVE order. Indication: Open heart surgery (7 days)  Code Status: Full Code      Disposition Plan     Medically Ready for Discharge:  pending primary    Emmett Lobo DO  Hospitalist Service  [facility]  Securely message with [employer] (more info)  Text page via [employer] Paging/Directory   ______________________________________________________________________    Interval History   No overnight events.    On evaluation, is doing well. No fevers, chills, CP, N/V. Able to eat  breakfast without issues.    Physical Exam   Vital Signs: Temp: 99.4  F (37.4  C) Temp src: Oral BP: 118/70 Pulse: 95   Resp: 18 SpO2: 92 % O2 Device: None (Room air) Oxygen Delivery: 1 LPM  Weight: 174 lbs 14.4 oz    General Appearance: Awake, alert, in no acute distress  Respiratory: CTAB, no wheeze  Cardiovascular: RRR, no murmur noted  GI: soft, nontender, non distended, normal bowel sounds  Skin: no jaundice, no rash, bruising on left forearm      Medical Decision Making       30 MINUTES SPENT BY ME on the date of service doing chart review, history, exam, documentation & further activities per the note.      Data     I have personally reviewed the following data over the past 24 hrs:    N/A  \   N/A   / N/A     132 (L) 94 (L) 8.5 /  107 (H)   3.7 33 (H) 0.83 \       Imaging results reviewed over the past 24 hrs:   No results found for this or any previous visit (from the past 24 hours).

## 2025-08-04 ENCOUNTER — TELEPHONE (OUTPATIENT)
Dept: CARDIOLOGY | Facility: CLINIC | Age: 55
End: 2025-08-04
Payer: COMMERCIAL

## 2025-08-04 LAB
ATRIAL RATE - MUSE: 99 BPM
DIASTOLIC BLOOD PRESSURE - MUSE: NORMAL MMHG
INTERPRETATION ECG - MUSE: NORMAL
P AXIS - MUSE: 35 DEGREES
PR INTERVAL - MUSE: 146 MS
QRS DURATION - MUSE: 82 MS
QT - MUSE: 398 MS
QTC - MUSE: 510 MS
R AXIS - MUSE: 40 DEGREES
SYSTOLIC BLOOD PRESSURE - MUSE: NORMAL MMHG
T AXIS - MUSE: 16 DEGREES
VENTRICULAR RATE- MUSE: 99 BPM

## 2025-08-26 ENCOUNTER — OFFICE VISIT (OUTPATIENT)
Dept: CARDIOLOGY | Facility: CLINIC | Age: 55
End: 2025-08-26
Payer: COMMERCIAL

## 2025-08-26 VITALS
WEIGHT: 167 LBS | BODY MASS INDEX: 26.16 KG/M2 | RESPIRATION RATE: 16 BRPM | DIASTOLIC BLOOD PRESSURE: 75 MMHG | HEART RATE: 55 BPM | SYSTOLIC BLOOD PRESSURE: 114 MMHG

## 2025-08-26 DIAGNOSIS — Z95.1 S/P CABG (CORONARY ARTERY BYPASS GRAFT): Primary | ICD-10-CM

## 2025-08-26 PROCEDURE — 3074F SYST BP LT 130 MM HG: CPT

## 2025-08-26 PROCEDURE — 3078F DIAST BP <80 MM HG: CPT

## 2025-08-26 PROCEDURE — 99024 POSTOP FOLLOW-UP VISIT: CPT

## (undated) DEVICE — SU STRATAFIX PDS PLUS 0 CT 45CM SXPP1A406

## (undated) DEVICE — CATH ANGIO JUDKINS R4 6FRX100CM INFINITI 534621T

## (undated) DEVICE — SU STRATAFIX PDS PLUS 2-0 SPIRAL CT-1 30CM SXPP1B410

## (undated) DEVICE — CONNECTOR Y 1/2 X 3/8 X 3/8 STRL C440

## (undated) DEVICE — CLIP HORIZON MULTI MED BLUE 002204

## (undated) DEVICE — SU STERNAL WIRE SINGLE #6 046-032

## (undated) DEVICE — BANDAGE ESMARK 4 X 3 YARDS STL 23578-143

## (undated) DEVICE — DRSG DRAIN 4X4" 7086

## (undated) DEVICE — TUBING INSUFFLATION W/FILTER 28-0207

## (undated) DEVICE — GLOVE UNDER INDICATOR PI SZ 6.5 LF 41665

## (undated) DEVICE — GOWN IMPERVIOUS BREATHABLE SMART XLG 89045

## (undated) DEVICE — SLEEVE TR BAND RADIAL COMPRESSION DEVICE 24CM TRB24-REG

## (undated) DEVICE — CLIP HORIZON MULTI SM YELLOW 001204

## (undated) DEVICE — TUBING SMOKE EVAC PNEUMOCLEAR HIGH FLOW 0620050250

## (undated) DEVICE — HEMOSTASIS BONE OSTENE 2.5G SYNTHETIC 1503832

## (undated) DEVICE — SUTURE STRATAFIX PDS 3-0 SH

## (undated) DEVICE — Device

## (undated) DEVICE — DRSG KERLIX 4 1/2"X4YDS ROLL 6715

## (undated) DEVICE — SU PLEDGET SOFT TFE 3/8"X3/26"X1/16" PCP40

## (undated) DEVICE — SHTH INTRO 0.021IN ID 6FR DIA

## (undated) DEVICE — LIGACLIP LARGE AESCULAP O4120-1

## (undated) DEVICE — GLOVE BIOGEL PI ULTRATOUCH G SZ 6.5 42165

## (undated) DEVICE — RX VISTASEAL FIBRIN SEALANT W/THROMBIN 10ML VST10

## (undated) DEVICE — RX SURGIFLO HEMOSTATIC MATRIX W/THROMBIN 8ML 2994

## (undated) DEVICE — SU ETHIBOND 0 CT-1 CR 8X18" CX21D

## (undated) DEVICE — CABLE MYO/LEAD PACING WHITE DISP 019-530

## (undated) DEVICE — SU ETHIBOND 3-0 BBDA 36" X588H

## (undated) DEVICE — LEAD PACER MYOCARDIAL BIPOLAR TEMPORARY 53CM 6495F

## (undated) DEVICE — MANIFOLD KIT ANGIO AUTOMATED 014613

## (undated) DEVICE — SYR ANGIOGRAPHY MULTIUSE KIT ACIST 014612

## (undated) DEVICE — ELECTRODE ADULT PACING MULTI P-211-M1

## (undated) DEVICE — CATH SUCTION 14FR W/O CTRL DYND41962

## (undated) DEVICE — KIT HAND CONTROL ACIST 014644 AR-P54

## (undated) DEVICE — SU DERMABOND ADVANCED .7ML DNX12

## (undated) DEVICE — SU MYOSTERNAL WIRE  046-267

## (undated) DEVICE — SU VICRYL+ 3-0 27IN SH UND VCP416H

## (undated) DEVICE — KIT ENDO VASOVIEW HEMOPRO 2 VH-4000

## (undated) DEVICE — CABLE MYO/LEAD PACING BLUE DISP 019-535

## (undated) DEVICE — SU PROLENE 7-0 BV175-6 24" 2 ARM MONOFILAMENT M8735

## (undated) DEVICE — GLOVE GAMMEX NEOPRENE ULTRA SZ 7 LF 8514

## (undated) DEVICE — CLIP APPLIER 11" MED LIGACLIP MCM20

## (undated) DEVICE — CUSTOM PACK CV ST JOES SCV5BCVHEA

## (undated) DEVICE — SUCTION DRY CHEST DRAIN OASIS 3600-100

## (undated) DEVICE — CLIP SPRING FOGARTY SOFTJAW CSOFT6

## (undated) DEVICE — PACK MAJOR BASIN 673

## (undated) DEVICE — CONNECTOR Y 3/8 X 1/4 X 1/4 STRL C430S

## (undated) DEVICE — ESU GROUND PAD ADULT REM W/15' CORD E7507DB

## (undated) DEVICE — CATH ANGIO JUDKINS JL3.5 6FRX100CM INFINITI 534618T

## (undated) DEVICE — ESU ELEC BLADE 2.75" COATED/INSULATED E1455

## (undated) DEVICE — SU STRATAFIX MONOCRYL 4-0 SPIRAL PS-2 SXMP1B118

## (undated) DEVICE — PREP CHLORAPREP 26ML TINTED HI-LITE ORANGE 930815

## (undated) DEVICE — SU VICRYL 0 CTX 36" J370H

## (undated) DEVICE — SU SILK 3-0 SH 30" K832H

## (undated) DEVICE — SOLUTION IRRIGATION 0.9% NACL 1000ML BOTTLE R5200-01

## (undated) DEVICE — KIT TURNOVER FAIRVIEW SOUTHDALE FULL SP3889

## (undated) DEVICE — SUCTION CANISTER MEDIVAC LINER 3000ML W/LID 65651-530

## (undated) DEVICE — GEL ULTRASOUND AQUASONIC 20GM 01-01

## (undated) DEVICE — CATH THORACIC STRAIGHT CLOTSTOP 32FR

## (undated) DEVICE — GOWN IMPERVIOUS BREATHABLE SMART LG 89015

## (undated) DEVICE — SUTURE MONOCRYL+ 4-0 PS-2 27IN MCP426H

## (undated) DEVICE — BLANKET BAIR ADLT PLYMR 60X36IN 63000

## (undated) DEVICE — CUSTOM PACK CAB SCV5BCBHEA

## (undated) DEVICE — CUSTOM PACK CORONARY SAN5BCRHEA

## (undated) DEVICE — PITCHER STERILE 1000ML  SSK9004A

## (undated) DEVICE — GLOVE LINER COTTON MED 32-2076

## (undated) DEVICE — GLOVE UNDER INDICATOR PI SZ 7.0 LF 41670

## (undated) DEVICE — DEVICE TISSUE STABILIZATION OCTOBASE 28707

## (undated) DEVICE — DRAPE NEW SLUSH/WARMER HUSHSLUSH 2.0 ESD340 ESD340

## (undated) DEVICE — SURGICEL POWDER ABSORBABLE HEMOSTAT 3GM 3013SP

## (undated) RX ORDER — ONDANSETRON 2 MG/ML
INJECTION INTRAMUSCULAR; INTRAVENOUS
Status: DISPENSED
Start: 2025-07-29

## (undated) RX ORDER — GLYCOPYRROLATE 0.2 MG/ML
INJECTION, SOLUTION INTRAMUSCULAR; INTRAVENOUS
Status: DISPENSED
Start: 2025-07-29

## (undated) RX ORDER — CEFAZOLIN SODIUM 1 G/3ML
INJECTION, POWDER, FOR SOLUTION INTRAMUSCULAR; INTRAVENOUS
Status: DISPENSED
Start: 2025-07-29

## (undated) RX ORDER — PROPOFOL 10 MG/ML
INJECTION, EMULSION INTRAVENOUS
Status: DISPENSED
Start: 2025-07-29

## (undated) RX ORDER — FENTANYL CITRATE 50 UG/ML
INJECTION, SOLUTION INTRAMUSCULAR; INTRAVENOUS
Status: DISPENSED
Start: 2025-07-25

## (undated) RX ORDER — VASOPRESSIN 20 [USP'U]/ML
INJECTION, SOLUTION INTRAVENOUS
Status: DISPENSED
Start: 2025-07-29

## (undated) RX ORDER — DIAZEPAM 10 MG/1
TABLET ORAL
Status: DISPENSED
Start: 2025-07-25

## (undated) RX ORDER — VANCOMYCIN HYDROCHLORIDE 1 G/20ML
INJECTION, POWDER, LYOPHILIZED, FOR SOLUTION INTRAVENOUS
Status: DISPENSED
Start: 2025-07-29

## (undated) RX ORDER — LIDOCAINE HYDROCHLORIDE 10 MG/ML
INJECTION, SOLUTION EPIDURAL; INFILTRATION; INTRACAUDAL; PERINEURAL
Status: DISPENSED
Start: 2025-07-29

## (undated) RX ORDER — FENTANYL CITRATE 50 UG/ML
INJECTION, SOLUTION INTRAMUSCULAR; INTRAVENOUS
Status: DISPENSED
Start: 2025-07-29